# Patient Record
Sex: FEMALE | Race: WHITE | NOT HISPANIC OR LATINO
[De-identification: names, ages, dates, MRNs, and addresses within clinical notes are randomized per-mention and may not be internally consistent; named-entity substitution may affect disease eponyms.]

---

## 2017-01-13 ENCOUNTER — MEDICATION RENEWAL (OUTPATIENT)
Age: 67
End: 2017-01-13

## 2017-02-13 ENCOUNTER — MEDICATION RENEWAL (OUTPATIENT)
Age: 67
End: 2017-02-13

## 2017-04-27 ENCOUNTER — OTHER (OUTPATIENT)
Age: 67
End: 2017-04-27

## 2017-05-03 ENCOUNTER — APPOINTMENT (OUTPATIENT)
Dept: FAMILY MEDICINE | Facility: CLINIC | Age: 67
End: 2017-05-03

## 2017-05-03 VITALS
DIASTOLIC BLOOD PRESSURE: 60 MMHG | BODY MASS INDEX: 23.7 KG/M2 | HEIGHT: 67 IN | RESPIRATION RATE: 16 BRPM | OXYGEN SATURATION: 98 % | WEIGHT: 151 LBS | SYSTOLIC BLOOD PRESSURE: 130 MMHG | HEART RATE: 60 BPM

## 2017-05-03 DIAGNOSIS — G47.00 INSOMNIA, UNSPECIFIED: ICD-10-CM

## 2017-06-09 ENCOUNTER — MESSAGE (OUTPATIENT)
Age: 67
End: 2017-06-09

## 2017-06-14 ENCOUNTER — OTHER (OUTPATIENT)
Age: 67
End: 2017-06-14

## 2017-06-19 ENCOUNTER — MESSAGE (OUTPATIENT)
Age: 67
End: 2017-06-19

## 2017-06-23 ENCOUNTER — MESSAGE (OUTPATIENT)
Age: 67
End: 2017-06-23

## 2017-07-18 ENCOUNTER — MESSAGE (OUTPATIENT)
Age: 67
End: 2017-07-18

## 2017-07-25 ENCOUNTER — MESSAGE (OUTPATIENT)
Age: 67
End: 2017-07-25

## 2017-08-23 ENCOUNTER — MESSAGE (OUTPATIENT)
Age: 67
End: 2017-08-23

## 2017-09-22 ENCOUNTER — MEDICATION RENEWAL (OUTPATIENT)
Age: 67
End: 2017-09-22

## 2017-10-06 ENCOUNTER — MESSAGE (OUTPATIENT)
Age: 67
End: 2017-10-06

## 2017-10-19 ENCOUNTER — APPOINTMENT (OUTPATIENT)
Dept: FAMILY MEDICINE | Facility: CLINIC | Age: 67
End: 2017-10-19
Payer: MEDICARE

## 2017-10-19 VITALS
HEIGHT: 67 IN | TEMPERATURE: 97.9 F | HEART RATE: 55 BPM | BODY MASS INDEX: 26.06 KG/M2 | OXYGEN SATURATION: 96 % | DIASTOLIC BLOOD PRESSURE: 68 MMHG | WEIGHT: 166 LBS | SYSTOLIC BLOOD PRESSURE: 110 MMHG | RESPIRATION RATE: 16 BRPM

## 2017-10-19 PROCEDURE — 90686 IIV4 VACC NO PRSV 0.5 ML IM: CPT

## 2017-10-19 PROCEDURE — G0008: CPT

## 2017-10-19 PROCEDURE — 99214 OFFICE O/P EST MOD 30 MIN: CPT | Mod: 25

## 2017-10-23 ENCOUNTER — MEDICATION RENEWAL (OUTPATIENT)
Age: 67
End: 2017-10-23

## 2017-10-25 ENCOUNTER — MEDICATION RENEWAL (OUTPATIENT)
Age: 67
End: 2017-10-25

## 2017-10-30 ENCOUNTER — RX RENEWAL (OUTPATIENT)
Age: 67
End: 2017-10-30

## 2017-11-14 ENCOUNTER — MEDICATION RENEWAL (OUTPATIENT)
Age: 67
End: 2017-11-14

## 2017-11-15 ENCOUNTER — RX RENEWAL (OUTPATIENT)
Age: 67
End: 2017-11-15

## 2017-11-21 ENCOUNTER — MEDICATION RENEWAL (OUTPATIENT)
Age: 67
End: 2017-11-21

## 2017-12-19 ENCOUNTER — MEDICATION RENEWAL (OUTPATIENT)
Age: 67
End: 2017-12-19

## 2018-01-09 ENCOUNTER — MEDICATION RENEWAL (OUTPATIENT)
Age: 68
End: 2018-01-09

## 2018-01-22 ENCOUNTER — MEDICATION RENEWAL (OUTPATIENT)
Age: 68
End: 2018-01-22

## 2018-02-07 ENCOUNTER — MEDICATION RENEWAL (OUTPATIENT)
Age: 68
End: 2018-02-07

## 2018-02-08 ENCOUNTER — MEDICATION RENEWAL (OUTPATIENT)
Age: 68
End: 2018-02-08

## 2018-02-08 ENCOUNTER — LABORATORY RESULT (OUTPATIENT)
Age: 68
End: 2018-02-08

## 2018-02-20 ENCOUNTER — MEDICATION RENEWAL (OUTPATIENT)
Age: 68
End: 2018-02-20

## 2018-03-08 ENCOUNTER — LABORATORY RESULT (OUTPATIENT)
Age: 68
End: 2018-03-08

## 2018-03-21 ENCOUNTER — LABORATORY RESULT (OUTPATIENT)
Age: 68
End: 2018-03-21

## 2018-03-21 ENCOUNTER — APPOINTMENT (OUTPATIENT)
Dept: FAMILY MEDICINE | Facility: CLINIC | Age: 68
End: 2018-03-21
Payer: MEDICARE

## 2018-03-21 VITALS
WEIGHT: 166 LBS | OXYGEN SATURATION: 97 % | SYSTOLIC BLOOD PRESSURE: 100 MMHG | HEIGHT: 67 IN | BODY MASS INDEX: 26.06 KG/M2 | DIASTOLIC BLOOD PRESSURE: 70 MMHG | RESPIRATION RATE: 16 BRPM | HEART RATE: 125 BPM

## 2018-03-21 PROCEDURE — 99214 OFFICE O/P EST MOD 30 MIN: CPT | Mod: 25

## 2018-03-21 PROCEDURE — 36415 COLL VENOUS BLD VENIPUNCTURE: CPT

## 2018-03-21 RX ORDER — MULTIVIT-MIN/FOLIC/VIT K/LYCOP 400-300MCG
25 MCG TABLET ORAL
Refills: 0 | Status: ACTIVE | COMMUNITY

## 2018-03-21 RX ORDER — VITAMIN E ACETATE 670 MG
CAPSULE ORAL
Refills: 0 | Status: ACTIVE | COMMUNITY

## 2018-03-21 RX ORDER — BIOTIN 10 MG
10000 TABLET ORAL
Refills: 0 | Status: ACTIVE | COMMUNITY

## 2018-03-21 RX ORDER — CHLORHEXIDINE GLUCONATE 4 %
1000 LIQUID (ML) TOPICAL
Refills: 0 | Status: ACTIVE | COMMUNITY

## 2018-03-21 RX ORDER — MULTIVIT-MIN/FOLIC/VIT K/LYCOP 400-300MCG
1000 TABLET ORAL
Refills: 0 | Status: ACTIVE | COMMUNITY

## 2018-03-21 RX ORDER — PSYLLIUM HUSK 0.4 G
CAPSULE ORAL
Refills: 0 | Status: ACTIVE | COMMUNITY

## 2018-03-21 RX ORDER — CHOLECALCIFEROL (VITAMIN D3) 125 MCG
CAPSULE ORAL
Refills: 0 | Status: ACTIVE | COMMUNITY

## 2018-03-22 LAB
25(OH)D3 SERPL-MCNC: 29 NG/ML
ALBUMIN SERPL ELPH-MCNC: 4.3 G/DL
ALP BLD-CCNC: 57 U/L
ALT SERPL-CCNC: 25 U/L
ANION GAP SERPL CALC-SCNC: 18 MMOL/L
AST SERPL-CCNC: 24 U/L
B BURGDOR AB SER-IMP: NEGATIVE
B BURGDOR IGG+IGM SER QL IB: NORMAL
B BURGDOR IGG+IGM SER QL: 0.03 INDEX
BASOPHILS # BLD AUTO: 0.03 K/UL
BASOPHILS NFR BLD AUTO: 0.2 %
BILIRUB SERPL-MCNC: 0.2 MG/DL
BUN SERPL-MCNC: 29 MG/DL
CALCIUM SERPL-MCNC: 10.1 MG/DL
CHLORIDE SERPL-SCNC: 99 MMOL/L
CHOLEST SERPL-MCNC: 126 MG/DL
CHOLEST/HDLC SERPL: 4.7 RATIO
CO2 SERPL-SCNC: 22 MMOL/L
CREAT SERPL-MCNC: 0.96 MG/DL
EOSINOPHIL # BLD AUTO: 0.18 K/UL
EOSINOPHIL NFR BLD AUTO: 1.5 %
ERYTHROCYTE [SEDIMENTATION RATE] IN BLOOD BY WESTERGREN METHOD: 39 MM/HR
FERRITIN SERPL-MCNC: 59 NG/ML
FOLATE SERPL-MCNC: >20 NG/ML
GLUCOSE SERPL-MCNC: 104 MG/DL
HBA1C MFR BLD HPLC: 6.8 %
HCT VFR BLD CALC: 40.1 %
HDLC SERPL-MCNC: 27 MG/DL
HGB BLD-MCNC: 13 G/DL
IMM GRANULOCYTES NFR BLD AUTO: 0.2 %
LDLC SERPL CALC-MCNC: 33 MG/DL
LYMPHOCYTES # BLD AUTO: 2.1 K/UL
LYMPHOCYTES NFR BLD AUTO: 17.3 %
MAN DIFF?: NORMAL
MCHC RBC-ENTMCNC: 29.7 PG
MCHC RBC-ENTMCNC: 32.4 GM/DL
MCV RBC AUTO: 91.6 FL
MONOCYTES # BLD AUTO: 0.74 K/UL
MONOCYTES NFR BLD AUTO: 6.1 %
NEUTROPHILS # BLD AUTO: 9.06 K/UL
NEUTROPHILS NFR BLD AUTO: 74.7 %
PLATELET # BLD AUTO: 381 K/UL
POTASSIUM SERPL-SCNC: 4.7 MMOL/L
PROT SERPL-MCNC: 7.5 G/DL
RBC # BLD: 4.38 M/UL
RBC # FLD: 14.7 %
RHEUMATOID FACT SER QL: <7 IU/ML
SODIUM SERPL-SCNC: 139 MMOL/L
T3RU NFR SERPL: 1 INDEX
T4 FREE SERPL-MCNC: 1.2 NG/DL
TRIGL SERPL-MCNC: 332 MG/DL
TSH SERPL-ACNC: 1.59 UIU/ML
VIT B12 SERPL-MCNC: 1034 PG/ML
WBC # FLD AUTO: 12.14 K/UL

## 2018-03-28 LAB
ANA PAT FLD IF-IMP: ABNORMAL
ANA SER IF-ACNC: ABNORMAL
ANAPLASMA PHAGOCYTO IGM COMENT: NORMAL
ANAPLASMA PHAGOCYTO IGM COMMENT: NORMAL
ANAPLASMA PHAGOCYTOPHILIA IGG ANTIBODIES: NORMAL
ANAPLASMA PHAGOCYTOPHILIA IGM ANTIBODIES: NORMAL
B MICROTI AB SER QL: NORMAL
BABESIA ANTIBODIES, IGG: NORMAL
BABESIA ANTIBODIES, IGM: NORMAL
EHRLICIA CHAFFEENIS IGG ANTIBODIES: NORMAL
EHRLICIA CHAFFEENIS IGG COMMENT: NORMAL
EHRLICIA CHAFFEENIS IGG INTERP: NORMAL
EHRLICIA CHAFFEENIS IGM ANTIBODIES: NORMAL
R RICKETTSI IGG CSF-ACNC: NEGATIVE
R RICKETTSI IGM CSF-ACNC: 1.06 INDEX

## 2018-04-05 ENCOUNTER — LABORATORY RESULT (OUTPATIENT)
Age: 68
End: 2018-04-05

## 2018-04-06 ENCOUNTER — MEDICATION RENEWAL (OUTPATIENT)
Age: 68
End: 2018-04-06

## 2018-04-17 ENCOUNTER — LABORATORY RESULT (OUTPATIENT)
Age: 68
End: 2018-04-17

## 2018-04-23 ENCOUNTER — MEDICATION RENEWAL (OUTPATIENT)
Age: 68
End: 2018-04-23

## 2018-04-30 ENCOUNTER — LABORATORY RESULT (OUTPATIENT)
Age: 68
End: 2018-04-30

## 2018-05-01 ENCOUNTER — LABORATORY RESULT (OUTPATIENT)
Age: 68
End: 2018-05-01

## 2018-05-01 ENCOUNTER — APPOINTMENT (OUTPATIENT)
Dept: FAMILY MEDICINE | Facility: CLINIC | Age: 68
End: 2018-05-01
Payer: MEDICARE

## 2018-05-01 VITALS
TEMPERATURE: 98.4 F | RESPIRATION RATE: 15 BRPM | OXYGEN SATURATION: 97 % | SYSTOLIC BLOOD PRESSURE: 122 MMHG | DIASTOLIC BLOOD PRESSURE: 78 MMHG | HEART RATE: 59 BPM

## 2018-05-01 PROCEDURE — 36415 COLL VENOUS BLD VENIPUNCTURE: CPT

## 2018-05-01 PROCEDURE — 99214 OFFICE O/P EST MOD 30 MIN: CPT | Mod: 25

## 2018-05-04 ENCOUNTER — OTHER (OUTPATIENT)
Age: 68
End: 2018-05-04

## 2018-05-04 DIAGNOSIS — E87.5 HYPERKALEMIA: ICD-10-CM

## 2018-05-08 LAB
25(OH)D3 SERPL-MCNC: 31.8 NG/ML
ALBUMIN SERPL ELPH-MCNC: 4.4 G/DL
ALP BLD-CCNC: 60 U/L
ALT SERPL-CCNC: 24 U/L
ANA PAT FLD IF-IMP: ABNORMAL
ANA SER IF-ACNC: ABNORMAL
ANAPLASMA PHAGOCYTO IGM COMENT: NORMAL
ANAPLASMA PHAGOCYTO IGM COMMENT: NORMAL
ANAPLASMA PHAGOCYTOPHILIA IGG ANTIBODIES: NORMAL
ANAPLASMA PHAGOCYTOPHILIA IGM ANTIBODIES: NORMAL
ANION GAP SERPL CALC-SCNC: 13 MMOL/L
AST SERPL-CCNC: 20 U/L
B BURGDOR AB SER-IMP: NEGATIVE
B BURGDOR IGG+IGM SER QL IB: NORMAL
B BURGDOR IGG+IGM SER QL: 0.03 INDEX
B MICROTI AB SER QL: NORMAL
BABESIA ANTIBODIES, IGG: NORMAL
BABESIA ANTIBODIES, IGM: NORMAL
BASOPHILS # BLD AUTO: 0.02 K/UL
BASOPHILS NFR BLD AUTO: 0.2 %
BILIRUB SERPL-MCNC: 0.2 MG/DL
BUN SERPL-MCNC: 20 MG/DL
CALCIUM SERPL-MCNC: 10.5 MG/DL
CHLORIDE SERPL-SCNC: 99 MMOL/L
CHOLEST SERPL-MCNC: 125 MG/DL
CHOLEST/HDLC SERPL: 3.7 RATIO
CO2 SERPL-SCNC: 28 MMOL/L
CREAT SERPL-MCNC: 0.92 MG/DL
EHRLICIA CHAFFEENIS IGG ANTIBODIES: NORMAL
EHRLICIA CHAFFEENIS IGG COMMENT: NORMAL
EHRLICIA CHAFFEENIS IGG INTERP: NORMAL
EHRLICIA CHAFFEENIS IGM ANTIBODIES: NORMAL
EOSINOPHIL # BLD AUTO: 0.21 K/UL
EOSINOPHIL NFR BLD AUTO: 1.6 %
FERRITIN SERPL-MCNC: 47 NG/ML
FOLATE SERPL-MCNC: 13.2 NG/ML
GLUCOSE SERPL-MCNC: 106 MG/DL
HBA1C MFR BLD HPLC: 6.6 %
HCT VFR BLD CALC: 42.1 %
HDLC SERPL-MCNC: 34 MG/DL
HGB BLD-MCNC: 12.9 G/DL
IMM GRANULOCYTES NFR BLD AUTO: 0.2 %
LDLC SERPL CALC-MCNC: 35 MG/DL
LYMPHOCYTES # BLD AUTO: 2.19 K/UL
LYMPHOCYTES NFR BLD AUTO: 17.1 %
MAN DIFF?: NORMAL
MCHC RBC-ENTMCNC: 28.9 PG
MCHC RBC-ENTMCNC: 30.6 GM/DL
MCV RBC AUTO: 94.2 FL
MONOCYTES # BLD AUTO: 0.82 K/UL
MONOCYTES NFR BLD AUTO: 6.4 %
NEUTROPHILS # BLD AUTO: 9.52 K/UL
NEUTROPHILS NFR BLD AUTO: 74.5 %
PLATELET # BLD AUTO: 441 K/UL
POTASSIUM SERPL-SCNC: 5.7 MMOL/L
PROT SERPL-MCNC: 7.6 G/DL
R RICKETTSI IGG CSF-ACNC: NEGATIVE
R RICKETTSI IGM CSF-ACNC: 0.56 INDEX
RBC # BLD: 4.47 M/UL
RBC # FLD: 14.7 %
RHEUMATOID FACT SER QL: <7 IU/ML
SODIUM SERPL-SCNC: 140 MMOL/L
T3RU NFR SERPL: 1.11 INDEX
T4 FREE SERPL-MCNC: 1.3 NG/DL
TRIGL SERPL-MCNC: 278 MG/DL
TSH SERPL-ACNC: 1.87 UIU/ML
VIT B12 SERPL-MCNC: 1034 PG/ML
WBC # FLD AUTO: 12.79 K/UL

## 2018-05-11 LAB — POTASSIUM SERPL-SCNC: 4.8 MMOL/L

## 2018-05-15 ENCOUNTER — LABORATORY RESULT (OUTPATIENT)
Age: 68
End: 2018-05-15

## 2018-05-22 ENCOUNTER — MEDICATION RENEWAL (OUTPATIENT)
Age: 68
End: 2018-05-22

## 2018-06-13 ENCOUNTER — LABORATORY RESULT (OUTPATIENT)
Age: 68
End: 2018-06-13

## 2018-06-29 ENCOUNTER — APPOINTMENT (OUTPATIENT)
Dept: FAMILY MEDICINE | Facility: CLINIC | Age: 68
End: 2018-06-29
Payer: MEDICARE

## 2018-06-29 VITALS
DIASTOLIC BLOOD PRESSURE: 76 MMHG | RESPIRATION RATE: 22 BRPM | SYSTOLIC BLOOD PRESSURE: 124 MMHG | HEART RATE: 88 BPM | TEMPERATURE: 98.3 F | OXYGEN SATURATION: 98 %

## 2018-06-29 DIAGNOSIS — M54.2 CERVICALGIA: ICD-10-CM

## 2018-06-29 PROCEDURE — 99214 OFFICE O/P EST MOD 30 MIN: CPT

## 2018-07-05 VITALS — WEIGHT: 166 LBS | HEIGHT: 67 IN | BODY MASS INDEX: 26.06 KG/M2

## 2018-07-05 PROBLEM — M54.2 CERVICAL PAIN (NECK): Status: ACTIVE | Noted: 2018-06-29

## 2018-07-12 ENCOUNTER — LABORATORY RESULT (OUTPATIENT)
Age: 68
End: 2018-07-12

## 2018-07-20 ENCOUNTER — MEDICATION RENEWAL (OUTPATIENT)
Age: 68
End: 2018-07-20

## 2018-07-20 ENCOUNTER — APPOINTMENT (OUTPATIENT)
Dept: FAMILY MEDICINE | Facility: CLINIC | Age: 68
End: 2018-07-20
Payer: MEDICARE

## 2018-07-20 DIAGNOSIS — L03.311 CELLULITIS OF ABDOMINAL WALL: ICD-10-CM

## 2018-07-20 PROCEDURE — 99214 OFFICE O/P EST MOD 30 MIN: CPT

## 2018-07-23 VITALS
HEART RATE: 86 BPM | WEIGHT: 166 LBS | TEMPERATURE: 98.4 F | DIASTOLIC BLOOD PRESSURE: 78 MMHG | BODY MASS INDEX: 26.06 KG/M2 | RESPIRATION RATE: 18 BRPM | HEIGHT: 67 IN | SYSTOLIC BLOOD PRESSURE: 120 MMHG | OXYGEN SATURATION: 98 %

## 2018-07-23 PROBLEM — L03.311 CELLULITIS OF ABDOMINAL WALL: Status: ACTIVE | Noted: 2018-07-20

## 2018-07-31 ENCOUNTER — APPOINTMENT (OUTPATIENT)
Dept: FAMILY MEDICINE | Facility: CLINIC | Age: 68
End: 2018-07-31
Payer: MEDICARE

## 2018-07-31 VITALS
TEMPERATURE: 98.2 F | DIASTOLIC BLOOD PRESSURE: 78 MMHG | SYSTOLIC BLOOD PRESSURE: 120 MMHG | RESPIRATION RATE: 17 BRPM | OXYGEN SATURATION: 98 % | HEART RATE: 73 BPM

## 2018-07-31 DIAGNOSIS — E04.1 NONTOXIC SINGLE THYROID NODULE: ICD-10-CM

## 2018-07-31 DIAGNOSIS — S31.109S UNSPECIFIED OPEN WOUND OF ABDOMINAL WALL, UNSPECIFIED QUADRANT W/OUT PENETRATION INTO PERITONEAL CAVITY, SEQUELA: ICD-10-CM

## 2018-07-31 PROCEDURE — 99214 OFFICE O/P EST MOD 30 MIN: CPT

## 2018-08-08 ENCOUNTER — LABORATORY RESULT (OUTPATIENT)
Age: 68
End: 2018-08-08

## 2018-08-13 VITALS — WEIGHT: 166 LBS | HEIGHT: 67 IN | BODY MASS INDEX: 26.06 KG/M2

## 2018-08-13 PROBLEM — S31.109S: Status: ACTIVE | Noted: 2018-08-13

## 2018-08-20 ENCOUNTER — MESSAGE (OUTPATIENT)
Age: 68
End: 2018-08-20

## 2018-08-24 ENCOUNTER — APPOINTMENT (OUTPATIENT)
Dept: RHEUMATOLOGY | Facility: CLINIC | Age: 68
End: 2018-08-24

## 2018-09-06 ENCOUNTER — LABORATORY RESULT (OUTPATIENT)
Age: 68
End: 2018-09-06

## 2018-10-08 ENCOUNTER — RECORD ABSTRACTING (OUTPATIENT)
Age: 68
End: 2018-10-08

## 2018-10-08 ENCOUNTER — LABORATORY RESULT (OUTPATIENT)
Age: 68
End: 2018-10-08

## 2018-11-06 ENCOUNTER — LABORATORY RESULT (OUTPATIENT)
Age: 68
End: 2018-11-06

## 2018-11-15 ENCOUNTER — APPOINTMENT (OUTPATIENT)
Dept: FAMILY MEDICINE | Facility: CLINIC | Age: 68
End: 2018-11-15
Payer: MEDICARE

## 2018-11-15 DIAGNOSIS — Z23 ENCOUNTER FOR IMMUNIZATION: ICD-10-CM

## 2018-11-15 PROCEDURE — G0009: CPT

## 2018-11-15 PROCEDURE — 90732 PPSV23 VACC 2 YRS+ SUBQ/IM: CPT

## 2018-12-05 ENCOUNTER — LABORATORY RESULT (OUTPATIENT)
Age: 68
End: 2018-12-05

## 2018-12-13 ENCOUNTER — RX RENEWAL (OUTPATIENT)
Age: 68
End: 2018-12-13

## 2018-12-21 ENCOUNTER — RX RENEWAL (OUTPATIENT)
Age: 68
End: 2018-12-21

## 2019-01-07 ENCOUNTER — LABORATORY RESULT (OUTPATIENT)
Age: 69
End: 2019-01-07

## 2019-01-24 DIAGNOSIS — M79.651 PAIN IN RIGHT THIGH: ICD-10-CM

## 2019-01-28 PROBLEM — M79.651 PAIN OF RIGHT LATERAL UPPER THIGH: Status: ACTIVE | Noted: 2019-01-28

## 2019-02-07 ENCOUNTER — LABORATORY RESULT (OUTPATIENT)
Age: 69
End: 2019-02-07

## 2019-02-14 ENCOUNTER — LABORATORY RESULT (OUTPATIENT)
Age: 69
End: 2019-02-14

## 2019-02-28 ENCOUNTER — LABORATORY RESULT (OUTPATIENT)
Age: 69
End: 2019-02-28

## 2019-03-14 ENCOUNTER — LABORATORY RESULT (OUTPATIENT)
Age: 69
End: 2019-03-14

## 2019-03-18 ENCOUNTER — LABORATORY RESULT (OUTPATIENT)
Age: 69
End: 2019-03-18

## 2019-03-18 LAB
BASOPHILS # BLD AUTO: 0.06 K/UL
BASOPHILS NFR BLD AUTO: 0.4 %
EOSINOPHIL # BLD AUTO: 0.12 K/UL
EOSINOPHIL NFR BLD AUTO: 0.8 %
HCT VFR BLD CALC: 40.3 %
HGB BLD-MCNC: 11.2 G/DL
IMM GRANULOCYTES NFR BLD AUTO: 0.4 %
LYMPHOCYTES # BLD AUTO: 2.55 K/UL
LYMPHOCYTES NFR BLD AUTO: 18 %
MAN DIFF?: NORMAL
MCHC RBC-ENTMCNC: 23.9 PG
MCHC RBC-ENTMCNC: 27.8 GM/DL
MCV RBC AUTO: 86.1 FL
MONOCYTES # BLD AUTO: 0.91 K/UL
MONOCYTES NFR BLD AUTO: 6.4 %
NEUTROPHILS # BLD AUTO: 10.48 K/UL
NEUTROPHILS NFR BLD AUTO: 74 %
PLATELET # BLD AUTO: 558 K/UL
RBC # BLD: 4.68 M/UL
RBC # FLD: 15.9 %
WBC # FLD AUTO: 14.18 K/UL

## 2019-03-20 ENCOUNTER — APPOINTMENT (OUTPATIENT)
Dept: FAMILY MEDICINE | Facility: CLINIC | Age: 69
End: 2019-03-20
Payer: MEDICARE

## 2019-03-20 ENCOUNTER — NON-APPOINTMENT (OUTPATIENT)
Age: 69
End: 2019-03-20

## 2019-03-20 VITALS
HEIGHT: 67 IN | DIASTOLIC BLOOD PRESSURE: 75 MMHG | WEIGHT: 174 LBS | OXYGEN SATURATION: 98 % | HEART RATE: 93 BPM | SYSTOLIC BLOOD PRESSURE: 125 MMHG | BODY MASS INDEX: 27.31 KG/M2 | RESPIRATION RATE: 14 BRPM

## 2019-03-20 VITALS — TEMPERATURE: 98.6 F

## 2019-03-20 DIAGNOSIS — N39.0 URINARY TRACT INFECTION, SITE NOT SPECIFIED: ICD-10-CM

## 2019-03-20 DIAGNOSIS — H90.2 CONDUCTIVE HEARING LOSS, UNSPECIFIED: ICD-10-CM

## 2019-03-20 PROCEDURE — G0444 DEPRESSION SCREEN ANNUAL: CPT | Mod: 59

## 2019-03-20 PROCEDURE — 94010 BREATHING CAPACITY TEST: CPT

## 2019-03-20 PROCEDURE — 36415 COLL VENOUS BLD VENIPUNCTURE: CPT

## 2019-03-20 PROCEDURE — G0439: CPT

## 2019-03-20 PROCEDURE — 93000 ELECTROCARDIOGRAM COMPLETE: CPT

## 2019-03-20 RX ORDER — OMEPRAZOLE 40 MG/1
CAPSULE, DELAYED RELEASE ORAL
Refills: 0 | Status: DISCONTINUED | COMMUNITY
End: 2019-03-20

## 2019-03-20 RX ORDER — VALSARTAN AND HYDROCHLOROTHIAZIDE 160; 25 MG/1; MG/1
160-25 TABLET, FILM COATED ORAL
Qty: 90 | Refills: 0 | Status: DISCONTINUED | COMMUNITY
Start: 2017-09-26 | End: 2019-03-20

## 2019-03-20 RX ORDER — DILTIAZEM HYDROCHLORIDE 180 MG/1
180 CAPSULE, EXTENDED RELEASE ORAL
Qty: 90 | Refills: 0 | Status: DISCONTINUED | COMMUNITY
Start: 2017-10-02 | End: 2019-03-20

## 2019-03-20 RX ORDER — FOLIC ACID 1 MG/1
1 TABLET ORAL
Refills: 0 | Status: DISCONTINUED | COMMUNITY
End: 2019-03-20

## 2019-03-20 RX ORDER — DIPHENOXYLATE HYDROCHLORIDE AND ATROPINE SULFATE 2.5; .025 MG/1; MG/1
2.5-0.025 TABLET ORAL
Qty: 30 | Refills: 0 | Status: DISCONTINUED | COMMUNITY
Start: 2019-01-31 | End: 2019-03-20

## 2019-03-20 RX ORDER — CEPHALEXIN 500 MG/1
500 TABLET ORAL
Qty: 20 | Refills: 0 | Status: DISCONTINUED | COMMUNITY
Start: 2018-07-20 | End: 2019-03-20

## 2019-03-20 RX ORDER — ROSUVASTATIN CALCIUM 20 MG/1
20 TABLET, FILM COATED ORAL DAILY
Qty: 90 | Refills: 3 | Status: DISCONTINUED | COMMUNITY
Start: 2018-02-08 | End: 2019-03-20

## 2019-03-20 NOTE — PHYSICAL EXAM
[No Acute Distress] : no acute distress [Well Nourished] : well nourished [Well Developed] : well developed [Normal Sclera/Conjunctiva] : normal sclera/conjunctiva [PERRL] : pupils equal round and reactive to light [EOMI] : extraocular movements intact [Normal Outer Ear/Nose] : the outer ears and nose were normal in appearance [Normal Oropharynx] : the oropharynx was normal [No Respiratory Distress] : no respiratory distress  [Clear to Auscultation] : lungs were clear to auscultation bilaterally [No Accessory Muscle Use] : no accessory muscle use [Normal Rate] : normal rate  [No Murmur] : no murmur heard [No Edema] : there was no peripheral edema [Soft] : abdomen soft [Non Tender] : non-tender [Normal Bowel Sounds] : normal bowel sounds [Normal Posterior Cervical Nodes] : no posterior cervical lymphadenopathy [Normal Anterior Cervical Nodes] : no anterior cervical lymphadenopathy [Grossly Normal Strength/Tone] : grossly normal strength/tone [No Rash] : no rash [Normal Gait] : normal gait [Coordination Grossly Intact] : coordination grossly intact [No Focal Deficits] : no focal deficits [Normal Affect] : the affect was normal [Normal Insight/Judgement] : insight and judgment were intact [de-identified] : irregular controlled rate  [de-identified] : low back tenderness  [de-identified] : hand joint swollen bilaterally

## 2019-03-20 NOTE — PLAN
[FreeTextEntry1] : Will send urine for culture \par Will start antibiotics \par Pyridium as needed \par Increase fluid intake \par Clean perineum wiping front to back \par Empty bladder before and after intercourse \par Follow up if symptoms don’t  improve or worsening, fever, chills, abdominal pain, hematuria, nausea vomiting or back pain.\par \par Spirometry done, results reviewed and discussed with patient. \par EKG done, results reviewed and discussed with patient.\par Hearing test done, results reviewed and discussed with patient. refer to ENT \par We spent sufficient time to discuss aspects of care; questions were answered  to patient's satisfaction.The diagnosis and care plan were discussed with patient in detail.  Patient test results were  reviewed and explained in full. All questions were answered.\par

## 2019-03-20 NOTE — REVIEW OF SYSTEMS
[Fatigue] : fatigue [Joint Pain] : joint pain [Joint Stiffness] : joint stiffness [Muscle Weakness] : muscle weakness [Joint Swelling] : joint swelling [Muscle Pain] : muscle pain [Back Pain] : back pain [Negative] : Heme/Lymph

## 2019-03-20 NOTE — HISTORY OF PRESENT ILLNESS
[FreeTextEntry1] : DAMIEN PATEL is a 68 year old female who presents for annual physical \par  [de-identified] : DAMIEN PATEL is a 68 year old female who presents with stabbing pain on her back and her legs, having difficulty walking. she has an MRI and did not show anything. She feels severe body aches for 6 weeks. We discussed her blood results,in 2018 PADDY was high, she has not follow with a RA specialist due to her abdominal issues and multiple surgeries. We discussed pain management and I refereed her to a pian management doctor, She is agreeable, I will re new Percocet for 7 days . \par We reviewed her blood and urine results, as per the urinalysis she has a UTI, i will send her urine for culture and start treatment, I will also repeat her CBC, wbc was elevated. \par she continues to take coumadin,, she has not had a run of svt's

## 2019-03-20 NOTE — HEALTH RISK ASSESSMENT
[Fair] :  ~his/her~ mood as fair [No falls in past year] : Patient reported no falls in the past year [Patient reported mammogram was normal] : Patient reported mammogram was normal [Patient reported PAP Smear was normal] : Patient reported PAP Smear was normal [Patient reported bone density results were normal] : Patient reported bone density results were normal [Patient reported colonoscopy was normal] : Patient reported colonoscopy was normal [HIV test declined] : HIV test declined [Hepatitis C test declined] : Hepatitis C test declined [None] : None [Alone] : lives alone [# of Members in Household ___] :  household currently consist of [unfilled] member(s) [Retired] : retired [High School] : high school [Single] : single [Feels Safe at Home] : Feels safe at home [Fully functional (bathing, dressing, toileting, transferring, walking, feeding)] : Fully functional (bathing, dressing, toileting, transferring, walking, feeding) [Fully functional (using the telephone, shopping, preparing meals, housekeeping, doing laundry, using] : Fully functional and needs no help or supervision to perform IADLs (using the telephone, shopping, preparing meals, housekeeping, doing laundry, using transportation, managing medications and managing finances) [Reports changes in hearing] : Reports changes in hearing [Reports normal functional visual acuity (ie: able to read med bottle)] : Reports normal functional visual acuity [Smoke Detector] : smoke detector [Carbon Monoxide Detector] : carbon monoxide detector [Safety elements used in home] : safety elements used in home [Seat Belt] :  uses seat belt [Sunscreen] : uses sunscreen [Reviewed updated] : Reviewed updated [Designated Healthcare Proxy] : Designated healthcare proxy [Name: ___] : Health Care Proxy's Name: [unfilled]  [Relationship: ___] : Relationship: [unfilled] [Aggressive treatment] : aggressive treatment [FreeTextEntry1] : body aches  [] : No [de-identified] : none  [de-identified] : GI [de-identified] : poor  [de-identified] : none  [LowDoseCTScan] : 01/2018 [EyeExamDate] : 2018 [Change in mental status noted] : No change in mental status noted [Language] : denies difficulty with language [Behavior] : denies difficulty with behavior [Learning/Retaining New Information] : denies difficulty learning/retaining new information [Handling Complex Tasks] : denies difficulty handling complex tasks [Reasoning] : denies difficulty with reasoning [Spatial Ability and Orientation] : denies difficulty with spatial ability and orientation [Sexually Active] : not sexually active [Reports changes in vision] : Reports no changes in vision [Reports changes in dental health] : Reports no changes in dental health [Guns at Home] : no guns at home [Travel to Developing Areas] : does not  travel to developing areas [TB Exposure] : is not being exposed to tuberculosis [Caregiver Concerns] : does not have caregiver concerns [MammogramDate] : 2016 [MammogramComments] : has bertrand with GYN  [PapSmearDate] : 2016 [BoneDensityDate] : 2016 [ColonoscopyDate] : 2012 [AdvancecareDate] : 03/20/2019

## 2019-03-21 LAB
BASOPHILS # BLD AUTO: 0.06 K/UL
BASOPHILS NFR BLD AUTO: 0.4 %
EOSINOPHIL # BLD AUTO: 0.16 K/UL
EOSINOPHIL NFR BLD AUTO: 1.2 %
HCT VFR BLD CALC: 39.7 %
HGB BLD-MCNC: 11.1 G/DL
IMM GRANULOCYTES NFR BLD AUTO: 0.3 %
LYMPHOCYTES # BLD AUTO: 2 K/UL
LYMPHOCYTES NFR BLD AUTO: 15 %
MAN DIFF?: NORMAL
MCHC RBC-ENTMCNC: 24.7 PG
MCHC RBC-ENTMCNC: 28 GM/DL
MCV RBC AUTO: 88.2 FL
MONOCYTES # BLD AUTO: 0.7 K/UL
MONOCYTES NFR BLD AUTO: 5.2 %
NEUTROPHILS # BLD AUTO: 10.38 K/UL
NEUTROPHILS NFR BLD AUTO: 77.9 %
PLATELET # BLD AUTO: 562 K/UL
RBC # BLD: 4.5 M/UL
RBC # FLD: 16 %
WBC # FLD AUTO: 13.34 K/UL

## 2019-03-22 LAB — BACTERIA UR CULT: NORMAL

## 2019-03-26 LAB
25(OH)D3 SERPL-MCNC: 18.5 NG/ML
ALBUMIN SERPL ELPH-MCNC: 3.9 G/DL
ALP BLD-CCNC: 59 U/L
ALT SERPL-CCNC: 12 U/L
ANION GAP SERPL CALC-SCNC: 16 MMOL/L
APPEARANCE: ABNORMAL
AST SERPL-CCNC: 18 U/L
BILIRUB SERPL-MCNC: 0.3 MG/DL
BILIRUBIN URINE: NEGATIVE
BLOOD URINE: NEGATIVE
BUN SERPL-MCNC: 15 MG/DL
CALCIUM SERPL-MCNC: 9.5 MG/DL
CHLORIDE SERPL-SCNC: 101 MMOL/L
CHOLEST SERPL-MCNC: 111 MG/DL
CHOLEST/HDLC SERPL: 3.5 RATIO
CO2 SERPL-SCNC: 22 MMOL/L
COLOR: YELLOW
CREAT SERPL-MCNC: 0.67 MG/DL
CREAT SPEC-SCNC: 245 MG/DL
FERRITIN SERPL-MCNC: 23 NG/ML
FOLATE SERPL-MCNC: 13 NG/ML
GLUCOSE QUALITATIVE U: NEGATIVE
GLUCOSE SERPL-MCNC: 130 MG/DL
HBA1C MFR BLD HPLC: 6.4 %
HDLC SERPL-MCNC: 32 MG/DL
KETONES URINE: NORMAL
LDLC SERPL CALC-MCNC: 28 MG/DL
LEUKOCYTE ESTERASE URINE: ABNORMAL
MICROALBUMIN 24H UR DL<=1MG/L-MCNC: 13.1 MG/DL
MICROALBUMIN/CREAT 24H UR-RTO: 53 MG/G
NITRITE URINE: POSITIVE
PH URINE: 6
POTASSIUM SERPL-SCNC: 4.4 MMOL/L
PROT SERPL-MCNC: 7.3 G/DL
PROTEIN URINE: ABNORMAL
SODIUM SERPL-SCNC: 139 MMOL/L
SPECIFIC GRAVITY URINE: 1.02
T3RU NFR SERPL: 1 TBI
T4 FREE SERPL-MCNC: 1.3 NG/DL
TRIGL SERPL-MCNC: 257 MG/DL
TSH SERPL-ACNC: 1.07 UIU/ML
UROBILINOGEN URINE: NORMAL
VIT B12 SERPL-MCNC: 500 PG/ML

## 2019-04-09 ENCOUNTER — APPOINTMENT (OUTPATIENT)
Dept: FAMILY MEDICINE | Facility: CLINIC | Age: 69
End: 2019-04-09
Payer: MEDICARE

## 2019-04-09 VITALS
OXYGEN SATURATION: 95 % | HEART RATE: 86 BPM | SYSTOLIC BLOOD PRESSURE: 124 MMHG | DIASTOLIC BLOOD PRESSURE: 77 MMHG | RESPIRATION RATE: 14 BRPM | BODY MASS INDEX: 27.31 KG/M2 | HEIGHT: 67 IN | WEIGHT: 174 LBS

## 2019-04-09 PROCEDURE — 99215 OFFICE O/P EST HI 40 MIN: CPT

## 2019-04-09 RX ORDER — NITROFURANTOIN MACROCRYSTALS 100 MG/1
100 CAPSULE ORAL
Qty: 14 | Refills: 0 | Status: DISCONTINUED | COMMUNITY
Start: 2019-03-20 | End: 2019-04-09

## 2019-04-09 RX ORDER — NITROFURANTOIN (MONOHYDRATE/MACROCRYSTALS) 25; 75 MG/1; MG/1
100 CAPSULE ORAL
Qty: 14 | Refills: 0 | Status: DISCONTINUED | COMMUNITY
Start: 2019-03-26 | End: 2019-04-09

## 2019-04-10 LAB
ALBUMIN SERPL ELPH-MCNC: 4.5 G/DL
ALP BLD-CCNC: 60 U/L
ALT SERPL-CCNC: 20 U/L
ANION GAP SERPL CALC-SCNC: 14 MMOL/L
APPEARANCE: CLEAR
AST SERPL-CCNC: 15 U/L
BACTERIA: NEGATIVE
BASOPHILS # BLD AUTO: 0.04 K/UL
BASOPHILS NFR BLD AUTO: 0.3 %
BILIRUB SERPL-MCNC: <0.2 MG/DL
BILIRUBIN URINE: NEGATIVE
BLOOD URINE: NEGATIVE
BUN SERPL-MCNC: 17 MG/DL
CALCIUM SERPL-MCNC: 9.9 MG/DL
CHLORIDE SERPL-SCNC: 105 MMOL/L
CO2 SERPL-SCNC: 23 MMOL/L
COLOR: YELLOW
CREAT SERPL-MCNC: 0.78 MG/DL
EOSINOPHIL # BLD AUTO: 0.11 K/UL
EOSINOPHIL NFR BLD AUTO: 0.9 %
GLUCOSE QUALITATIVE U: NEGATIVE
GLUCOSE SERPL-MCNC: 84 MG/DL
HCT VFR BLD CALC: 39.8 %
HGB BLD-MCNC: 11.4 G/DL
HYALINE CASTS: 1 /LPF
IMM GRANULOCYTES NFR BLD AUTO: 0.4 %
INR PPP: 2.37 RATIO
KETONES URINE: NEGATIVE
LEUKOCYTE ESTERASE URINE: NEGATIVE
LYMPHOCYTES # BLD AUTO: 2.03 K/UL
LYMPHOCYTES NFR BLD AUTO: 16.2 %
MAN DIFF?: NORMAL
MCHC RBC-ENTMCNC: 24.9 PG
MCHC RBC-ENTMCNC: 28.6 GM/DL
MCV RBC AUTO: 86.9 FL
MICROSCOPIC-UA: NORMAL
MONOCYTES # BLD AUTO: 0.68 K/UL
MONOCYTES NFR BLD AUTO: 5.4 %
NEUTROPHILS # BLD AUTO: 9.63 K/UL
NEUTROPHILS NFR BLD AUTO: 76.8 %
NITRITE URINE: NEGATIVE
PH URINE: 5.5
PLATELET # BLD AUTO: 497 K/UL
POTASSIUM SERPL-SCNC: 4.9 MMOL/L
PROT SERPL-MCNC: 7.7 G/DL
PROTEIN URINE: NORMAL
PT BLD: 27.5 SEC
RBC # BLD: 4.58 M/UL
RBC # FLD: 16.4 %
RED BLOOD CELLS URINE: 1 /HPF
SODIUM SERPL-SCNC: 142 MMOL/L
SPECIFIC GRAVITY URINE: 1.02
SQUAMOUS EPITHELIAL CELLS: 3 /HPF
UROBILINOGEN URINE: NORMAL
WBC # FLD AUTO: 12.54 K/UL
WHITE BLOOD CELLS URINE: 2 /HPF

## 2019-04-12 LAB — BACTERIA UR CULT: NORMAL

## 2019-04-30 LAB
ALBUMIN SERPL ELPH-MCNC: 4.2 G/DL
ALP BLD-CCNC: 61 U/L
ALT SERPL-CCNC: 19 U/L
ANION GAP SERPL CALC-SCNC: 16 MMOL/L
AST SERPL-CCNC: 19 U/L
BASOPHILS # BLD AUTO: 0.04 K/UL
BASOPHILS NFR BLD AUTO: 0.3 %
BILIRUB SERPL-MCNC: 0.2 MG/DL
BUN SERPL-MCNC: 15 MG/DL
CALCIUM SERPL-MCNC: 9.6 MG/DL
CHLORIDE SERPL-SCNC: 103 MMOL/L
CO2 SERPL-SCNC: 21 MMOL/L
CREAT SERPL-MCNC: 0.7 MG/DL
EOSINOPHIL # BLD AUTO: 0.17 K/UL
EOSINOPHIL NFR BLD AUTO: 1.3 %
GLUCOSE SERPL-MCNC: 91 MG/DL
HCT VFR BLD CALC: 37.6 %
HGB BLD-MCNC: 10.7 G/DL
IMM GRANULOCYTES NFR BLD AUTO: 0.5 %
INR PPP: 2.66 RATIO
LYMPHOCYTES # BLD AUTO: 1.92 K/UL
LYMPHOCYTES NFR BLD AUTO: 15.2 %
MAN DIFF?: NORMAL
MCHC RBC-ENTMCNC: 24.5 PG
MCHC RBC-ENTMCNC: 28.5 GM/DL
MCV RBC AUTO: 86 FL
MONOCYTES # BLD AUTO: 0.86 K/UL
MONOCYTES NFR BLD AUTO: 6.8 %
NEUTROPHILS # BLD AUTO: 9.56 K/UL
NEUTROPHILS NFR BLD AUTO: 75.9 %
PLATELET # BLD AUTO: 513 K/UL
POTASSIUM SERPL-SCNC: 5.2 MMOL/L
PROT SERPL-MCNC: 7.3 G/DL
PT BLD: 31 SEC
RBC # BLD: 4.37 M/UL
RBC # FLD: 16.6 %
SODIUM SERPL-SCNC: 140 MMOL/L
WBC # FLD AUTO: 12.61 K/UL

## 2019-05-14 ENCOUNTER — RESULT CHARGE (OUTPATIENT)
Age: 69
End: 2019-05-14

## 2019-05-16 ENCOUNTER — LABORATORY RESULT (OUTPATIENT)
Age: 69
End: 2019-05-16

## 2019-05-23 ENCOUNTER — LABORATORY RESULT (OUTPATIENT)
Age: 69
End: 2019-05-23

## 2019-06-05 ENCOUNTER — LABORATORY RESULT (OUTPATIENT)
Age: 69
End: 2019-06-05

## 2019-06-11 ENCOUNTER — RX RENEWAL (OUTPATIENT)
Age: 69
End: 2019-06-11

## 2019-06-19 ENCOUNTER — LABORATORY RESULT (OUTPATIENT)
Age: 69
End: 2019-06-19

## 2019-07-15 LAB
ANAPLASMA PHAGOCYTO IGM COMENT: NORMAL
ANAPLASMA PHAGOCYTO IGM COMMENT: NORMAL
ANAPLASMA PHAGOCYTOPHILIA IGG ANTIBODIES: NORMAL
ANAPLASMA PHAGOCYTOPHILIA IGM ANTIBODIES: NORMAL
B BURGDOR AB SER-IMP: NEGATIVE
B BURGDOR IGG+IGM SER QL IB: NORMAL
B BURGDOR IGG+IGM SER QL: 0.03 INDEX
B MICROTI AB SER QL: NORMAL
BABESIA ANTIBODIES, IGG: NORMAL
BABESIA ANTIBODIES, IGM: NORMAL
EHRLICIA CHAFFEENIS IGG ANTIBODIES: NORMAL
EHRLICIA CHAFFEENIS IGG COMMENT: NORMAL
EHRLICIA CHAFFEENIS IGG INTERP: NORMAL
EHRLICIA CHAFFEENIS IGM ANTIBODIES: NORMAL
R RICKETTSI IGG CSF-ACNC: NEGATIVE
R RICKETTSI IGM CSF-ACNC: 1.69 INDEX

## 2019-07-18 ENCOUNTER — LABORATORY RESULT (OUTPATIENT)
Age: 69
End: 2019-07-18

## 2019-08-01 ENCOUNTER — RX RENEWAL (OUTPATIENT)
Age: 69
End: 2019-08-01

## 2019-08-15 ENCOUNTER — LABORATORY RESULT (OUTPATIENT)
Age: 69
End: 2019-08-15

## 2019-09-12 ENCOUNTER — LABORATORY RESULT (OUTPATIENT)
Age: 69
End: 2019-09-12

## 2019-09-26 ENCOUNTER — RESULT CHARGE (OUTPATIENT)
Age: 69
End: 2019-09-26

## 2019-09-27 ENCOUNTER — LABORATORY RESULT (OUTPATIENT)
Age: 69
End: 2019-09-27

## 2019-09-28 LAB
INR PPP: 0.97 RATIO
PT BLD: 11 SEC

## 2019-09-30 ENCOUNTER — APPOINTMENT (OUTPATIENT)
Dept: FAMILY MEDICINE | Facility: CLINIC | Age: 69
End: 2019-09-30
Payer: MEDICARE

## 2019-09-30 VITALS
HEIGHT: 67 IN | WEIGHT: 174 LBS | SYSTOLIC BLOOD PRESSURE: 131 MMHG | HEART RATE: 94 BPM | OXYGEN SATURATION: 99 % | TEMPERATURE: 98.5 F | BODY MASS INDEX: 27.31 KG/M2 | DIASTOLIC BLOOD PRESSURE: 75 MMHG

## 2019-09-30 DIAGNOSIS — E78.5 HYPERLIPIDEMIA, UNSPECIFIED: ICD-10-CM

## 2019-09-30 PROCEDURE — G0008: CPT

## 2019-09-30 PROCEDURE — 90686 IIV4 VACC NO PRSV 0.5 ML IM: CPT

## 2019-09-30 PROCEDURE — 99214 OFFICE O/P EST MOD 30 MIN: CPT | Mod: 25

## 2019-09-30 RX ORDER — DOXYCYCLINE HYCLATE 100 MG/1
100 TABLET ORAL
Qty: 28 | Refills: 0 | Status: DISCONTINUED | COMMUNITY
Start: 2018-04-06 | End: 2019-09-30

## 2019-10-01 ENCOUNTER — MED ADMIN CHARGE (OUTPATIENT)
Age: 69
End: 2019-10-01

## 2019-10-04 ENCOUNTER — LABORATORY RESULT (OUTPATIENT)
Age: 69
End: 2019-10-04

## 2019-10-06 LAB
ANAPLASMA PHAGOCYTO IGM COMENT: NORMAL
ANAPLASMA PHAGOCYTO IGM COMMENT: NORMAL
ANAPLASMA PHAGOCYTOPHILIA IGG ANTIBODIES: NORMAL
ANAPLASMA PHAGOCYTOPHILIA IGM ANTIBODIES: NORMAL
B BURGDOR AB SER-IMP: NEGATIVE
B BURGDOR IGG+IGM SER QL IB: NORMAL
B BURGDOR IGG+IGM SER QL: 0.02 INDEX
B MICROTI AB SER QL: NORMAL
BABESIA ANTIBODIES, IGG: NORMAL
BABESIA ANTIBODIES, IGM: NORMAL
EHRLICIA CHAFFEENIS IGG ANTIBODIES: NORMAL
EHRLICIA CHAFFEENIS IGG COMMENT: NORMAL
EHRLICIA CHAFFEENIS IGG INTERP: NORMAL
EHRLICIA CHAFFEENIS IGM ANTIBODIES: NORMAL
R RICKETTSI IGG CSF-ACNC: NEGATIVE
R RICKETTSI IGM CSF-ACNC: 1.04 INDEX

## 2019-10-14 ENCOUNTER — LABORATORY RESULT (OUTPATIENT)
Age: 69
End: 2019-10-14

## 2019-10-17 ENCOUNTER — APPOINTMENT (OUTPATIENT)
Dept: RHEUMATOLOGY | Facility: CLINIC | Age: 69
End: 2019-10-17
Payer: MEDICARE

## 2019-10-17 VITALS
HEART RATE: 84 BPM | DIASTOLIC BLOOD PRESSURE: 78 MMHG | SYSTOLIC BLOOD PRESSURE: 128 MMHG | OXYGEN SATURATION: 97 % | BODY MASS INDEX: 27.31 KG/M2 | WEIGHT: 174 LBS | HEIGHT: 67 IN | TEMPERATURE: 98.2 F

## 2019-10-17 DIAGNOSIS — Z86.19 PERSONAL HISTORY OF OTHER INFECTIOUS AND PARASITIC DISEASES: ICD-10-CM

## 2019-10-17 PROCEDURE — 99205 OFFICE O/P NEW HI 60 MIN: CPT

## 2019-10-17 RX ORDER — METHYLPREDNISOLONE 4 MG/1
4 TABLET ORAL
Qty: 21 | Refills: 0 | Status: COMPLETED | COMMUNITY
Start: 2019-07-24

## 2019-10-17 RX ORDER — COLCHICINE 0.6 MG/1
0.6 TABLET, FILM COATED ORAL
Qty: 15 | Refills: 0 | Status: COMPLETED | COMMUNITY
Start: 2019-04-30

## 2019-10-17 RX ORDER — METRONIDAZOLE 7.5 MG/G
0.75 CREAM TOPICAL
Qty: 45 | Refills: 0 | Status: COMPLETED | COMMUNITY
Start: 2019-06-13

## 2019-10-17 RX ORDER — OLMESARTAN MEDOXOMIL AND HYDROCHLOROTHIAZIDE 20; 12.5 MG/1; MG/1
20-12.5 TABLET ORAL
Qty: 90 | Refills: 0 | Status: COMPLETED | COMMUNITY
Start: 2019-05-03

## 2019-10-21 ENCOUNTER — APPOINTMENT (OUTPATIENT)
Dept: OPHTHALMOLOGY | Facility: CLINIC | Age: 69
End: 2019-10-21
Payer: MEDICARE

## 2019-10-21 ENCOUNTER — NON-APPOINTMENT (OUTPATIENT)
Age: 69
End: 2019-10-21

## 2019-10-21 PROCEDURE — 92004 COMPRE OPH EXAM NEW PT 1/>: CPT

## 2019-10-28 ENCOUNTER — LABORATORY RESULT (OUTPATIENT)
Age: 69
End: 2019-10-28

## 2019-10-30 ENCOUNTER — RX CHANGE (OUTPATIENT)
Age: 69
End: 2019-10-30

## 2019-11-01 PROBLEM — Z86.19 HISTORY OF ROCKY MOUNTAIN SPOTTED FEVER: Status: RESOLVED | Noted: 2018-04-06 | Resolved: 2019-11-01

## 2019-11-06 RX ORDER — PANTOPRAZOLE 40 MG/1
40 TABLET, DELAYED RELEASE ORAL DAILY
Qty: 90 | Refills: 3 | Status: DISCONTINUED | COMMUNITY
Start: 2019-05-03 | End: 2019-11-06

## 2019-11-25 ENCOUNTER — LABORATORY RESULT (OUTPATIENT)
Age: 69
End: 2019-11-25

## 2019-12-02 ENCOUNTER — RX RENEWAL (OUTPATIENT)
Age: 69
End: 2019-12-02

## 2019-12-24 ENCOUNTER — LABORATORY RESULT (OUTPATIENT)
Age: 69
End: 2019-12-24

## 2020-01-10 ENCOUNTER — APPOINTMENT (OUTPATIENT)
Dept: RHEUMATOLOGY | Facility: CLINIC | Age: 70
End: 2020-01-10
Payer: MEDICARE

## 2020-01-10 VITALS
HEIGHT: 67 IN | TEMPERATURE: 98.6 F | HEART RATE: 88 BPM | SYSTOLIC BLOOD PRESSURE: 177 MMHG | BODY MASS INDEX: 27.47 KG/M2 | OXYGEN SATURATION: 97 % | DIASTOLIC BLOOD PRESSURE: 91 MMHG | WEIGHT: 175 LBS

## 2020-01-10 DIAGNOSIS — R91.1 SOLITARY PULMONARY NODULE: ICD-10-CM

## 2020-01-10 PROCEDURE — 99215 OFFICE O/P EST HI 40 MIN: CPT

## 2020-01-22 ENCOUNTER — LABORATORY RESULT (OUTPATIENT)
Age: 70
End: 2020-01-22

## 2020-02-26 ENCOUNTER — LABORATORY RESULT (OUTPATIENT)
Age: 70
End: 2020-02-26

## 2020-03-12 ENCOUNTER — RX RENEWAL (OUTPATIENT)
Age: 70
End: 2020-03-12

## 2020-03-16 ENCOUNTER — RX RENEWAL (OUTPATIENT)
Age: 70
End: 2020-03-16

## 2020-03-19 ENCOUNTER — RX RENEWAL (OUTPATIENT)
Age: 70
End: 2020-03-19

## 2020-03-20 ENCOUNTER — RX CHANGE (OUTPATIENT)
Age: 70
End: 2020-03-20

## 2020-03-20 RX ORDER — DULOXETINE HYDROCHLORIDE 60 MG/1
60 CAPSULE, DELAYED RELEASE PELLETS ORAL
Qty: 30 | Refills: 2 | Status: DISCONTINUED | COMMUNITY
Start: 2019-10-17 | End: 2020-03-20

## 2020-03-30 RX ORDER — ESZOPICLONE 3 MG/1
3 TABLET, FILM COATED ORAL
Qty: 30 | Refills: 5 | Status: DISCONTINUED | COMMUNITY
Start: 2019-04-09 | End: 2020-03-30

## 2020-04-09 ENCOUNTER — APPOINTMENT (OUTPATIENT)
Dept: FAMILY MEDICINE | Facility: CLINIC | Age: 70
End: 2020-04-09
Payer: MEDICARE

## 2020-04-09 PROCEDURE — 99441: CPT

## 2020-04-10 ENCOUNTER — APPOINTMENT (OUTPATIENT)
Dept: RHEUMATOLOGY | Facility: CLINIC | Age: 70
End: 2020-04-10
Payer: MEDICARE

## 2020-04-10 PROCEDURE — 99441: CPT

## 2020-04-13 ENCOUNTER — APPOINTMENT (OUTPATIENT)
Dept: FAMILY MEDICINE | Facility: CLINIC | Age: 70
End: 2020-04-13
Payer: MEDICARE

## 2020-04-13 DIAGNOSIS — R04.0 EPISTAXIS: ICD-10-CM

## 2020-04-13 PROCEDURE — 99441: CPT

## 2020-04-23 ENCOUNTER — RX CHANGE (OUTPATIENT)
Age: 70
End: 2020-04-23

## 2020-04-23 RX ORDER — NORTRIPTYLINE HYDROCHLORIDE 10 MG/1
10 CAPSULE ORAL
Qty: 60 | Refills: 5 | Status: DISCONTINUED | COMMUNITY
Start: 2020-03-30 | End: 2020-04-23

## 2020-04-27 ENCOUNTER — RX RENEWAL (OUTPATIENT)
Age: 70
End: 2020-04-27

## 2020-06-05 ENCOUNTER — RX CHANGE (OUTPATIENT)
Age: 70
End: 2020-06-05

## 2020-06-05 RX ORDER — WARFARIN 3 MG/1
3 TABLET ORAL
Qty: 30 | Refills: 5 | Status: DISCONTINUED | COMMUNITY
Start: 2019-05-16 | End: 2020-06-05

## 2020-06-08 DIAGNOSIS — Z87.09 PERSONAL HISTORY OF OTHER DISEASES OF THE RESPIRATORY SYSTEM: ICD-10-CM

## 2020-06-11 ENCOUNTER — OUTPATIENT (OUTPATIENT)
Dept: OUTPATIENT SERVICES | Facility: HOSPITAL | Age: 70
LOS: 1 days | End: 2020-06-11

## 2020-06-19 ENCOUNTER — LABORATORY RESULT (OUTPATIENT)
Age: 70
End: 2020-06-19

## 2020-06-21 LAB
25(OH)D3 SERPL-MCNC: 14.9 NG/ML
ALBUMIN SERPL ELPH-MCNC: 4.1 G/DL
ALP BLD-CCNC: 61 U/L
ALT SERPL-CCNC: 12 U/L
ANA PAT FLD IF-IMP: ABNORMAL
ANA SER IF-ACNC: ABNORMAL
ANION GAP SERPL CALC-SCNC: 16 MMOL/L
APPEARANCE: CLEAR
AST SERPL-CCNC: 14 U/L
BASOPHILS # BLD AUTO: 0.05 K/UL
BASOPHILS NFR BLD AUTO: 0.4 %
BILIRUB SERPL-MCNC: 0.2 MG/DL
BILIRUBIN URINE: NEGATIVE
BLOOD URINE: NEGATIVE
BUN SERPL-MCNC: 15 MG/DL
CALCIUM SERPL-MCNC: 9.3 MG/DL
CHLORIDE SERPL-SCNC: 102 MMOL/L
CHOLEST SERPL-MCNC: 94 MG/DL
CHOLEST/HDLC SERPL: 3.7 RATIO
CO2 SERPL-SCNC: 21 MMOL/L
COLOR: YELLOW
CREAT SERPL-MCNC: 0.87 MG/DL
CREAT SPEC-SCNC: 105 MG/DL
EOSINOPHIL # BLD AUTO: 0.13 K/UL
EOSINOPHIL NFR BLD AUTO: 1 %
ERYTHROCYTE [SEDIMENTATION RATE] IN BLOOD BY WESTERGREN METHOD: 106 MM/HR
ESTIMATED AVERAGE GLUCOSE: 134 MG/DL
FERRITIN SERPL-MCNC: 27 NG/ML
FOLATE SERPL-MCNC: 5.2 NG/ML
GLUCOSE QUALITATIVE U: NEGATIVE
GLUCOSE SERPL-MCNC: 126 MG/DL
HBA1C MFR BLD HPLC: 6.3 %
HCT VFR BLD CALC: 34.8 %
HDLC SERPL-MCNC: 25 MG/DL
HGB BLD-MCNC: 10.5 G/DL
IMM GRANULOCYTES NFR BLD AUTO: 0.7 %
KETONES URINE: NEGATIVE
LDLC SERPL CALC-MCNC: 21 MG/DL
LEUKOCYTE ESTERASE URINE: NEGATIVE
LYMPHOCYTES # BLD AUTO: 3.12 K/UL
LYMPHOCYTES NFR BLD AUTO: 24.8 %
MAN DIFF?: NORMAL
MCHC RBC-ENTMCNC: 24.3 PG
MCHC RBC-ENTMCNC: 30.2 GM/DL
MCV RBC AUTO: 80.6 FL
MICROALBUMIN 24H UR DL<=1MG/L-MCNC: 3.2 MG/DL
MICROALBUMIN/CREAT 24H UR-RTO: 31 MG/G
MONOCYTES # BLD AUTO: 0.6 K/UL
MONOCYTES NFR BLD AUTO: 4.8 %
NEUTROPHILS # BLD AUTO: 8.61 K/UL
NEUTROPHILS NFR BLD AUTO: 68.3 %
NITRITE URINE: NEGATIVE
PH URINE: 6
PLATELET # BLD AUTO: 556 K/UL
POTASSIUM SERPL-SCNC: 3.5 MMOL/L
PROT SERPL-MCNC: 6.8 G/DL
PROTEIN URINE: ABNORMAL
RBC # BLD: 4.32 M/UL
RBC # FLD: 18.9 %
RHEUMATOID FACT SER QL: <10 IU/ML
SODIUM SERPL-SCNC: 139 MMOL/L
SPECIFIC GRAVITY URINE: 1.03
T3RU NFR SERPL: 1 TBI
T4 FREE SERPL-MCNC: 1.3 NG/DL
TRIGL SERPL-MCNC: 239 MG/DL
TSH SERPL-ACNC: 1.75 UIU/ML
UROBILINOGEN URINE: NORMAL
VIT B12 SERPL-MCNC: 543 PG/ML
WBC # FLD AUTO: 12.6 K/UL

## 2020-06-25 ENCOUNTER — APPOINTMENT (OUTPATIENT)
Dept: FAMILY MEDICINE | Facility: CLINIC | Age: 70
End: 2020-06-25
Payer: MEDICARE

## 2020-06-25 VITALS
HEART RATE: 75 BPM | OXYGEN SATURATION: 98 % | WEIGHT: 165 LBS | SYSTOLIC BLOOD PRESSURE: 94 MMHG | DIASTOLIC BLOOD PRESSURE: 60 MMHG | RESPIRATION RATE: 16 BRPM | BODY MASS INDEX: 25.9 KG/M2 | HEIGHT: 67 IN

## 2020-06-25 PROCEDURE — G0439: CPT

## 2020-06-25 PROCEDURE — 99173 VISUAL ACUITY SCREEN: CPT | Mod: GY

## 2020-06-25 PROCEDURE — G0444 DEPRESSION SCREEN ANNUAL: CPT | Mod: 59

## 2020-06-25 RX ORDER — AMOXICILLIN AND CLAVULANATE POTASSIUM 500; 125 MG/1; MG/1
500-125 TABLET, FILM COATED ORAL
Qty: 20 | Refills: 0 | Status: DISCONTINUED | COMMUNITY
Start: 2020-06-08 | End: 2020-06-25

## 2020-06-25 RX ORDER — AMIODARONE HYDROCHLORIDE 200 MG/1
200 TABLET ORAL DAILY
Qty: 90 | Refills: 3 | Status: DISCONTINUED | COMMUNITY
Start: 2019-04-09 | End: 2020-06-25

## 2020-06-26 ENCOUNTER — RX CHANGE (OUTPATIENT)
Age: 70
End: 2020-06-26

## 2020-07-01 ENCOUNTER — RX CHANGE (OUTPATIENT)
Age: 70
End: 2020-07-01

## 2020-07-07 RX ORDER — WARFARIN 4 MG/1
4 TABLET ORAL DAILY
Qty: 30 | Refills: 5 | Status: DISCONTINUED | COMMUNITY
Start: 2019-02-08 | End: 2020-07-07

## 2020-07-07 RX ORDER — DILTIAZEM HYDROCHLORIDE 180 MG/1
180 CAPSULE, EXTENDED RELEASE ORAL DAILY
Qty: 90 | Refills: 3 | Status: DISCONTINUED | COMMUNITY
End: 2020-07-07

## 2020-07-29 ENCOUNTER — INPATIENT (INPATIENT)
Facility: HOSPITAL | Age: 70
LOS: 6 days | Discharge: HOME | DRG: 315 | End: 2020-08-05
Attending: SURGERY | Admitting: SURGERY
Payer: MEDICARE

## 2020-07-29 VITALS
HEART RATE: 63 BPM | OXYGEN SATURATION: 98 % | HEIGHT: 67 IN | SYSTOLIC BLOOD PRESSURE: 87 MMHG | TEMPERATURE: 98 F | WEIGHT: 160.06 LBS | DIASTOLIC BLOOD PRESSURE: 40 MMHG | RESPIRATION RATE: 18 BRPM

## 2020-07-29 DIAGNOSIS — I95.9 HYPOTENSION, UNSPECIFIED: ICD-10-CM

## 2020-07-29 LAB
ABO RH CONFIRMATION: SIGNIFICANT CHANGE UP
ALBUMIN SERPL ELPH-MCNC: 2.9 G/DL — LOW (ref 3.3–5.2)
ALP SERPL-CCNC: 91 U/L — SIGNIFICANT CHANGE UP (ref 40–120)
ALT FLD-CCNC: 13 U/L — SIGNIFICANT CHANGE UP
ANION GAP SERPL CALC-SCNC: 14 MMOL/L — SIGNIFICANT CHANGE UP (ref 5–17)
ANION GAP SERPL CALC-SCNC: 17 MMOL/L — SIGNIFICANT CHANGE UP (ref 5–17)
APTT BLD: 29.7 SEC — SIGNIFICANT CHANGE UP (ref 27.5–35.5)
APTT BLD: 36.8 SEC — HIGH (ref 27.5–35.5)
AST SERPL-CCNC: 30 U/L — SIGNIFICANT CHANGE UP
BASOPHILS # BLD AUTO: 0 K/UL — SIGNIFICANT CHANGE UP (ref 0–0.2)
BASOPHILS # BLD AUTO: 0.01 K/UL — SIGNIFICANT CHANGE UP (ref 0–0.2)
BASOPHILS NFR BLD AUTO: 0 % — SIGNIFICANT CHANGE UP (ref 0–2)
BASOPHILS NFR BLD AUTO: 0.1 % — SIGNIFICANT CHANGE UP (ref 0–2)
BILIRUB SERPL-MCNC: 0.4 MG/DL — SIGNIFICANT CHANGE UP (ref 0.4–2)
BUN SERPL-MCNC: 59 MG/DL — HIGH (ref 8–20)
BUN SERPL-MCNC: 68 MG/DL — HIGH (ref 8–20)
CALCIUM SERPL-MCNC: 7.8 MG/DL — LOW (ref 8.6–10.2)
CALCIUM SERPL-MCNC: 9.1 MG/DL — SIGNIFICANT CHANGE UP (ref 8.6–10.2)
CHLORIDE SERPL-SCNC: 105 MMOL/L — SIGNIFICANT CHANGE UP (ref 98–107)
CHLORIDE SERPL-SCNC: 99 MMOL/L — SIGNIFICANT CHANGE UP (ref 98–107)
CO2 SERPL-SCNC: 14 MMOL/L — LOW (ref 22–29)
CO2 SERPL-SCNC: 16 MMOL/L — LOW (ref 22–29)
CREAT SERPL-MCNC: 2.04 MG/DL — HIGH (ref 0.5–1.3)
CREAT SERPL-MCNC: 2.47 MG/DL — HIGH (ref 0.5–1.3)
EOSINOPHIL # BLD AUTO: 0.11 K/UL — SIGNIFICANT CHANGE UP (ref 0–0.5)
EOSINOPHIL # BLD AUTO: 0.17 K/UL — SIGNIFICANT CHANGE UP (ref 0–0.5)
EOSINOPHIL NFR BLD AUTO: 1.5 % — SIGNIFICANT CHANGE UP (ref 0–6)
EOSINOPHIL NFR BLD AUTO: 1.8 % — SIGNIFICANT CHANGE UP (ref 0–6)
GLUCOSE SERPL-MCNC: 76 MG/DL — SIGNIFICANT CHANGE UP (ref 70–99)
GLUCOSE SERPL-MCNC: 83 MG/DL — SIGNIFICANT CHANGE UP (ref 70–99)
HCT VFR BLD CALC: 26.9 % — LOW (ref 34.5–45)
HCT VFR BLD CALC: 32.7 % — LOW (ref 34.5–45)
HCT VFR BLD CALC: 37.1 % — SIGNIFICANT CHANGE UP (ref 34.5–45)
HGB BLD-MCNC: 11.7 G/DL — SIGNIFICANT CHANGE UP (ref 11.5–15.5)
HGB BLD-MCNC: 8.3 G/DL — LOW (ref 11.5–15.5)
HGB BLD-MCNC: 9.8 G/DL — LOW (ref 11.5–15.5)
IMM GRANULOCYTES NFR BLD AUTO: 0.4 % — SIGNIFICANT CHANGE UP (ref 0–1.5)
INR BLD: 1.74 RATIO — HIGH (ref 0.88–1.16)
INR BLD: 2.39 RATIO — HIGH (ref 0.88–1.16)
LACTATE BLDV-MCNC: 0.7 MMOL/L — SIGNIFICANT CHANGE UP (ref 0.5–2)
LYMPHOCYTES # BLD AUTO: 1.23 K/UL — SIGNIFICANT CHANGE UP (ref 1–3.3)
LYMPHOCYTES # BLD AUTO: 1.34 K/UL — SIGNIFICANT CHANGE UP (ref 1–3.3)
LYMPHOCYTES # BLD AUTO: 14 % — SIGNIFICANT CHANGE UP (ref 13–44)
LYMPHOCYTES # BLD AUTO: 16.6 % — SIGNIFICANT CHANGE UP (ref 13–44)
MCHC RBC-ENTMCNC: 25.6 PG — LOW (ref 27–34)
MCHC RBC-ENTMCNC: 25.9 PG — LOW (ref 27–34)
MCHC RBC-ENTMCNC: 27 PG — SIGNIFICANT CHANGE UP (ref 27–34)
MCHC RBC-ENTMCNC: 30 GM/DL — LOW (ref 32–36)
MCHC RBC-ENTMCNC: 30.9 GM/DL — LOW (ref 32–36)
MCHC RBC-ENTMCNC: 31.5 GM/DL — LOW (ref 32–36)
MCV RBC AUTO: 84.1 FL — SIGNIFICANT CHANGE UP (ref 80–100)
MCV RBC AUTO: 85.4 FL — SIGNIFICANT CHANGE UP (ref 80–100)
MCV RBC AUTO: 85.5 FL — SIGNIFICANT CHANGE UP (ref 80–100)
MONOCYTES # BLD AUTO: 0.34 K/UL — SIGNIFICANT CHANGE UP (ref 0–0.9)
MONOCYTES # BLD AUTO: 0.42 K/UL — SIGNIFICANT CHANGE UP (ref 0–0.9)
MONOCYTES NFR BLD AUTO: 3.5 % — SIGNIFICANT CHANGE UP (ref 2–14)
MONOCYTES NFR BLD AUTO: 5.7 % — SIGNIFICANT CHANGE UP (ref 2–14)
NEUTROPHILS # BLD AUTO: 5.61 K/UL — SIGNIFICANT CHANGE UP (ref 1.8–7.4)
NEUTROPHILS # BLD AUTO: 7.73 K/UL — HIGH (ref 1.8–7.4)
NEUTROPHILS NFR BLD AUTO: 75.4 % — SIGNIFICANT CHANGE UP (ref 43–77)
NEUTROPHILS NFR BLD AUTO: 75.7 % — SIGNIFICANT CHANGE UP (ref 43–77)
PLATELET # BLD AUTO: 404 K/UL — HIGH (ref 150–400)
PLATELET # BLD AUTO: 405 K/UL — HIGH (ref 150–400)
PLATELET # BLD AUTO: 514 K/UL — HIGH (ref 150–400)
POTASSIUM SERPL-MCNC: 4.1 MMOL/L — SIGNIFICANT CHANGE UP (ref 3.5–5.3)
POTASSIUM SERPL-MCNC: 5.8 MMOL/L — HIGH (ref 3.5–5.3)
POTASSIUM SERPL-SCNC: 4.1 MMOL/L — SIGNIFICANT CHANGE UP (ref 3.5–5.3)
POTASSIUM SERPL-SCNC: 5.8 MMOL/L — HIGH (ref 3.5–5.3)
PROT SERPL-MCNC: 7 G/DL — SIGNIFICANT CHANGE UP (ref 6.6–8.7)
PROTHROM AB SERPL-ACNC: 19.7 SEC — HIGH (ref 10.6–13.6)
PROTHROM AB SERPL-ACNC: 26.6 SEC — HIGH (ref 10.6–13.6)
RBC # BLD: 3.2 M/UL — LOW (ref 3.8–5.2)
RBC # BLD: 3.83 M/UL — SIGNIFICANT CHANGE UP (ref 3.8–5.2)
RBC # BLD: 4.34 M/UL — SIGNIFICANT CHANGE UP (ref 3.8–5.2)
RBC # FLD: 20.2 % — HIGH (ref 10.3–14.5)
RBC # FLD: 23 % — HIGH (ref 10.3–14.5)
RBC # FLD: 23.4 % — HIGH (ref 10.3–14.5)
SARS-COV-2 RNA SPEC QL NAA+PROBE: SIGNIFICANT CHANGE UP
SODIUM SERPL-SCNC: 131 MMOL/L — LOW (ref 135–145)
SODIUM SERPL-SCNC: 133 MMOL/L — LOW (ref 135–145)
WBC # BLD: 7.41 K/UL — SIGNIFICANT CHANGE UP (ref 3.8–10.5)
WBC # BLD: 7.93 K/UL — SIGNIFICANT CHANGE UP (ref 3.8–10.5)
WBC # BLD: 9.58 K/UL — SIGNIFICANT CHANGE UP (ref 3.8–10.5)
WBC # FLD AUTO: 7.41 K/UL — SIGNIFICANT CHANGE UP (ref 3.8–10.5)
WBC # FLD AUTO: 7.93 K/UL — SIGNIFICANT CHANGE UP (ref 3.8–10.5)
WBC # FLD AUTO: 9.58 K/UL — SIGNIFICANT CHANGE UP (ref 3.8–10.5)

## 2020-07-29 PROCEDURE — 93010 ELECTROCARDIOGRAM REPORT: CPT

## 2020-07-29 PROCEDURE — 71045 X-RAY EXAM CHEST 1 VIEW: CPT | Mod: 26

## 2020-07-29 PROCEDURE — 74176 CT ABD & PELVIS W/O CONTRAST: CPT | Mod: 26

## 2020-07-29 PROCEDURE — 99291 CRITICAL CARE FIRST HOUR: CPT | Mod: CS

## 2020-07-29 RX ORDER — INSULIN LISPRO 100/ML
VIAL (ML) SUBCUTANEOUS
Refills: 0 | Status: DISCONTINUED | OUTPATIENT
Start: 2020-07-29 | End: 2020-08-05

## 2020-07-29 RX ORDER — SODIUM CHLORIDE 9 MG/ML
1000 INJECTION INTRAMUSCULAR; INTRAVENOUS; SUBCUTANEOUS ONCE
Refills: 0 | Status: COMPLETED | OUTPATIENT
Start: 2020-07-29 | End: 2020-07-29

## 2020-07-29 RX ORDER — DILTIAZEM HCL 120 MG
180 CAPSULE, EXT RELEASE 24 HR ORAL DAILY
Refills: 0 | Status: DISCONTINUED | OUTPATIENT
Start: 2020-07-29 | End: 2020-08-05

## 2020-07-29 RX ORDER — PROTHROMBIN COMPLEX CONCENTRATE (HUMAN) 25.5; 16.5; 24; 22; 22; 26 [IU]/ML; [IU]/ML; [IU]/ML; [IU]/ML; [IU]/ML; [IU]/ML
2000 POWDER, FOR SOLUTION INTRAVENOUS ONCE
Refills: 0 | Status: COMPLETED | OUTPATIENT
Start: 2020-07-29 | End: 2020-07-29

## 2020-07-29 RX ORDER — NORTRIPTYLINE HYDROCHLORIDE 10 MG/1
10 CAPSULE ORAL DAILY
Refills: 0 | Status: DISCONTINUED | OUTPATIENT
Start: 2020-07-29 | End: 2020-08-05

## 2020-07-29 RX ORDER — DEXTROSE 50 % IN WATER 50 %
25 SYRINGE (ML) INTRAVENOUS ONCE
Refills: 0 | Status: DISCONTINUED | OUTPATIENT
Start: 2020-07-29 | End: 2020-08-05

## 2020-07-29 RX ORDER — METOPROLOL TARTRATE 50 MG
1 TABLET ORAL
Qty: 0 | Refills: 0 | DISCHARGE

## 2020-07-29 RX ORDER — DILTIAZEM HCL 120 MG
1 CAPSULE, EXT RELEASE 24 HR ORAL
Qty: 0 | Refills: 0 | DISCHARGE

## 2020-07-29 RX ORDER — METOPROLOL TARTRATE 50 MG
50 TABLET ORAL DAILY
Refills: 0 | Status: DISCONTINUED | OUTPATIENT
Start: 2020-07-29 | End: 2020-08-05

## 2020-07-29 RX ORDER — OLMESARTAN MEDOXOMIL-HYDROCHLOROTHIAZIDE 25; 40 MG/1; MG/1
1 TABLET, FILM COATED ORAL
Qty: 0 | Refills: 0 | DISCHARGE

## 2020-07-29 RX ORDER — SODIUM BICARBONATE 1 MEQ/ML
50 SYRINGE (ML) INTRAVENOUS ONCE
Refills: 0 | Status: COMPLETED | OUTPATIENT
Start: 2020-07-29 | End: 2020-07-29

## 2020-07-29 RX ORDER — CALCIUM GLUCONATE 100 MG/ML
1 VIAL (ML) INTRAVENOUS ONCE
Refills: 0 | Status: DISCONTINUED | OUTPATIENT
Start: 2020-07-29 | End: 2020-07-29

## 2020-07-29 RX ORDER — ONDANSETRON 8 MG/1
4 TABLET, FILM COATED ORAL ONCE
Refills: 0 | Status: COMPLETED | OUTPATIENT
Start: 2020-07-29 | End: 2020-07-29

## 2020-07-29 RX ORDER — ALLOPURINOL 300 MG
100 TABLET ORAL ONCE
Refills: 0 | Status: COMPLETED | OUTPATIENT
Start: 2020-07-29 | End: 2020-07-29

## 2020-07-29 RX ORDER — ALLOPURINOL 300 MG
1 TABLET ORAL
Qty: 0 | Refills: 0 | DISCHARGE

## 2020-07-29 RX ORDER — DEXTROSE 50 % IN WATER 50 %
12.5 SYRINGE (ML) INTRAVENOUS ONCE
Refills: 0 | Status: DISCONTINUED | OUTPATIENT
Start: 2020-07-29 | End: 2020-08-05

## 2020-07-29 RX ORDER — ROSUVASTATIN CALCIUM 5 MG/1
1 TABLET ORAL
Qty: 0 | Refills: 0 | DISCHARGE

## 2020-07-29 RX ORDER — NORTRIPTYLINE HYDROCHLORIDE 10 MG/1
10 CAPSULE ORAL DAILY
Refills: 0 | Status: DISCONTINUED | OUTPATIENT
Start: 2020-07-29 | End: 2020-07-29

## 2020-07-29 RX ORDER — NORTRIPTYLINE HYDROCHLORIDE 10 MG/1
1 CAPSULE ORAL
Qty: 0 | Refills: 0 | DISCHARGE

## 2020-07-29 RX ORDER — ASPIRIN/CALCIUM CARB/MAGNESIUM 324 MG
1 TABLET ORAL
Qty: 0 | Refills: 0 | DISCHARGE

## 2020-07-29 RX ORDER — DEXTROSE 50 % IN WATER 50 %
15 SYRINGE (ML) INTRAVENOUS ONCE
Refills: 0 | Status: DISCONTINUED | OUTPATIENT
Start: 2020-07-29 | End: 2020-08-05

## 2020-07-29 RX ORDER — ACETAMINOPHEN 500 MG
650 TABLET ORAL EVERY 6 HOURS
Refills: 0 | Status: DISCONTINUED | OUTPATIENT
Start: 2020-07-29 | End: 2020-08-05

## 2020-07-29 RX ORDER — ATORVASTATIN CALCIUM 80 MG/1
80 TABLET, FILM COATED ORAL AT BEDTIME
Refills: 0 | Status: DISCONTINUED | OUTPATIENT
Start: 2020-07-29 | End: 2020-08-05

## 2020-07-29 RX ORDER — INSULIN HUMAN 100 [IU]/ML
6 INJECTION, SOLUTION SUBCUTANEOUS ONCE
Refills: 0 | Status: DISCONTINUED | OUTPATIENT
Start: 2020-07-29 | End: 2020-07-29

## 2020-07-29 RX ORDER — DILTIAZEM HCL 120 MG
180 CAPSULE, EXT RELEASE 24 HR ORAL DAILY
Refills: 0 | Status: DISCONTINUED | OUTPATIENT
Start: 2020-07-29 | End: 2020-07-29

## 2020-07-29 RX ORDER — METFORMIN HYDROCHLORIDE 850 MG/1
1 TABLET ORAL
Qty: 0 | Refills: 0 | DISCHARGE

## 2020-07-29 RX ORDER — METOPROLOL TARTRATE 50 MG
50 TABLET ORAL
Refills: 0 | Status: DISCONTINUED | OUTPATIENT
Start: 2020-07-29 | End: 2020-07-29

## 2020-07-29 RX ORDER — GLUCAGON INJECTION, SOLUTION 0.5 MG/.1ML
1 INJECTION, SOLUTION SUBCUTANEOUS ONCE
Refills: 0 | Status: DISCONTINUED | OUTPATIENT
Start: 2020-07-29 | End: 2020-08-05

## 2020-07-29 RX ORDER — DEXTROSE 50 % IN WATER 50 %
50 SYRINGE (ML) INTRAVENOUS ONCE
Refills: 0 | Status: DISCONTINUED | OUTPATIENT
Start: 2020-07-29 | End: 2020-07-29

## 2020-07-29 RX ORDER — APIXABAN 2.5 MG/1
1 TABLET, FILM COATED ORAL
Qty: 0 | Refills: 0 | DISCHARGE

## 2020-07-29 RX ORDER — SODIUM CHLORIDE 9 MG/ML
1000 INJECTION, SOLUTION INTRAVENOUS
Refills: 0 | Status: DISCONTINUED | OUTPATIENT
Start: 2020-07-29 | End: 2020-08-04

## 2020-07-29 RX ORDER — ACETAMINOPHEN 500 MG
1000 TABLET ORAL ONCE
Refills: 0 | Status: COMPLETED | OUTPATIENT
Start: 2020-07-29 | End: 2020-07-29

## 2020-07-29 RX ADMIN — SODIUM CHLORIDE 1000 MILLILITER(S): 9 INJECTION INTRAMUSCULAR; INTRAVENOUS; SUBCUTANEOUS at 16:55

## 2020-07-29 RX ADMIN — PROTHROMBIN COMPLEX CONCENTRATE (HUMAN) 400 INTERNATIONAL UNIT(S): 25.5; 16.5; 24; 22; 22; 26 POWDER, FOR SOLUTION INTRAVENOUS at 19:10

## 2020-07-29 RX ADMIN — ATORVASTATIN CALCIUM 80 MILLIGRAM(S): 80 TABLET, FILM COATED ORAL at 22:30

## 2020-07-29 RX ADMIN — PROTHROMBIN COMPLEX CONCENTRATE (HUMAN) 2000 INTERNATIONAL UNIT(S): 25.5; 16.5; 24; 22; 22; 26 POWDER, FOR SOLUTION INTRAVENOUS at 19:22

## 2020-07-29 RX ADMIN — SODIUM CHLORIDE 1000 MILLILITER(S): 9 INJECTION INTRAMUSCULAR; INTRAVENOUS; SUBCUTANEOUS at 18:00

## 2020-07-29 RX ADMIN — Medication 1000 MILLIGRAM(S): at 16:08

## 2020-07-29 RX ADMIN — Medication 400 MILLIGRAM(S): at 15:38

## 2020-07-29 RX ADMIN — ONDANSETRON 4 MILLIGRAM(S): 8 TABLET, FILM COATED ORAL at 15:40

## 2020-07-29 RX ADMIN — SODIUM CHLORIDE 1000 MILLILITER(S): 9 INJECTION INTRAMUSCULAR; INTRAVENOUS; SUBCUTANEOUS at 15:37

## 2020-07-29 RX ADMIN — Medication 1000 MILLIGRAM(S): at 16:00

## 2020-07-29 RX ADMIN — Medication 100 MILLIGRAM(S): at 22:25

## 2020-07-29 NOTE — ED PROCEDURE NOTE - GENERAL PROCEDURE DETAILS
Educational echocardiogram.  Grossly normal EF, no D sign, no pericardial effusion.  Confirmatory study to follow.

## 2020-07-29 NOTE — H&P ADULT - ASSESSMENT
ASSESSMENT: Patient is a 69y old female with lower back hematoma 2/2 to mechanical fall 5 days ago with decreasing H/H. Patient also found to be hypotensive in the ED, asymptomatic. Got 2 U PRBC, 2L bolus, and Kayexalate in the ER.     PLAN:    - Admit to Trauma under Dr. Judd  - Trend H/H, monitor after blood transfusion  - Evaluate back for expansion of hematoma  - IVF  - pain control  - Continue BP medications  - Hold Eliquis for now  - strict I/Os  - Patient seen/examined with attending Dr. Judd

## 2020-07-29 NOTE — ED PROVIDER NOTE - MUSCULOSKELETAL MINIMAL EXAM
large area of ecchymoses from mid lumbar down through buttocks;  ttp; no deformity; no lacerationno bleeding

## 2020-07-29 NOTE — ED ADULT TRIAGE NOTE - CHIEF COMPLAINT QUOTE
RLQ abdominal pain. Fall on elliquis with bruising to back 1 week ago, Stated she did not get medical treatment. Denies pain to back. Pt hypotensive in triage.

## 2020-07-29 NOTE — PHARMACOTHERAPY INTERVENTION NOTE - COMMENTS
Spoke to patient at bedside to obtain medication list. On Eliquis 5 mG BID for atrial fibrillation, last dose 8am as per patient. On several BP medications, as per pt has not taken any today.
Hypotension

## 2020-07-29 NOTE — ED ADULT NURSE NOTE - OBJECTIVE STATEMENT
pt A&Ox4, c/o LLQ abd pain and nausea that started about 4hrs ago, pt A&Ox4, c/o LLQ abd pain and nausea that started about 4hrs ago, pt states that she had was dizzy and fell about 5days ago and fell and back hit toilet, pt has ecchymosis and hematoma to lower bach and buttocks, pt on Eliquis  pt denies loc or head trauma or numbness/tingling, resp even and unlabored no distress noted, abd soft and tender to left lower quad

## 2020-07-29 NOTE — ED PROVIDER NOTE - PROGRESS NOTE DETAILS
patient persistently hypotensive; rectally afebrile; no signs of infection; concern for bleeding into hematoma  trauma consult called  transfuse, and [facility] trauma evaluating patient; transfusion in process; BP now up to 80 systolic  trauma to admit Blair: Maintaining SBP , intact mental status, clinically well perfused. Will be admitted to trauma service, 2u PRBC complete, pending repeat labs 2hr post transfusion

## 2020-07-29 NOTE — PATIENT PROFILE ADULT - PACKS PER DAY
Anxiety and chest pressure that started during her putting her dog to sleep. States that she has a history of panic attacks and is currently feeling that wasy. Pt is unable to sit still on the bed. Very restless and tearful   1

## 2020-07-29 NOTE — H&P ADULT - ATTENDING COMMENTS
The patient was seen and examined  Details per the resident's H&P  This is a 69-year old woman who presents to the ED because of dizziness and back pain  The patient had a ground level fall (slip and fall) 5 days prior to presentation  She happens to take Eliquis for atrial fibrillation  The patient was worked up by the ED and found to have a large hematoma on her back  She also had an episode of hypotension    At the time of my evaluation she had just been administered 4-factor PCC and was transfused 1 unit PRBC  Noted that the patient had a non-contrast CT    Exam:  Awake and alert.  GCS=15  Back:  Lower back with a 15 x 6 cm hematoma    Plan:  CXR now  Continue all home meds except NOAC  Follow Hgb and coagulation studies  DVT prophylaxis

## 2020-07-29 NOTE — ED PROVIDER NOTE - CLINICAL SUMMARY MEDICAL DECISION MAKING FREE TEXT BOX
fall x 5 days ago, on eliquis, with low back and abdominal pain  hypotensive, abdomen non tender  concern for retroperitoneal bleed  labs, ct

## 2020-07-29 NOTE — ED ADULT NURSE REASSESSMENT NOTE - NS ED NURSE REASSESS COMMENT FT1
pt A&Ox4,resp even and unlabored no distress noted, pt laying in stretcher and appears comfortable, gave report to 2gul RN and pt to be transported to floor on cardiac monitor with x2 RN's

## 2020-07-29 NOTE — H&P ADULT - HISTORY OF PRESENT ILLNESS
Trauma Admission    HPI: 69y Female on Eliquis for Afib who fell 5 days ago in her shower and landed on her back, had no LOC, got up by herself, and was feeling fine. Today she felt dizzy and her back pain was getting worse, reason for coming to the hospital. Patient states that she has had persistent back pain for the past 2-3 years but it got worse with her fall. No other complaints. Denies fever, chills, nausea, vomiting, chest pain, and sob.     ROS: 10-system review is otherwise negative except HPI above.      PAST MEDICAL & SURGICAL HISTORY:  Diabetes  Hypertension  Afib  Diverticulitis  Raynaud's  NIDDM  Soraya's procedure  Soraya's reversal  Parastomal hernia repair x5     FAMILY HISTORY:  Family history no pertinent as reviewed with the patient    SOCIAL HISTORY: No toxic habits. Smoked 1PPD for 40 years, quitted when 63 y/o.    ALLERGIES: NKA No Known Allergies      HOME MEDICATIONS:   allopurinol 100 mg oral tablet: 1 tab(s) orally once a day (29 Jul 2020 18:24)  aspirin 81 mg oral delayed release tablet: 1 tab(s) orally once a day (29 Jul 2020 18:24)  dilTIAZem 180 mg/24 hours oral capsule, extended release: 1 cap(s) orally once a day (29 Jul 2020 18:24)  Eliquis 5 mg oral tablet: 1 tab(s) orally 2 times a day (29 Jul 2020 18:24)  glipiZIDE 5 mg oral tablet: 1 tab(s) orally once a day (29 Jul 2020 18:24)  metFORMIN 500 mg oral tablet: 1 tab(s) orally 2 times a day (29 Jul 2020 18:24)  metoprolol tartrate 50 mg oral tablet: 1 tab(s) orally 2 times a day (29 Jul 2020 18:24)  nortriptyline 10 mg oral capsule: 1-2 cap(s) orally once a day at bedtime (29 Jul 2020 18:24)  olmesartan-hydrochlorothiazide 40 mg-25 mg oral tablet: 1 tab(s) orally once a day (29 Jul 2020 18:24)  rosuvastatin 20 mg oral tablet: 1 tab(s) orally once a day (29 Jul 2020 18:24)  --------------------------------------------------------------------------------------------    PHYSICAL EXAM:  General: NAD, Lying in bed comfortably  Neuro: A+Ox3  HEENT: EOMI, PERRLA, MMM. Pupils 2mm b/l. No blood on oral nor nasal cavity. Head is atraumatic.  Neck: Soft, supple  Cardio: RRR   Resp: Non labored breathing on RA  Thorax: No chest wall tenderness  Back: No tenderness on C/T/L spine. Lower midline area of fluctuance approximately 15x6cm that is non tender to palpation.   Breast: No lesions/masses, no drainage  GI/Abd: Soft, NT/ND, no rebound/guarding, no masses palpated. Midline scar c/d/i.   Vascular: All 4 extremities warm.  Skin: Intact, no breakdown  Lymphatic/Nodes: No palpable lymphadenopathy  Pelvis: stable  Musculoskeletal: All 4 extremities moving spontaneously.  --------------------------------------------------------------------------------------------    LABS                 8.3    7.93   )----------(  405       ( 29 Jul 2020 18:38 )               26.9      133    |  105    |  59.0   ----------------------------<  83         ( 29 Jul 2020 18:38 )  4.1     |  14.0   |  2.04     Ca    7.8        ( 29 Jul 2020 18:38 )    TPro  7.0    /  Alb  2.9    /  TBili  0.4    /  DBili  x      /  AST  30     /  ALT  13     /  AlkPhos  91     ( 29 Jul 2020 16:04 )    LIVER FUNCTIONS - ( 29 Jul 2020 16:04 )  Alb: 2.9 g/dL / Pro: 7.0 g/dL / ALK PHOS: 91 U/L / ALT: 13 U/L / AST: 30 U/L / GGT: x           PT/INR -  26.6 sec / 2.39 ratio   ( 29 Jul 2020 16:04 )       PTT -  29.7 sec   ( 29 Jul 2020 16:04 )  CAPILLARY BLOOD GLUCOSE    CARDIAC MARKERS ( 29 Jul 2020 18:38 )  x     / <0.01 ng/mL / x     / x     / x              18:32 - VBG - pH:       | pCO2:       | pO2:       | Lactate: 0.7      --------------------------------------------------------------------------------------------  IMAGING  CT abd/pel: non specific mesenteric inflammation. Distended GB. Possible hematoma in lower lumbar region.

## 2020-07-29 NOTE — ED PROVIDER NOTE - OBJECTIVE STATEMENT
68 yo female with hx htn, dm, paroxysmal a fib on eliquis , c/o low back pain and abdominal pain 68 yo female with hx htn, dm, paroxysmal a fib on eliquis , c/o low back pain and abdominal pain  Patient states she fell about 5 days ago, hitting back against toilet  denies any head injury or loc  c/o episodes of dizziness since then, worse today  today also with left inguinal pain  denies nausea or vomiting  denies feve ror chills  denies dysuria

## 2020-07-29 NOTE — ED ADULT NURSE NOTE - NSIMPLEMENTINTERV_GEN_ALL_ED
Implemented All Fall with Harm Risk Interventions:  [place] to call system. Call bell, personal items and telephone within reach. Instruct patient to call for assistance. Room bathroom lighting operational. Non-slip footwear when patient is off stretcher. Physically safe environment: no spills, clutter or unnecessary equipment. Stretcher in lowest position, wheels locked, appropriate side rails in place. Provide visual cue, wrist band, yellow gown, etc. Monitor gait and stability. Monitor for mental status changes and reorient to person, place, and time. Review medications for side effects contributing to fall risk. Reinforce activity limits and safety measures with patient and family. Provide visual clues: red socks.

## 2020-07-30 LAB
A1C WITH ESTIMATED AVERAGE GLUCOSE RESULT: 5.5 % — SIGNIFICANT CHANGE UP (ref 4–5.6)
ANION GAP SERPL CALC-SCNC: 14 MMOL/L — SIGNIFICANT CHANGE UP (ref 5–17)
ANION GAP SERPL CALC-SCNC: 16 MMOL/L — SIGNIFICANT CHANGE UP (ref 5–17)
ANION GAP SERPL CALC-SCNC: 18 MMOL/L — HIGH (ref 5–17)
APPEARANCE UR: ABNORMAL
APTT BLD: 35.9 SEC — HIGH (ref 27.5–35.5)
BACTERIA # UR AUTO: ABNORMAL
BASOPHILS # BLD AUTO: 0.02 K/UL — SIGNIFICANT CHANGE UP (ref 0–0.2)
BASOPHILS NFR BLD AUTO: 0.2 % — SIGNIFICANT CHANGE UP (ref 0–2)
BILIRUB UR-MCNC: ABNORMAL
BUN SERPL-MCNC: 37 MG/DL — HIGH (ref 8–20)
BUN SERPL-MCNC: 41 MG/DL — HIGH (ref 8–20)
BUN SERPL-MCNC: 47 MG/DL — HIGH (ref 8–20)
CALCIUM SERPL-MCNC: 8.7 MG/DL — SIGNIFICANT CHANGE UP (ref 8.6–10.2)
CALCIUM SERPL-MCNC: 9 MG/DL — SIGNIFICANT CHANGE UP (ref 8.6–10.2)
CALCIUM SERPL-MCNC: 9.2 MG/DL — SIGNIFICANT CHANGE UP (ref 8.6–10.2)
CHLORIDE SERPL-SCNC: 101 MMOL/L — SIGNIFICANT CHANGE UP (ref 98–107)
CHLORIDE SERPL-SCNC: 102 MMOL/L — SIGNIFICANT CHANGE UP (ref 98–107)
CHLORIDE SERPL-SCNC: 104 MMOL/L — SIGNIFICANT CHANGE UP (ref 98–107)
CO2 SERPL-SCNC: 16 MMOL/L — LOW (ref 22–29)
CO2 SERPL-SCNC: 18 MMOL/L — LOW (ref 22–29)
CO2 SERPL-SCNC: 19 MMOL/L — LOW (ref 22–29)
COLOR SPEC: ABNORMAL
CREAT SERPL-MCNC: 1.17 MG/DL — SIGNIFICANT CHANGE UP (ref 0.5–1.3)
CREAT SERPL-MCNC: 1.24 MG/DL — SIGNIFICANT CHANGE UP (ref 0.5–1.3)
CREAT SERPL-MCNC: 1.49 MG/DL — HIGH (ref 0.5–1.3)
DIFF PNL FLD: ABNORMAL
EOSINOPHIL # BLD AUTO: 0.09 K/UL — SIGNIFICANT CHANGE UP (ref 0–0.5)
EOSINOPHIL NFR BLD AUTO: 1 % — SIGNIFICANT CHANGE UP (ref 0–6)
EPI CELLS # UR: SIGNIFICANT CHANGE UP
ESTIMATED AVERAGE GLUCOSE: 111 MG/DL — SIGNIFICANT CHANGE UP (ref 68–114)
GLUCOSE BLDC GLUCOMTR-MCNC: 165 MG/DL — HIGH (ref 70–99)
GLUCOSE BLDC GLUCOMTR-MCNC: 36 MG/DL — CRITICAL LOW (ref 70–99)
GLUCOSE BLDC GLUCOMTR-MCNC: 37 MG/DL — CRITICAL LOW (ref 70–99)
GLUCOSE BLDC GLUCOMTR-MCNC: 51 MG/DL — LOW (ref 70–99)
GLUCOSE BLDC GLUCOMTR-MCNC: 61 MG/DL — LOW (ref 70–99)
GLUCOSE BLDC GLUCOMTR-MCNC: 63 MG/DL — LOW (ref 70–99)
GLUCOSE BLDC GLUCOMTR-MCNC: 66 MG/DL — LOW (ref 70–99)
GLUCOSE BLDC GLUCOMTR-MCNC: 70 MG/DL — SIGNIFICANT CHANGE UP (ref 70–99)
GLUCOSE BLDC GLUCOMTR-MCNC: 75 MG/DL — SIGNIFICANT CHANGE UP (ref 70–99)
GLUCOSE BLDC GLUCOMTR-MCNC: 83 MG/DL — SIGNIFICANT CHANGE UP (ref 70–99)
GLUCOSE BLDC GLUCOMTR-MCNC: 97 MG/DL — SIGNIFICANT CHANGE UP (ref 70–99)
GLUCOSE BLDC GLUCOMTR-MCNC: <30 MG/DL — CRITICAL LOW (ref 70–99)
GLUCOSE SERPL-MCNC: 100 MG/DL — HIGH (ref 70–99)
GLUCOSE SERPL-MCNC: 162 MG/DL — HIGH (ref 70–99)
GLUCOSE SERPL-MCNC: 94 MG/DL — SIGNIFICANT CHANGE UP (ref 70–99)
GLUCOSE UR QL: NEGATIVE — SIGNIFICANT CHANGE UP
HCT VFR BLD CALC: 39.6 % — SIGNIFICANT CHANGE UP (ref 34.5–45)
HCT VFR BLD CALC: 42.3 % — SIGNIFICANT CHANGE UP (ref 34.5–45)
HCT VFR BLD CALC: 43.3 % — SIGNIFICANT CHANGE UP (ref 34.5–45)
HCV AB S/CO SERPL IA: 0.1 S/CO — SIGNIFICANT CHANGE UP (ref 0–0.99)
HCV AB SERPL-IMP: SIGNIFICANT CHANGE UP
HGB BLD-MCNC: 12.6 G/DL — SIGNIFICANT CHANGE UP (ref 11.5–15.5)
HGB BLD-MCNC: 13.3 G/DL — SIGNIFICANT CHANGE UP (ref 11.5–15.5)
HGB BLD-MCNC: 13.8 G/DL — SIGNIFICANT CHANGE UP (ref 11.5–15.5)
IMM GRANULOCYTES NFR BLD AUTO: 0.4 % — SIGNIFICANT CHANGE UP (ref 0–1.5)
INR BLD: 1.48 RATIO — HIGH (ref 0.88–1.16)
KETONES UR-MCNC: ABNORMAL
LACTATE BLDV-MCNC: 1.6 MMOL/L — SIGNIFICANT CHANGE UP (ref 0.5–2)
LEUKOCYTE ESTERASE UR-ACNC: ABNORMAL
LYMPHOCYTES # BLD AUTO: 0.69 K/UL — LOW (ref 1–3.3)
LYMPHOCYTES # BLD AUTO: 7.6 % — LOW (ref 13–44)
MAGNESIUM SERPL-MCNC: 1.4 MG/DL — LOW (ref 1.6–2.6)
MAGNESIUM SERPL-MCNC: 1.6 MG/DL — SIGNIFICANT CHANGE UP (ref 1.6–2.6)
MCHC RBC-ENTMCNC: 26.7 PG — LOW (ref 27–34)
MCHC RBC-ENTMCNC: 27 PG — SIGNIFICANT CHANGE UP (ref 27–34)
MCHC RBC-ENTMCNC: 27.4 PG — SIGNIFICANT CHANGE UP (ref 27–34)
MCHC RBC-ENTMCNC: 31.4 GM/DL — LOW (ref 32–36)
MCHC RBC-ENTMCNC: 31.8 GM/DL — LOW (ref 32–36)
MCHC RBC-ENTMCNC: 31.9 GM/DL — LOW (ref 32–36)
MCV RBC AUTO: 84.9 FL — SIGNIFICANT CHANGE UP (ref 80–100)
MCV RBC AUTO: 85 FL — SIGNIFICANT CHANGE UP (ref 80–100)
MCV RBC AUTO: 86.1 FL — SIGNIFICANT CHANGE UP (ref 80–100)
MONOCYTES # BLD AUTO: 0.38 K/UL — SIGNIFICANT CHANGE UP (ref 0–0.9)
MONOCYTES NFR BLD AUTO: 4.2 % — SIGNIFICANT CHANGE UP (ref 2–14)
NEUTROPHILS # BLD AUTO: 7.91 K/UL — HIGH (ref 1.8–7.4)
NEUTROPHILS NFR BLD AUTO: 86.6 % — HIGH (ref 43–77)
NITRITE UR-MCNC: POSITIVE
PH UR: 5 — SIGNIFICANT CHANGE UP (ref 5–8)
PHOSPHATE SERPL-MCNC: 2.5 MG/DL — SIGNIFICANT CHANGE UP (ref 2.4–4.7)
PHOSPHATE SERPL-MCNC: 2.8 MG/DL — SIGNIFICANT CHANGE UP (ref 2.4–4.7)
PLATELET # BLD AUTO: 382 K/UL — SIGNIFICANT CHANGE UP (ref 150–400)
PLATELET # BLD AUTO: 387 K/UL — SIGNIFICANT CHANGE UP (ref 150–400)
PLATELET # BLD AUTO: 392 K/UL — SIGNIFICANT CHANGE UP (ref 150–400)
POTASSIUM SERPL-MCNC: 3.7 MMOL/L — SIGNIFICANT CHANGE UP (ref 3.5–5.3)
POTASSIUM SERPL-MCNC: 4.2 MMOL/L — SIGNIFICANT CHANGE UP (ref 3.5–5.3)
POTASSIUM SERPL-MCNC: 4.5 MMOL/L — SIGNIFICANT CHANGE UP (ref 3.5–5.3)
POTASSIUM SERPL-SCNC: 3.7 MMOL/L — SIGNIFICANT CHANGE UP (ref 3.5–5.3)
POTASSIUM SERPL-SCNC: 4.2 MMOL/L — SIGNIFICANT CHANGE UP (ref 3.5–5.3)
POTASSIUM SERPL-SCNC: 4.5 MMOL/L — SIGNIFICANT CHANGE UP (ref 3.5–5.3)
PROT UR-MCNC: 100
PROTHROM AB SERPL-ACNC: 16.8 SEC — HIGH (ref 10.6–13.6)
RBC # BLD: 4.66 M/UL — SIGNIFICANT CHANGE UP (ref 3.8–5.2)
RBC # BLD: 4.98 M/UL — SIGNIFICANT CHANGE UP (ref 3.8–5.2)
RBC # BLD: 5.03 M/UL — SIGNIFICANT CHANGE UP (ref 3.8–5.2)
RBC # FLD: 20.9 % — HIGH (ref 10.3–14.5)
RBC # FLD: 21 % — HIGH (ref 10.3–14.5)
RBC # FLD: 21.1 % — HIGH (ref 10.3–14.5)
RBC CASTS # UR COMP ASSIST: >50 /HPF (ref 0–4)
SODIUM SERPL-SCNC: 134 MMOL/L — LOW (ref 135–145)
SODIUM SERPL-SCNC: 135 MMOL/L — SIGNIFICANT CHANGE UP (ref 135–145)
SODIUM SERPL-SCNC: 138 MMOL/L — SIGNIFICANT CHANGE UP (ref 135–145)
SP GR SPEC: 1.01 — SIGNIFICANT CHANGE UP (ref 1.01–1.02)
UROBILINOGEN FLD QL: 1
WBC # BLD: 7.36 K/UL — SIGNIFICANT CHANGE UP (ref 3.8–10.5)
WBC # BLD: 9 K/UL — SIGNIFICANT CHANGE UP (ref 3.8–10.5)
WBC # BLD: 9.13 K/UL — SIGNIFICANT CHANGE UP (ref 3.8–10.5)
WBC # FLD AUTO: 7.36 K/UL — SIGNIFICANT CHANGE UP (ref 3.8–10.5)
WBC # FLD AUTO: 9 K/UL — SIGNIFICANT CHANGE UP (ref 3.8–10.5)
WBC # FLD AUTO: 9.13 K/UL — SIGNIFICANT CHANGE UP (ref 3.8–10.5)
WBC UR QL: ABNORMAL

## 2020-07-30 PROCEDURE — 71045 X-RAY EXAM CHEST 1 VIEW: CPT | Mod: 26

## 2020-07-30 PROCEDURE — 99231 SBSQ HOSP IP/OBS SF/LOW 25: CPT

## 2020-07-30 RX ORDER — TRAMADOL HYDROCHLORIDE 50 MG/1
25 TABLET ORAL EVERY 4 HOURS
Refills: 0 | Status: DISCONTINUED | OUTPATIENT
Start: 2020-07-30 | End: 2020-08-05

## 2020-07-30 RX ORDER — DEXTROSE 50 % IN WATER 50 %
15 SYRINGE (ML) INTRAVENOUS ONCE
Refills: 0 | Status: COMPLETED | OUTPATIENT
Start: 2020-07-30 | End: 2020-07-30

## 2020-07-30 RX ORDER — HYDROCHLOROTHIAZIDE 25 MG
25 TABLET ORAL DAILY
Refills: 0 | Status: DISCONTINUED | OUTPATIENT
Start: 2020-07-30 | End: 2020-08-05

## 2020-07-30 RX ORDER — CEFTRIAXONE 500 MG/1
INJECTION, POWDER, FOR SOLUTION INTRAMUSCULAR; INTRAVENOUS
Refills: 0 | Status: DISCONTINUED | OUTPATIENT
Start: 2020-07-30 | End: 2020-08-02

## 2020-07-30 RX ORDER — SODIUM CHLORIDE 9 MG/ML
1000 INJECTION, SOLUTION INTRAVENOUS
Refills: 0 | Status: DISCONTINUED | OUTPATIENT
Start: 2020-07-30 | End: 2020-07-30

## 2020-07-30 RX ORDER — ENOXAPARIN SODIUM 100 MG/ML
40 INJECTION SUBCUTANEOUS DAILY
Refills: 0 | Status: DISCONTINUED | OUTPATIENT
Start: 2020-07-30 | End: 2020-08-04

## 2020-07-30 RX ORDER — CEFTRIAXONE 500 MG/1
1000 INJECTION, POWDER, FOR SOLUTION INTRAMUSCULAR; INTRAVENOUS ONCE
Refills: 0 | Status: COMPLETED | OUTPATIENT
Start: 2020-07-30 | End: 2020-07-30

## 2020-07-30 RX ORDER — CEFTRIAXONE 500 MG/1
1000 INJECTION, POWDER, FOR SOLUTION INTRAMUSCULAR; INTRAVENOUS EVERY 24 HOURS
Refills: 0 | Status: DISCONTINUED | OUTPATIENT
Start: 2020-07-31 | End: 2020-08-02

## 2020-07-30 RX ORDER — TRAMADOL HYDROCHLORIDE 50 MG/1
50 TABLET ORAL EVERY 4 HOURS
Refills: 0 | Status: DISCONTINUED | OUTPATIENT
Start: 2020-07-30 | End: 2020-08-05

## 2020-07-30 RX ORDER — LOSARTAN POTASSIUM 100 MG/1
100 TABLET, FILM COATED ORAL DAILY
Refills: 0 | Status: DISCONTINUED | OUTPATIENT
Start: 2020-07-30 | End: 2020-08-05

## 2020-07-30 RX ORDER — SODIUM CHLORIDE 9 MG/ML
1000 INJECTION, SOLUTION INTRAVENOUS
Refills: 0 | Status: DISCONTINUED | OUTPATIENT
Start: 2020-07-30 | End: 2020-08-04

## 2020-07-30 RX ORDER — ACETAMINOPHEN 500 MG
1000 TABLET ORAL ONCE
Refills: 0 | Status: COMPLETED | OUTPATIENT
Start: 2020-07-30 | End: 2020-07-30

## 2020-07-30 RX ORDER — SODIUM CHLORIDE 9 MG/ML
500 INJECTION, SOLUTION INTRAVENOUS ONCE
Refills: 0 | Status: COMPLETED | OUTPATIENT
Start: 2020-07-30 | End: 2020-07-30

## 2020-07-30 RX ORDER — KETOROLAC TROMETHAMINE 30 MG/ML
30 SYRINGE (ML) INJECTION ONCE
Refills: 0 | Status: DISCONTINUED | OUTPATIENT
Start: 2020-07-30 | End: 2020-07-30

## 2020-07-30 RX ADMIN — Medication 1000 MILLIGRAM(S): at 23:15

## 2020-07-30 RX ADMIN — NORTRIPTYLINE HYDROCHLORIDE 10 MILLIGRAM(S): 10 CAPSULE ORAL at 12:09

## 2020-07-30 RX ADMIN — Medication 180 MILLIGRAM(S): at 06:00

## 2020-07-30 RX ADMIN — TRAMADOL HYDROCHLORIDE 50 MILLIGRAM(S): 50 TABLET ORAL at 02:48

## 2020-07-30 RX ADMIN — Medication 25 MILLIGRAM(S): at 06:00

## 2020-07-30 RX ADMIN — Medication 50 MILLIGRAM(S): at 06:00

## 2020-07-30 RX ADMIN — ATORVASTATIN CALCIUM 80 MILLIGRAM(S): 80 TABLET, FILM COATED ORAL at 23:13

## 2020-07-30 RX ADMIN — TRAMADOL HYDROCHLORIDE 50 MILLIGRAM(S): 50 TABLET ORAL at 09:20

## 2020-07-30 RX ADMIN — Medication 30 MILLIGRAM(S): at 12:09

## 2020-07-30 RX ADMIN — TRAMADOL HYDROCHLORIDE 50 MILLIGRAM(S): 50 TABLET ORAL at 08:20

## 2020-07-30 RX ADMIN — Medication 400 MILLIGRAM(S): at 22:30

## 2020-07-30 RX ADMIN — Medication 650 MILLIGRAM(S): at 00:56

## 2020-07-30 RX ADMIN — TRAMADOL HYDROCHLORIDE 50 MILLIGRAM(S): 50 TABLET ORAL at 17:52

## 2020-07-30 RX ADMIN — Medication 30 MILLIGRAM(S): at 13:39

## 2020-07-30 RX ADMIN — SODIUM CHLORIDE 50 MILLILITER(S): 9 INJECTION, SOLUTION INTRAVENOUS at 02:16

## 2020-07-30 RX ADMIN — ENOXAPARIN SODIUM 40 MILLIGRAM(S): 100 INJECTION SUBCUTANEOUS at 17:52

## 2020-07-30 RX ADMIN — Medication 15 GRAM(S): at 00:55

## 2020-07-30 RX ADMIN — TRAMADOL HYDROCHLORIDE 50 MILLIGRAM(S): 50 TABLET ORAL at 18:19

## 2020-07-30 RX ADMIN — LOSARTAN POTASSIUM 100 MILLIGRAM(S): 100 TABLET, FILM COATED ORAL at 06:00

## 2020-07-30 RX ADMIN — Medication 15 GRAM(S): at 00:14

## 2020-07-30 RX ADMIN — Medication 650 MILLIGRAM(S): at 01:55

## 2020-07-30 RX ADMIN — SODIUM CHLORIDE 1000 MILLILITER(S): 9 INJECTION, SOLUTION INTRAVENOUS at 22:00

## 2020-07-30 RX ADMIN — TRAMADOL HYDROCHLORIDE 50 MILLIGRAM(S): 50 TABLET ORAL at 03:45

## 2020-07-30 NOTE — PROGRESS NOTE ADULT - ASSESSMENT
Patient is a 69y old female with lower back hematoma 2/2 to mechanical fall 5 days ago with decreasing H/H. Patient also found to be hypotensive in the ED, asymptomatic. Hg 8.3 on admission got 2 U PRBC, responded to 11. 7, 2L bolus, and Kayexalate in the ER. Today, per nursing with F/S of 60's, no medication given. Hg today 13.8      - Trend H/H  - Monitor back hematoma for expansion  - regular, CarbCons diet with glucerna  - pain control  - Continue BP medications  - strict I/Os, with morales Patient is a 69y old female with lower back hematoma 2/2 to mechanical fall 5 days ago with decreasing H/H. Patient also found to be hypotensive in the ED, asymptomatic. Hg 8.3 on admission got 2 U PRBC, responded to 11. 7, 2L bolus, and Kayexalate in the ER. Today, per nursing with F/S of 60's, no medication given. Hg today 13.8      - Trend H/H  - Monitor back hematoma for expansion  - regular, CarbCons diet with glucerna  - pain control  - Continue BP medications  - strict I/Os, with nielsen  - PT/OT Patient is a 69y old female with lower back hematoma 2/2 to mechanical fall 5 days ago with decreasing H/H. Patient also found to be hypotensive in the ED, asymptomatic. Hg 8.3 on admission got 2 U PRBC, responded to 11. 7, 2L bolus, and Kayexalate in the ER. Today, per nursing with F/S of 60's, no medication given. Hg today 13.8    - AM Labs pending  - Trend H/H  - Monitor back hematoma for expansion  - regular, CarbCons diet with glucerna  - pain control  - Continue BP medications  - strict I/Os, with nielsen  - PT/OT

## 2020-07-30 NOTE — CHART NOTE - NSCHARTNOTEFT_GEN_A_CORE
CODE SEPSIS    Code sepsis peña dfor temp of 102.3 rectally, , /74, RR 18, O2 100%. Upon evaluation at the bedside, patient is found in no acute distress, upon connecting cardiac monitor, patient fluctuated between HR of mid 90's to low 100's. AAOx3, only complaint was of mild back pain that has been improving since admission.     Vital Signs Last 24 Hrs  T(C): 36.9 (2020 00:57), Max: 39.1 (2020 21:21)  T(F): 98.4 (2020 00:57), Max: 102.3 (2020 21:21)  HR: 89 (2020 00:57) (59 - 105)  BP: 108/68 (2020 00:57) (99/61 - 133/66)  BP(mean): --  RR: 18 (2020 00:57) (16 - 20)  SpO2: 98% (2020 00:57) (80% - 100%)    I&O's Detail    2020 07:01  -  2020 07:00  --------------------------------------------------------  IN:    dextrose 5%.: 350 mL    Oral Fluid: 120 mL  Total IN: 470 mL    OUT:    Indwelling Catheter - Urethral: 1500 mL  Total OUT: 1500 mL    Total NET: -1030 mL      2020 07:01  -  2020 01:40  --------------------------------------------------------  IN:  Total IN: 0 mL    OUT:    Indwelling Catheter - Urethral: 650 mL  Total OUT: 650 mL    Total NET: -650 mL              Constitutional: patient resting comfortably in bed, in no acute distress  Respiratory: respirations are unlabored, no conversational dyspnea  Cardiovascular: regular rate & rhythm   Gastrointestinal: Abdomen soft, non-tender, non-distended, no rebound tenderness / guarding  Back: over lumbar area b/l paraspinal ecchymosses, mildly tender      LABS:                        12.6   9.00  )-----------( 382      ( 2020 22:05 )             39.6     07-30    135  |  102  |  37.0<H>  ----------------------------<  94  4.2   |  16.0<L>  |  1.17    Ca    9.0      2020 22:05  Phos  2.5     07-30  Mg     1.4     0730    TPro  7.0  /  Alb  2.9<L>  /  TBili  0.4  /  DBili  x   /  AST  30  /  ALT  13  /  AlkPhos  91  07-29    PT/INR - ( 2020 09:48 )   PT: 16.8 sec;   INR: 1.48 ratio         PTT - ( 2020 09:48 )  PTT:35.9 sec  Urinalysis Basic - ( 2020 22:12 )    Color: Brown / Appearance: Cloudy / S.015 / pH: x  Gluc: x / Ketone: Trace  / Bili: Small / Urobili: 1   Blood: x / Protein: 100 / Nitrite: Positive   Leuk Esterase: Moderate / RBC: >50 /HPF / WBC 11-25   Sq Epi: x / Non Sq Epi: Occasional / Bacteria: Moderate        MEDICATIONS  (STANDING):  atorvastatin 80 milliGRAM(s) Oral at bedtime  cefTRIAXone   IVPB 1000 milliGRAM(s) IV Intermittent every 24 hours  cefTRIAXone   IVPB      dextrose 5% + lactated ringers. 1000 milliLiter(s) (84 mL/Hr) IV Continuous <Continuous>  dextrose 5%. 1000 milliLiter(s) (50 mL/Hr) IV Continuous <Continuous>  dextrose 50% Injectable 12.5 Gram(s) IV Push once  dextrose 50% Injectable 25 Gram(s) IV Push once  dextrose 50% Injectable 25 Gram(s) IV Push once  diltiazem    milliGRAM(s) Oral daily  enoxaparin Injectable 40 milliGRAM(s) SubCutaneous daily  hydrochlorothiazide 25 milliGRAM(s) Oral daily  insulin lispro (HumaLOG) corrective regimen sliding scale   SubCutaneous three times a day before meals  losartan 100 milliGRAM(s) Oral daily  metoprolol tartrate 50 milliGRAM(s) Oral daily  nortriptyline 10 milliGRAM(s) Oral daily    MEDICATIONS  (PRN):  acetaminophen   Tablet .. 650 milliGRAM(s) Oral every 6 hours PRN Mild Pain (1 - 3)  dextrose 40% Gel 15 Gram(s) Oral once PRN Blood Glucose LESS THAN 70 milliGRAM(s)/deciliter  glucagon  Injectable 1 milliGRAM(s) IntraMuscular once PRN Glucose LESS THAN 70 milligrams/deciliter  traMADol 25 milliGRAM(s) Oral every 4 hours PRN Moderate Pain (4 - 6)  traMADol 50 milliGRAM(s) Oral every 4 hours PRN Severe Pain (7 - 10)        Patient is a 69y old female with lower back hematoma 2/2 to mechanical fall 5 days ago with decreasing H/H. Patient also found to be hypotensive in the ED, asymptomatic. Hg 8.3 on admission got 2 U PRBC, responded to 11. 7, 2L bolus, and Kayexalate in the ER. Today, per nursing with F/S of 60's, no medication given. Hg today 13.8    - Ordered full set of labs including lactate  - CXR, EKG  - Given 500mL bolus   - Placed D5LR at 84mL due to pt being hypoglycemic on POC fingerstick to 64  - Blood cultures obtained

## 2020-07-31 LAB
ANION GAP SERPL CALC-SCNC: 17 MMOL/L — SIGNIFICANT CHANGE UP (ref 5–17)
BASOPHILS # BLD AUTO: 0.01 K/UL — SIGNIFICANT CHANGE UP (ref 0–0.2)
BASOPHILS NFR BLD AUTO: 0.1 % — SIGNIFICANT CHANGE UP (ref 0–2)
BUN SERPL-MCNC: 35 MG/DL — HIGH (ref 8–20)
CALCIUM SERPL-MCNC: 8.9 MG/DL — SIGNIFICANT CHANGE UP (ref 8.6–10.2)
CHLORIDE SERPL-SCNC: 103 MMOL/L — SIGNIFICANT CHANGE UP (ref 98–107)
CO2 SERPL-SCNC: 19 MMOL/L — LOW (ref 22–29)
CREAT SERPL-MCNC: 1.24 MG/DL — SIGNIFICANT CHANGE UP (ref 0.5–1.3)
EOSINOPHIL # BLD AUTO: 0.01 K/UL — SIGNIFICANT CHANGE UP (ref 0–0.5)
EOSINOPHIL NFR BLD AUTO: 0.1 % — SIGNIFICANT CHANGE UP (ref 0–6)
GLUCOSE BLDC GLUCOMTR-MCNC: 100 MG/DL — HIGH (ref 70–99)
GLUCOSE BLDC GLUCOMTR-MCNC: 113 MG/DL — HIGH (ref 70–99)
GLUCOSE BLDC GLUCOMTR-MCNC: 131 MG/DL — HIGH (ref 70–99)
GLUCOSE BLDC GLUCOMTR-MCNC: 83 MG/DL — SIGNIFICANT CHANGE UP (ref 70–99)
GLUCOSE SERPL-MCNC: 94 MG/DL — SIGNIFICANT CHANGE UP (ref 70–99)
HCT VFR BLD CALC: 41 % — SIGNIFICANT CHANGE UP (ref 34.5–45)
HGB BLD-MCNC: 13 G/DL — SIGNIFICANT CHANGE UP (ref 11.5–15.5)
IMM GRANULOCYTES NFR BLD AUTO: 0.6 % — SIGNIFICANT CHANGE UP (ref 0–1.5)
LYMPHOCYTES # BLD AUTO: 0.72 K/UL — LOW (ref 1–3.3)
LYMPHOCYTES # BLD AUTO: 10.6 % — LOW (ref 13–44)
MAGNESIUM SERPL-MCNC: 1.4 MG/DL — LOW (ref 1.6–2.6)
MCHC RBC-ENTMCNC: 26.9 PG — LOW (ref 27–34)
MCHC RBC-ENTMCNC: 31.7 GM/DL — LOW (ref 32–36)
MCV RBC AUTO: 84.7 FL — SIGNIFICANT CHANGE UP (ref 80–100)
MONOCYTES # BLD AUTO: 0.28 K/UL — SIGNIFICANT CHANGE UP (ref 0–0.9)
MONOCYTES NFR BLD AUTO: 4.1 % — SIGNIFICANT CHANGE UP (ref 2–14)
NEUTROPHILS # BLD AUTO: 5.76 K/UL — SIGNIFICANT CHANGE UP (ref 1.8–7.4)
NEUTROPHILS NFR BLD AUTO: 84.5 % — HIGH (ref 43–77)
PHOSPHATE SERPL-MCNC: 2.8 MG/DL — SIGNIFICANT CHANGE UP (ref 2.4–4.7)
PLATELET # BLD AUTO: 343 K/UL — SIGNIFICANT CHANGE UP (ref 150–400)
POTASSIUM SERPL-MCNC: 4 MMOL/L — SIGNIFICANT CHANGE UP (ref 3.5–5.3)
POTASSIUM SERPL-SCNC: 4 MMOL/L — SIGNIFICANT CHANGE UP (ref 3.5–5.3)
RBC # BLD: 4.84 M/UL — SIGNIFICANT CHANGE UP (ref 3.8–5.2)
RBC # FLD: 21.2 % — HIGH (ref 10.3–14.5)
SARS-COV-2 IGG SERPL QL IA: NEGATIVE — SIGNIFICANT CHANGE UP
SARS-COV-2 IGM SERPL IA-ACNC: <0.1 INDEX — SIGNIFICANT CHANGE UP
SODIUM SERPL-SCNC: 139 MMOL/L — SIGNIFICANT CHANGE UP (ref 135–145)
WBC # BLD: 6.82 K/UL — SIGNIFICANT CHANGE UP (ref 3.8–10.5)
WBC # FLD AUTO: 6.82 K/UL — SIGNIFICANT CHANGE UP (ref 3.8–10.5)

## 2020-07-31 PROCEDURE — 99232 SBSQ HOSP IP/OBS MODERATE 35: CPT

## 2020-07-31 RX ORDER — MAGNESIUM SULFATE 500 MG/ML
2 VIAL (ML) INJECTION
Refills: 0 | Status: COMPLETED | OUTPATIENT
Start: 2020-07-31 | End: 2020-07-31

## 2020-07-31 RX ADMIN — CEFTRIAXONE 100 MILLIGRAM(S): 500 INJECTION, POWDER, FOR SOLUTION INTRAMUSCULAR; INTRAVENOUS at 22:46

## 2020-07-31 RX ADMIN — TRAMADOL HYDROCHLORIDE 50 MILLIGRAM(S): 50 TABLET ORAL at 22:46

## 2020-07-31 RX ADMIN — CEFTRIAXONE 100 MILLIGRAM(S): 500 INJECTION, POWDER, FOR SOLUTION INTRAMUSCULAR; INTRAVENOUS at 00:21

## 2020-07-31 RX ADMIN — NORTRIPTYLINE HYDROCHLORIDE 10 MILLIGRAM(S): 10 CAPSULE ORAL at 12:42

## 2020-07-31 RX ADMIN — ATORVASTATIN CALCIUM 80 MILLIGRAM(S): 80 TABLET, FILM COATED ORAL at 22:46

## 2020-07-31 RX ADMIN — ENOXAPARIN SODIUM 40 MILLIGRAM(S): 100 INJECTION SUBCUTANEOUS at 12:42

## 2020-07-31 RX ADMIN — Medication 25 MILLIGRAM(S): at 06:24

## 2020-07-31 RX ADMIN — Medication 180 MILLIGRAM(S): at 06:15

## 2020-07-31 RX ADMIN — TRAMADOL HYDROCHLORIDE 50 MILLIGRAM(S): 50 TABLET ORAL at 16:39

## 2020-07-31 RX ADMIN — SODIUM CHLORIDE 84 MILLILITER(S): 9 INJECTION, SOLUTION INTRAVENOUS at 00:21

## 2020-07-31 RX ADMIN — LOSARTAN POTASSIUM 100 MILLIGRAM(S): 100 TABLET, FILM COATED ORAL at 06:15

## 2020-07-31 RX ADMIN — Medication 50 GRAM(S): at 10:49

## 2020-07-31 RX ADMIN — TRAMADOL HYDROCHLORIDE 50 MILLIGRAM(S): 50 TABLET ORAL at 15:44

## 2020-07-31 RX ADMIN — Medication 50 GRAM(S): at 08:35

## 2020-07-31 RX ADMIN — TRAMADOL HYDROCHLORIDE 50 MILLIGRAM(S): 50 TABLET ORAL at 23:30

## 2020-07-31 RX ADMIN — Medication 50 MILLIGRAM(S): at 06:15

## 2020-07-31 NOTE — PROGRESS NOTE ADULT - ASSESSMENT
Patient is a 69y old female with lower back hematoma 2/2 to mechanical fall 5 days ago with decreasing H/H. Patient also found to be hypotensive in the ED, asymptomatic. Hg 8.3 on admission got 2 U PRBC, responded to 11. 7, 2L bolus, and Kayexalate in the ER. Today, per nursing with F/S of 60's, no medication given.     - U/A positive for UTI, started 1g of Ceftriaxone  - CXR with no acute changes from previous imaging  - Lab workup unremarkable from previous  - Placed U/S guided PIV on LUE

## 2020-07-31 NOTE — OCCUPATIONAL THERAPY INITIAL EVALUATION ADULT - ASSISTIVE DEVICE FOR TOILET TRANSFER, REHAB EVAL
GEN: (-) fever, (-) chills, (-) malaise  HEENT: (-) vision changes, (-) HA  CV: (+) chest pain, (-) palpitations, (-) edema  PULM: (-) cough, (-) wheezing, (+) SOB  GI: (-) abdominal pain,(-) Nausea, (+) Vomiting, (-) Diarrhea, (-) Melena  NEURO: (-) weakness, (-) paresthesias, (-) syncope, (-) lightheadedness, (-) dizziness  : (-) dysuria, (-) frequency, (-) urgency  MS: (-) back pain,(-)myalgias  SKIN: (-) rashes, (-) new lesions  HEME: (-) bleeding, (-) ecchymosis commode

## 2020-08-01 LAB
-  COAGULASE NEGATIVE STAPHYLOCOCCUS: SIGNIFICANT CHANGE UP
ALBUMIN SERPL ELPH-MCNC: 2.6 G/DL — LOW (ref 3.3–5.2)
ALP SERPL-CCNC: 94 U/L — SIGNIFICANT CHANGE UP (ref 40–120)
ALT FLD-CCNC: 12 U/L — SIGNIFICANT CHANGE UP
ANION GAP SERPL CALC-SCNC: 13 MMOL/L — SIGNIFICANT CHANGE UP (ref 5–17)
AST SERPL-CCNC: 16 U/L — SIGNIFICANT CHANGE UP
BILIRUB SERPL-MCNC: 0.4 MG/DL — SIGNIFICANT CHANGE UP (ref 0.4–2)
BUN SERPL-MCNC: 16 MG/DL — SIGNIFICANT CHANGE UP (ref 8–20)
CALCIUM SERPL-MCNC: 8.4 MG/DL — LOW (ref 8.6–10.2)
CHLORIDE SERPL-SCNC: 100 MMOL/L — SIGNIFICANT CHANGE UP (ref 98–107)
CO2 SERPL-SCNC: 22 MMOL/L — SIGNIFICANT CHANGE UP (ref 22–29)
CREAT SERPL-MCNC: 0.67 MG/DL — SIGNIFICANT CHANGE UP (ref 0.5–1.3)
CULTURE RESULTS: SIGNIFICANT CHANGE UP
GLUCOSE BLDC GLUCOMTR-MCNC: 107 MG/DL — HIGH (ref 70–99)
GLUCOSE BLDC GLUCOMTR-MCNC: 110 MG/DL — HIGH (ref 70–99)
GLUCOSE BLDC GLUCOMTR-MCNC: 119 MG/DL — HIGH (ref 70–99)
GLUCOSE BLDC GLUCOMTR-MCNC: 86 MG/DL — SIGNIFICANT CHANGE UP (ref 70–99)
GLUCOSE SERPL-MCNC: 115 MG/DL — HIGH (ref 70–99)
HCT VFR BLD CALC: 38.5 % — SIGNIFICANT CHANGE UP (ref 34.5–45)
HGB BLD-MCNC: 12.3 G/DL — SIGNIFICANT CHANGE UP (ref 11.5–15.5)
MAGNESIUM SERPL-MCNC: 1.8 MG/DL — SIGNIFICANT CHANGE UP (ref 1.6–2.6)
MCHC RBC-ENTMCNC: 26.7 PG — LOW (ref 27–34)
MCHC RBC-ENTMCNC: 31.9 GM/DL — LOW (ref 32–36)
MCV RBC AUTO: 83.7 FL — SIGNIFICANT CHANGE UP (ref 80–100)
METHOD TYPE: SIGNIFICANT CHANGE UP
PHOSPHATE SERPL-MCNC: 2.7 MG/DL — SIGNIFICANT CHANGE UP (ref 2.4–4.7)
PLATELET # BLD AUTO: 337 K/UL — SIGNIFICANT CHANGE UP (ref 150–400)
POTASSIUM SERPL-MCNC: 2.9 MMOL/L — CRITICAL LOW (ref 3.5–5.3)
POTASSIUM SERPL-SCNC: 2.9 MMOL/L — CRITICAL LOW (ref 3.5–5.3)
PROT SERPL-MCNC: 5.9 G/DL — LOW (ref 6.6–8.7)
RBC # BLD: 4.6 M/UL — SIGNIFICANT CHANGE UP (ref 3.8–5.2)
RBC # FLD: 20.9 % — HIGH (ref 10.3–14.5)
SODIUM SERPL-SCNC: 135 MMOL/L — SIGNIFICANT CHANGE UP (ref 135–145)
SPECIMEN SOURCE: SIGNIFICANT CHANGE UP
WBC # BLD: 5.37 K/UL — SIGNIFICANT CHANGE UP (ref 3.8–10.5)
WBC # FLD AUTO: 5.37 K/UL — SIGNIFICANT CHANGE UP (ref 3.8–10.5)

## 2020-08-01 PROCEDURE — 99232 SBSQ HOSP IP/OBS MODERATE 35: CPT

## 2020-08-01 RX ORDER — MAGNESIUM SULFATE 500 MG/ML
2 VIAL (ML) INJECTION ONCE
Refills: 0 | Status: COMPLETED | OUTPATIENT
Start: 2020-08-01 | End: 2020-08-01

## 2020-08-01 RX ORDER — POTASSIUM CHLORIDE 20 MEQ
40 PACKET (EA) ORAL EVERY 4 HOURS
Refills: 0 | Status: COMPLETED | OUTPATIENT
Start: 2020-08-01 | End: 2020-08-01

## 2020-08-01 RX ADMIN — TRAMADOL HYDROCHLORIDE 50 MILLIGRAM(S): 50 TABLET ORAL at 18:46

## 2020-08-01 RX ADMIN — NORTRIPTYLINE HYDROCHLORIDE 10 MILLIGRAM(S): 10 CAPSULE ORAL at 12:58

## 2020-08-01 RX ADMIN — SODIUM CHLORIDE 84 MILLILITER(S): 9 INJECTION, SOLUTION INTRAVENOUS at 23:03

## 2020-08-01 RX ADMIN — Medication 40 MILLIEQUIVALENT(S): at 18:08

## 2020-08-01 RX ADMIN — ATORVASTATIN CALCIUM 80 MILLIGRAM(S): 80 TABLET, FILM COATED ORAL at 23:02

## 2020-08-01 RX ADMIN — ENOXAPARIN SODIUM 40 MILLIGRAM(S): 100 INJECTION SUBCUTANEOUS at 12:58

## 2020-08-01 RX ADMIN — Medication 50 GRAM(S): at 18:08

## 2020-08-01 RX ADMIN — Medication 40 MILLIEQUIVALENT(S): at 23:02

## 2020-08-01 RX ADMIN — CEFTRIAXONE 100 MILLIGRAM(S): 500 INJECTION, POWDER, FOR SOLUTION INTRAMUSCULAR; INTRAVENOUS at 23:02

## 2020-08-01 RX ADMIN — TRAMADOL HYDROCHLORIDE 50 MILLIGRAM(S): 50 TABLET ORAL at 18:08

## 2020-08-01 RX ADMIN — TRAMADOL HYDROCHLORIDE 50 MILLIGRAM(S): 50 TABLET ORAL at 09:07

## 2020-08-01 RX ADMIN — SODIUM CHLORIDE 84 MILLILITER(S): 9 INJECTION, SOLUTION INTRAVENOUS at 12:56

## 2020-08-01 RX ADMIN — TRAMADOL HYDROCHLORIDE 50 MILLIGRAM(S): 50 TABLET ORAL at 14:15

## 2020-08-01 RX ADMIN — TRAMADOL HYDROCHLORIDE 50 MILLIGRAM(S): 50 TABLET ORAL at 23:02

## 2020-08-01 RX ADMIN — TRAMADOL HYDROCHLORIDE 50 MILLIGRAM(S): 50 TABLET ORAL at 13:20

## 2020-08-01 RX ADMIN — TRAMADOL HYDROCHLORIDE 50 MILLIGRAM(S): 50 TABLET ORAL at 10:02

## 2020-08-01 NOTE — PROGRESS NOTE ADULT - ASSESSMENT
Patient is a 69y old female with lower back hematoma 2/2 to mechanical fall 5 days ago with decreasing H/H. Patient also found to be hypotensive in the ED, asymptomatic. Hg 8.3 on admission got 2 U PRBC, responded to 11. 7, 2L bolus, and Kayexalate in the ER. Hgb now remains stable and no physical exam findings of active bleeding    - U/A positive for UTI, started 1g of Ceftriaxone, follow up cultures  - continue to trend hgb  - PT: cleared for home when medically stable

## 2020-08-02 LAB
-  AMPICILLIN/SULBACTAM: SIGNIFICANT CHANGE UP
-  CEFAZOLIN: SIGNIFICANT CHANGE UP
-  CLINDAMYCIN: SIGNIFICANT CHANGE UP
-  ERYTHROMYCIN: SIGNIFICANT CHANGE UP
-  GENTAMICIN: SIGNIFICANT CHANGE UP
-  OXACILLIN: SIGNIFICANT CHANGE UP
-  PENICILLIN: SIGNIFICANT CHANGE UP
-  RIFAMPIN: SIGNIFICANT CHANGE UP
-  TETRACYCLINE: SIGNIFICANT CHANGE UP
-  TRIMETHOPRIM/SULFAMETHOXAZOLE: SIGNIFICANT CHANGE UP
-  VANCOMYCIN: SIGNIFICANT CHANGE UP
ANION GAP SERPL CALC-SCNC: 11 MMOL/L — SIGNIFICANT CHANGE UP (ref 5–17)
BASOPHILS # BLD AUTO: 0.02 K/UL — SIGNIFICANT CHANGE UP (ref 0–0.2)
BASOPHILS NFR BLD AUTO: 0.3 % — SIGNIFICANT CHANGE UP (ref 0–2)
BUN SERPL-MCNC: 12 MG/DL — SIGNIFICANT CHANGE UP (ref 8–20)
CALCIUM SERPL-MCNC: 8.4 MG/DL — LOW (ref 8.6–10.2)
CHLORIDE SERPL-SCNC: 102 MMOL/L — SIGNIFICANT CHANGE UP (ref 98–107)
CO2 SERPL-SCNC: 24 MMOL/L — SIGNIFICANT CHANGE UP (ref 22–29)
CREAT SERPL-MCNC: 0.61 MG/DL — SIGNIFICANT CHANGE UP (ref 0.5–1.3)
EOSINOPHIL # BLD AUTO: 0.07 K/UL — SIGNIFICANT CHANGE UP (ref 0–0.5)
EOSINOPHIL NFR BLD AUTO: 1 % — SIGNIFICANT CHANGE UP (ref 0–6)
GLUCOSE BLDC GLUCOMTR-MCNC: 101 MG/DL — HIGH (ref 70–99)
GLUCOSE BLDC GLUCOMTR-MCNC: 106 MG/DL — HIGH (ref 70–99)
GLUCOSE BLDC GLUCOMTR-MCNC: 125 MG/DL — HIGH (ref 70–99)
GLUCOSE BLDC GLUCOMTR-MCNC: 125 MG/DL — HIGH (ref 70–99)
GLUCOSE SERPL-MCNC: 93 MG/DL — SIGNIFICANT CHANGE UP (ref 70–99)
HCT VFR BLD CALC: 40 % — SIGNIFICANT CHANGE UP (ref 34.5–45)
HGB BLD-MCNC: 12.3 G/DL — SIGNIFICANT CHANGE UP (ref 11.5–15.5)
IMM GRANULOCYTES NFR BLD AUTO: 0.6 % — SIGNIFICANT CHANGE UP (ref 0–1.5)
LYMPHOCYTES # BLD AUTO: 1.08 K/UL — SIGNIFICANT CHANGE UP (ref 1–3.3)
LYMPHOCYTES # BLD AUTO: 15.4 % — SIGNIFICANT CHANGE UP (ref 13–44)
MAGNESIUM SERPL-MCNC: 1.7 MG/DL — SIGNIFICANT CHANGE UP (ref 1.6–2.6)
MCHC RBC-ENTMCNC: 26.2 PG — LOW (ref 27–34)
MCHC RBC-ENTMCNC: 30.8 GM/DL — LOW (ref 32–36)
MCV RBC AUTO: 85.3 FL — SIGNIFICANT CHANGE UP (ref 80–100)
METHOD TYPE: SIGNIFICANT CHANGE UP
MONOCYTES # BLD AUTO: 0.49 K/UL — SIGNIFICANT CHANGE UP (ref 0–0.9)
MONOCYTES NFR BLD AUTO: 7 % — SIGNIFICANT CHANGE UP (ref 2–14)
NEUTROPHILS # BLD AUTO: 5.31 K/UL — SIGNIFICANT CHANGE UP (ref 1.8–7.4)
NEUTROPHILS NFR BLD AUTO: 75.7 % — SIGNIFICANT CHANGE UP (ref 43–77)
PHOSPHATE SERPL-MCNC: 2.1 MG/DL — LOW (ref 2.4–4.7)
PLATELET # BLD AUTO: 369 K/UL — SIGNIFICANT CHANGE UP (ref 150–400)
POTASSIUM SERPL-MCNC: 4.4 MMOL/L — SIGNIFICANT CHANGE UP (ref 3.5–5.3)
POTASSIUM SERPL-SCNC: 4.4 MMOL/L — SIGNIFICANT CHANGE UP (ref 3.5–5.3)
RBC # BLD: 4.69 M/UL — SIGNIFICANT CHANGE UP (ref 3.8–5.2)
RBC # FLD: 21.1 % — HIGH (ref 10.3–14.5)
SODIUM SERPL-SCNC: 137 MMOL/L — SIGNIFICANT CHANGE UP (ref 135–145)
WBC # BLD: 7.01 K/UL — SIGNIFICANT CHANGE UP (ref 3.8–10.5)
WBC # FLD AUTO: 7.01 K/UL — SIGNIFICANT CHANGE UP (ref 3.8–10.5)

## 2020-08-02 PROCEDURE — 99231 SBSQ HOSP IP/OBS SF/LOW 25: CPT

## 2020-08-02 RX ORDER — CEFAZOLIN SODIUM 1 G
VIAL (EA) INJECTION
Refills: 0 | Status: DISCONTINUED | OUTPATIENT
Start: 2020-08-02 | End: 2020-08-04

## 2020-08-02 RX ORDER — CEFAZOLIN SODIUM 1 G
2000 VIAL (EA) INJECTION EVERY 8 HOURS
Refills: 0 | Status: DISCONTINUED | OUTPATIENT
Start: 2020-08-02 | End: 2020-08-04

## 2020-08-02 RX ORDER — LOPERAMIDE HCL 2 MG
2 TABLET ORAL ONCE
Refills: 0 | Status: COMPLETED | OUTPATIENT
Start: 2020-08-02 | End: 2020-08-02

## 2020-08-02 RX ORDER — SODIUM,POTASSIUM PHOSPHATES 278-250MG
1 POWDER IN PACKET (EA) ORAL
Refills: 0 | Status: COMPLETED | OUTPATIENT
Start: 2020-08-02 | End: 2020-08-03

## 2020-08-02 RX ORDER — CEFAZOLIN SODIUM 1 G
2000 VIAL (EA) INJECTION ONCE
Refills: 0 | Status: COMPLETED | OUTPATIENT
Start: 2020-08-02 | End: 2020-08-02

## 2020-08-02 RX ADMIN — TRAMADOL HYDROCHLORIDE 50 MILLIGRAM(S): 50 TABLET ORAL at 18:27

## 2020-08-02 RX ADMIN — TRAMADOL HYDROCHLORIDE 50 MILLIGRAM(S): 50 TABLET ORAL at 06:21

## 2020-08-02 RX ADMIN — Medication 50 MILLIGRAM(S): at 06:21

## 2020-08-02 RX ADMIN — SODIUM CHLORIDE 84 MILLILITER(S): 9 INJECTION, SOLUTION INTRAVENOUS at 21:40

## 2020-08-02 RX ADMIN — TRAMADOL HYDROCHLORIDE 50 MILLIGRAM(S): 50 TABLET ORAL at 17:22

## 2020-08-02 RX ADMIN — TRAMADOL HYDROCHLORIDE 50 MILLIGRAM(S): 50 TABLET ORAL at 11:59

## 2020-08-02 RX ADMIN — NORTRIPTYLINE HYDROCHLORIDE 10 MILLIGRAM(S): 10 CAPSULE ORAL at 11:59

## 2020-08-02 RX ADMIN — ENOXAPARIN SODIUM 40 MILLIGRAM(S): 100 INJECTION SUBCUTANEOUS at 11:59

## 2020-08-02 RX ADMIN — Medication 180 MILLIGRAM(S): at 06:20

## 2020-08-02 RX ADMIN — ATORVASTATIN CALCIUM 80 MILLIGRAM(S): 80 TABLET, FILM COATED ORAL at 21:39

## 2020-08-02 RX ADMIN — LOSARTAN POTASSIUM 100 MILLIGRAM(S): 100 TABLET, FILM COATED ORAL at 06:21

## 2020-08-02 RX ADMIN — Medication 25 MILLIGRAM(S): at 06:21

## 2020-08-02 RX ADMIN — Medication 100 MILLIGRAM(S): at 17:22

## 2020-08-02 RX ADMIN — TRAMADOL HYDROCHLORIDE 50 MILLIGRAM(S): 50 TABLET ORAL at 12:36

## 2020-08-02 RX ADMIN — Medication 1 PACKET(S): at 17:22

## 2020-08-02 RX ADMIN — TRAMADOL HYDROCHLORIDE 50 MILLIGRAM(S): 50 TABLET ORAL at 22:39

## 2020-08-02 RX ADMIN — Medication 100 MILLIGRAM(S): at 21:40

## 2020-08-02 RX ADMIN — TRAMADOL HYDROCHLORIDE 50 MILLIGRAM(S): 50 TABLET ORAL at 21:39

## 2020-08-02 RX ADMIN — Medication 2 MILLIGRAM(S): at 10:09

## 2020-08-02 RX ADMIN — TRAMADOL HYDROCHLORIDE 50 MILLIGRAM(S): 50 TABLET ORAL at 07:00

## 2020-08-02 RX ADMIN — TRAMADOL HYDROCHLORIDE 50 MILLIGRAM(S): 50 TABLET ORAL at 00:00

## 2020-08-02 NOTE — PROGRESS NOTE ADULT - ASSESSMENT
70 y/o F s/p GLF, lumbar hematoma.     -Continue IV abx   -Trend Hgb   - Monitor blood glucose   - F/U PT for home   - F/U blood Cx   - F/U Urine Cx   - Encourage out of bed as tolerated

## 2020-08-03 LAB
-  AMPICILLIN/SULBACTAM: SIGNIFICANT CHANGE UP
-  CEFAZOLIN: SIGNIFICANT CHANGE UP
-  CLINDAMYCIN: SIGNIFICANT CHANGE UP
-  ERYTHROMYCIN: SIGNIFICANT CHANGE UP
-  GENTAMICIN: SIGNIFICANT CHANGE UP
-  OXACILLIN: SIGNIFICANT CHANGE UP
-  PENICILLIN: SIGNIFICANT CHANGE UP
-  RIFAMPIN: SIGNIFICANT CHANGE UP
-  TETRACYCLINE: SIGNIFICANT CHANGE UP
-  TRIMETHOPRIM/SULFAMETHOXAZOLE: SIGNIFICANT CHANGE UP
-  VANCOMYCIN: SIGNIFICANT CHANGE UP
ANION GAP SERPL CALC-SCNC: 12 MMOL/L — SIGNIFICANT CHANGE UP (ref 5–17)
BASOPHILS # BLD AUTO: 0.03 K/UL — SIGNIFICANT CHANGE UP (ref 0–0.2)
BASOPHILS NFR BLD AUTO: 0.4 % — SIGNIFICANT CHANGE UP (ref 0–2)
BUN SERPL-MCNC: 12 MG/DL — SIGNIFICANT CHANGE UP (ref 8–20)
CALCIUM SERPL-MCNC: 8.5 MG/DL — LOW (ref 8.6–10.2)
CHLORIDE SERPL-SCNC: 97 MMOL/L — LOW (ref 98–107)
CO2 SERPL-SCNC: 26 MMOL/L — SIGNIFICANT CHANGE UP (ref 22–29)
CREAT SERPL-MCNC: 0.65 MG/DL — SIGNIFICANT CHANGE UP (ref 0.5–1.3)
EOSINOPHIL # BLD AUTO: 0.13 K/UL — SIGNIFICANT CHANGE UP (ref 0–0.5)
EOSINOPHIL NFR BLD AUTO: 1.6 % — SIGNIFICANT CHANGE UP (ref 0–6)
GLUCOSE BLDC GLUCOMTR-MCNC: 113 MG/DL — HIGH (ref 70–99)
GLUCOSE BLDC GLUCOMTR-MCNC: 113 MG/DL — HIGH (ref 70–99)
GLUCOSE BLDC GLUCOMTR-MCNC: 118 MG/DL — HIGH (ref 70–99)
GLUCOSE BLDC GLUCOMTR-MCNC: 120 MG/DL — HIGH (ref 70–99)
GLUCOSE SERPL-MCNC: 98 MG/DL — SIGNIFICANT CHANGE UP (ref 70–99)
HCT VFR BLD CALC: 40.7 % — SIGNIFICANT CHANGE UP (ref 34.5–45)
HGB BLD-MCNC: 12.9 G/DL — SIGNIFICANT CHANGE UP (ref 11.5–15.5)
IMM GRANULOCYTES NFR BLD AUTO: 0.8 % — SIGNIFICANT CHANGE UP (ref 0–1.5)
LYMPHOCYTES # BLD AUTO: 1.41 K/UL — SIGNIFICANT CHANGE UP (ref 1–3.3)
LYMPHOCYTES # BLD AUTO: 17 % — SIGNIFICANT CHANGE UP (ref 13–44)
MAGNESIUM SERPL-MCNC: 1.5 MG/DL — LOW (ref 1.6–2.6)
MCHC RBC-ENTMCNC: 26.8 PG — LOW (ref 27–34)
MCHC RBC-ENTMCNC: 31.7 GM/DL — LOW (ref 32–36)
MCV RBC AUTO: 84.4 FL — SIGNIFICANT CHANGE UP (ref 80–100)
METHOD TYPE: SIGNIFICANT CHANGE UP
MONOCYTES # BLD AUTO: 0.55 K/UL — SIGNIFICANT CHANGE UP (ref 0–0.9)
MONOCYTES NFR BLD AUTO: 6.6 % — SIGNIFICANT CHANGE UP (ref 2–14)
NEUTROPHILS # BLD AUTO: 6.11 K/UL — SIGNIFICANT CHANGE UP (ref 1.8–7.4)
NEUTROPHILS NFR BLD AUTO: 73.6 % — SIGNIFICANT CHANGE UP (ref 43–77)
PHOSPHATE SERPL-MCNC: 3.2 MG/DL — SIGNIFICANT CHANGE UP (ref 2.4–4.7)
PLATELET # BLD AUTO: 398 K/UL — SIGNIFICANT CHANGE UP (ref 150–400)
POTASSIUM SERPL-MCNC: 3.3 MMOL/L — LOW (ref 3.5–5.3)
POTASSIUM SERPL-SCNC: 3.3 MMOL/L — LOW (ref 3.5–5.3)
RBC # BLD: 4.82 M/UL — SIGNIFICANT CHANGE UP (ref 3.8–5.2)
RBC # FLD: 20.7 % — HIGH (ref 10.3–14.5)
SODIUM SERPL-SCNC: 135 MMOL/L — SIGNIFICANT CHANGE UP (ref 135–145)
WBC # BLD: 8.3 K/UL — SIGNIFICANT CHANGE UP (ref 3.8–10.5)
WBC # FLD AUTO: 8.3 K/UL — SIGNIFICANT CHANGE UP (ref 3.8–10.5)

## 2020-08-03 RX ORDER — MAGNESIUM SULFATE 500 MG/ML
2 VIAL (ML) INJECTION ONCE
Refills: 0 | Status: COMPLETED | OUTPATIENT
Start: 2020-08-03 | End: 2020-08-03

## 2020-08-03 RX ORDER — POTASSIUM CHLORIDE 20 MEQ
40 PACKET (EA) ORAL ONCE
Refills: 0 | Status: COMPLETED | OUTPATIENT
Start: 2020-08-03 | End: 2020-08-03

## 2020-08-03 RX ADMIN — ENOXAPARIN SODIUM 40 MILLIGRAM(S): 100 INJECTION SUBCUTANEOUS at 11:36

## 2020-08-03 RX ADMIN — Medication 180 MILLIGRAM(S): at 05:15

## 2020-08-03 RX ADMIN — Medication 100 MILLIGRAM(S): at 22:26

## 2020-08-03 RX ADMIN — Medication 50 MILLIGRAM(S): at 05:15

## 2020-08-03 RX ADMIN — TRAMADOL HYDROCHLORIDE 50 MILLIGRAM(S): 50 TABLET ORAL at 16:00

## 2020-08-03 RX ADMIN — ATORVASTATIN CALCIUM 80 MILLIGRAM(S): 80 TABLET, FILM COATED ORAL at 22:20

## 2020-08-03 RX ADMIN — Medication 40 MILLIEQUIVALENT(S): at 13:21

## 2020-08-03 RX ADMIN — Medication 25 MILLIGRAM(S): at 05:15

## 2020-08-03 RX ADMIN — Medication 50 GRAM(S): at 13:21

## 2020-08-03 RX ADMIN — Medication 100 MILLIGRAM(S): at 13:21

## 2020-08-03 RX ADMIN — TRAMADOL HYDROCHLORIDE 50 MILLIGRAM(S): 50 TABLET ORAL at 09:15

## 2020-08-03 RX ADMIN — LOSARTAN POTASSIUM 100 MILLIGRAM(S): 100 TABLET, FILM COATED ORAL at 05:15

## 2020-08-03 RX ADMIN — Medication 1 PACKET(S): at 05:15

## 2020-08-03 RX ADMIN — NORTRIPTYLINE HYDROCHLORIDE 10 MILLIGRAM(S): 10 CAPSULE ORAL at 11:37

## 2020-08-03 RX ADMIN — Medication 100 MILLIGRAM(S): at 05:15

## 2020-08-03 RX ADMIN — TRAMADOL HYDROCHLORIDE 50 MILLIGRAM(S): 50 TABLET ORAL at 08:47

## 2020-08-03 RX ADMIN — TRAMADOL HYDROCHLORIDE 50 MILLIGRAM(S): 50 TABLET ORAL at 15:35

## 2020-08-04 ENCOUNTER — TRANSCRIPTION ENCOUNTER (OUTPATIENT)
Age: 70
End: 2020-08-04

## 2020-08-04 LAB
ANION GAP SERPL CALC-SCNC: 14 MMOL/L — SIGNIFICANT CHANGE UP (ref 5–17)
BUN SERPL-MCNC: 10 MG/DL — SIGNIFICANT CHANGE UP (ref 8–20)
CALCIUM SERPL-MCNC: 8.4 MG/DL — LOW (ref 8.6–10.2)
CHLORIDE SERPL-SCNC: 96 MMOL/L — LOW (ref 98–107)
CO2 SERPL-SCNC: 23 MMOL/L — SIGNIFICANT CHANGE UP (ref 22–29)
CREAT SERPL-MCNC: 0.62 MG/DL — SIGNIFICANT CHANGE UP (ref 0.5–1.3)
GLUCOSE BLDC GLUCOMTR-MCNC: 107 MG/DL — HIGH (ref 70–99)
GLUCOSE BLDC GLUCOMTR-MCNC: 74 MG/DL — SIGNIFICANT CHANGE UP (ref 70–99)
GLUCOSE BLDC GLUCOMTR-MCNC: 81 MG/DL — SIGNIFICANT CHANGE UP (ref 70–99)
GLUCOSE SERPL-MCNC: 101 MG/DL — HIGH (ref 70–99)
MAGNESIUM SERPL-MCNC: 1.7 MG/DL — SIGNIFICANT CHANGE UP (ref 1.6–2.6)
POTASSIUM SERPL-MCNC: 3.6 MMOL/L — SIGNIFICANT CHANGE UP (ref 3.5–5.3)
POTASSIUM SERPL-SCNC: 3.6 MMOL/L — SIGNIFICANT CHANGE UP (ref 3.5–5.3)
SODIUM SERPL-SCNC: 133 MMOL/L — LOW (ref 135–145)

## 2020-08-04 PROCEDURE — 99232 SBSQ HOSP IP/OBS MODERATE 35: CPT | Mod: GC

## 2020-08-04 RX ORDER — APIXABAN 2.5 MG/1
5 TABLET, FILM COATED ORAL EVERY 12 HOURS
Refills: 0 | Status: DISCONTINUED | OUTPATIENT
Start: 2020-08-04 | End: 2020-08-05

## 2020-08-04 RX ADMIN — TRAMADOL HYDROCHLORIDE 50 MILLIGRAM(S): 50 TABLET ORAL at 17:29

## 2020-08-04 RX ADMIN — ATORVASTATIN CALCIUM 80 MILLIGRAM(S): 80 TABLET, FILM COATED ORAL at 21:56

## 2020-08-04 RX ADMIN — TRAMADOL HYDROCHLORIDE 50 MILLIGRAM(S): 50 TABLET ORAL at 05:56

## 2020-08-04 RX ADMIN — Medication 180 MILLIGRAM(S): at 12:58

## 2020-08-04 RX ADMIN — TRAMADOL HYDROCHLORIDE 50 MILLIGRAM(S): 50 TABLET ORAL at 19:37

## 2020-08-04 RX ADMIN — APIXABAN 5 MILLIGRAM(S): 2.5 TABLET, FILM COATED ORAL at 17:29

## 2020-08-04 RX ADMIN — SODIUM CHLORIDE 84 MILLILITER(S): 9 INJECTION, SOLUTION INTRAVENOUS at 05:45

## 2020-08-04 RX ADMIN — LOSARTAN POTASSIUM 100 MILLIGRAM(S): 100 TABLET, FILM COATED ORAL at 12:58

## 2020-08-04 RX ADMIN — Medication 100 MILLIGRAM(S): at 05:44

## 2020-08-04 RX ADMIN — ENOXAPARIN SODIUM 40 MILLIGRAM(S): 100 INJECTION SUBCUTANEOUS at 12:57

## 2020-08-04 RX ADMIN — TRAMADOL HYDROCHLORIDE 50 MILLIGRAM(S): 50 TABLET ORAL at 06:36

## 2020-08-04 RX ADMIN — Medication 50 MILLIGRAM(S): at 05:44

## 2020-08-04 RX ADMIN — Medication 25 MILLIGRAM(S): at 05:45

## 2020-08-04 RX ADMIN — NORTRIPTYLINE HYDROCHLORIDE 10 MILLIGRAM(S): 10 CAPSULE ORAL at 12:57

## 2020-08-04 NOTE — DISCHARGE NOTE NURSING/CASE MANAGEMENT/SOCIAL WORK - NSDCPEWEB_GEN_ALL_CORE
[facility] for Tobacco Control website --- [email or web address] website --- [email or web address]

## 2020-08-04 NOTE — DISCHARGE NOTE NURSING/CASE MANAGEMENT/SOCIAL WORK - PATIENT PORTAL LINK FT
You can access the FollowMyHealth Patient Portal offered by [facility] by registering at the following website: [email or web address]. By joining [facility]’s FollowMyHealth portal, you will also be able to view your health information using other applications (apps) compatible with our system.

## 2020-08-04 NOTE — DISCHARGE NOTE NURSING/CASE MANAGEMENT/SOCIAL WORK - NSDCPEEMAIL_GEN_ALL_CORE
[facility] for Tobacco Control email [email or web address]

## 2020-08-04 NOTE — PROGRESS NOTE ADULT - ATTENDING COMMENTS
69 yoF is s/p fall where she suffered lower back hematoma and mesenteric contusion.  AAOx3, NAD, Pain improved and H/H stable  Plan  1. UTI and started 1g of Ceftriaxone  2. Continue to trend hgb  3. Trial of void  4. Patient stable to d/c to home    code [id number]
I have seen and examined patient.  complaining back pain, hematoma soft, ecchymosis lumbar area, skin soft not under tension.  Cont multimodality analgesia  ok to start dvt chemoprophylaxes  PT  dispo planning
Patient seen and examined  agree with note and exam  patient with lumbar hematoma which has been stable  patient with ? bacteremia- likely contaminant  will d/c abx  patient cleared for discharge  patient maybe restarted on eliquis
I have seen and examined the patient  blood culture with coag neg staph: mot likely contaminant  cont ceftriaxone until finalization of cultures  Dsipo planning  PT

## 2020-08-04 NOTE — PROGRESS NOTE ADULT - ASSESSMENT
Patient is a 68 yo F who is s/p GLF resulting in a lumbar hematoma, mesenteric contusion whose hospital course has been complicated by UTI and bacteremia.    Plan:  - BlCLx from 8/1 NGTD  - daily BCx until negative  - monitor for hematoma expansion  - continue IV ABx  - f/u Hgb  - f/u PT

## 2020-08-05 ENCOUNTER — TRANSCRIPTION ENCOUNTER (OUTPATIENT)
Age: 70
End: 2020-08-05

## 2020-08-05 VITALS
DIASTOLIC BLOOD PRESSURE: 59 MMHG | RESPIRATION RATE: 18 BRPM | SYSTOLIC BLOOD PRESSURE: 98 MMHG | HEART RATE: 97 BPM | TEMPERATURE: 99 F | OXYGEN SATURATION: 93 %

## 2020-08-05 DIAGNOSIS — T14.8XXA OTHER INJURY OF UNSPECIFIED BODY REGION, INITIAL ENCOUNTER: ICD-10-CM

## 2020-08-05 DIAGNOSIS — I48.91 UNSPECIFIED ATRIAL FIBRILLATION: ICD-10-CM

## 2020-08-05 LAB — GLUCOSE BLDC GLUCOMTR-MCNC: 99 MG/DL — SIGNIFICANT CHANGE UP (ref 70–99)

## 2020-08-05 PROCEDURE — 36430 TRANSFUSION BLD/BLD COMPNT: CPT | Mod: XU

## 2020-08-05 PROCEDURE — 80053 COMPREHEN METABOLIC PANEL: CPT

## 2020-08-05 PROCEDURE — 86923 COMPATIBILITY TEST ELECTRIC: CPT

## 2020-08-05 PROCEDURE — 83036 HEMOGLOBIN GLYCOSYLATED A1C: CPT

## 2020-08-05 PROCEDURE — 96375 TX/PRO/DX INJ NEW DRUG ADDON: CPT | Mod: XU

## 2020-08-05 PROCEDURE — 99291 CRITICAL CARE FIRST HOUR: CPT | Mod: 25

## 2020-08-05 PROCEDURE — 84484 ASSAY OF TROPONIN QUANT: CPT

## 2020-08-05 PROCEDURE — 84100 ASSAY OF PHOSPHORUS: CPT

## 2020-08-05 PROCEDURE — 85027 COMPLETE CBC AUTOMATED: CPT

## 2020-08-05 PROCEDURE — 80048 BASIC METABOLIC PNL TOTAL CA: CPT

## 2020-08-05 PROCEDURE — 71045 X-RAY EXAM CHEST 1 VIEW: CPT

## 2020-08-05 PROCEDURE — 86769 SARS-COV-2 COVID-19 ANTIBODY: CPT

## 2020-08-05 PROCEDURE — 96361 HYDRATE IV INFUSION ADD-ON: CPT

## 2020-08-05 PROCEDURE — 83605 ASSAY OF LACTIC ACID: CPT

## 2020-08-05 PROCEDURE — P9016: CPT

## 2020-08-05 PROCEDURE — 87040 BLOOD CULTURE FOR BACTERIA: CPT

## 2020-08-05 PROCEDURE — 85730 THROMBOPLASTIN TIME PARTIAL: CPT

## 2020-08-05 PROCEDURE — 87150 DNA/RNA AMPLIFIED PROBE: CPT

## 2020-08-05 PROCEDURE — 87635 SARS-COV-2 COVID-19 AMP PRB: CPT

## 2020-08-05 PROCEDURE — 83735 ASSAY OF MAGNESIUM: CPT

## 2020-08-05 PROCEDURE — 82962 GLUCOSE BLOOD TEST: CPT

## 2020-08-05 PROCEDURE — 97163 PT EVAL HIGH COMPLEX 45 MIN: CPT

## 2020-08-05 PROCEDURE — 36415 COLL VENOUS BLD VENIPUNCTURE: CPT

## 2020-08-05 PROCEDURE — 96365 THER/PROPH/DIAG IV INF INIT: CPT | Mod: XU

## 2020-08-05 PROCEDURE — 97167 OT EVAL HIGH COMPLEX 60 MIN: CPT

## 2020-08-05 PROCEDURE — 86850 RBC ANTIBODY SCREEN: CPT

## 2020-08-05 PROCEDURE — 86803 HEPATITIS C AB TEST: CPT

## 2020-08-05 PROCEDURE — 81001 URINALYSIS AUTO W/SCOPE: CPT

## 2020-08-05 PROCEDURE — 93005 ELECTROCARDIOGRAM TRACING: CPT

## 2020-08-05 PROCEDURE — 86900 BLOOD TYPING SEROLOGIC ABO: CPT

## 2020-08-05 PROCEDURE — 74176 CT ABD & PELVIS W/O CONTRAST: CPT

## 2020-08-05 PROCEDURE — 86901 BLOOD TYPING SEROLOGIC RH(D): CPT

## 2020-08-05 PROCEDURE — 85610 PROTHROMBIN TIME: CPT

## 2020-08-05 PROCEDURE — 87086 URINE CULTURE/COLONY COUNT: CPT

## 2020-08-05 PROCEDURE — 87186 SC STD MICRODIL/AGAR DIL: CPT

## 2020-08-05 PROCEDURE — 51702 INSERT TEMP BLADDER CATH: CPT

## 2020-08-05 RX ORDER — ACETAMINOPHEN 500 MG
2 TABLET ORAL
Qty: 0 | Refills: 0 | DISCHARGE
Start: 2020-08-05

## 2020-08-05 RX ORDER — TRAMADOL HYDROCHLORIDE 50 MG/1
1 TABLET ORAL
Qty: 0 | Refills: 0 | DISCHARGE
Start: 2020-08-05

## 2020-08-05 RX ORDER — ATORVASTATIN CALCIUM 80 MG/1
1 TABLET, FILM COATED ORAL
Qty: 0 | Refills: 0 | DISCHARGE
Start: 2020-08-05

## 2020-08-05 RX ORDER — TRAMADOL HYDROCHLORIDE 50 MG/1
0.5 TABLET ORAL
Qty: 10 | Refills: 0
Start: 2020-08-05

## 2020-08-05 RX ORDER — TRAMADOL HYDROCHLORIDE 50 MG/1
0.5 TABLET ORAL
Qty: 0 | Refills: 0 | DISCHARGE
Start: 2020-08-05

## 2020-08-05 RX ADMIN — TRAMADOL HYDROCHLORIDE 50 MILLIGRAM(S): 50 TABLET ORAL at 10:38

## 2020-08-05 RX ADMIN — APIXABAN 5 MILLIGRAM(S): 2.5 TABLET, FILM COATED ORAL at 06:16

## 2020-08-05 RX ADMIN — TRAMADOL HYDROCHLORIDE 50 MILLIGRAM(S): 50 TABLET ORAL at 11:30

## 2020-08-05 NOTE — PROGRESS NOTE ADULT - REASON FOR ADMISSION
Lumbar region hematoma
Lumbar region hematoma
Lumbar Hematoma
Lumbar region hematoma

## 2020-08-05 NOTE — PHYSICAL THERAPY INITIAL EVALUATION ADULT - DISCHARGE DISPOSITION, PT EVAL
Patient with LE weakness, able to maintain independence with functional mobility. PT will sign off./home w/ home PT
home, use of her RW

## 2020-08-05 NOTE — PHYSICAL THERAPY INITIAL EVALUATION ADULT - PERTINENT HX OF CURRENT PROBLEM, REHAB EVAL
pt presents to [facility] due to s/p fall, hypotensive, abdominal and back pain
pt presents to [facility] due to s/p fall, hypotensive, abdominal and back pain

## 2020-08-05 NOTE — DISCHARGE NOTE PROVIDER - HOSPITAL COURSE
Ms. Pitt is a 69y Female on Eliquis for Afib who fell 5 days ago in her shower and landed on her back, had no LOC, got up by herself, and was feeling fine. Today she felt dizzy and her back pain was getting worse, reason for coming to the hospital. Patient states that she has had persistent back pain for the past 2-3 years but it got worse with her fall. No other complaints. Denies fever, chills, nausea, vomiting, chest pain, and sob.     Admitted to the trauma service and evaluated by orthopedic team Ms. Pitt is a 69y Female on Eliquis for Afib who fell 5 days ago in her shower and landed on her back, had no LOC, got up by herself, and was feeling fine. Today she felt dizzy and her back pain was getting worse, reason for coming to the hospital. Patient states that she has had persistent back pain for the past 2-3 years but it got worse with her fall. No other complaints. Denies fever, chills, nausea, vomiting, chest pain, and sob.     Admitted to the trauma service for strength and gait training. She was found to have positive blood cultures on this admission likely secondary to contaminant. She completed a course of antibiotics, blood cultures now negative. Patient afebrile and VS stable. She is ambulating with assistance. Clinically stable for discharge.

## 2020-08-05 NOTE — DISCHARGE NOTE PROVIDER - CARE PROVIDER_API CALL
Barrie Judd  Surgery  [address] [place] [id number]  Phone: [phone]  Fax: [phone]  Follow Up Time: 2 weeks

## 2020-08-05 NOTE — DISCHARGE NOTE PROVIDER - NSDCMRMEDTOKEN_GEN_ALL_CORE_FT
allopurinol 100 mg oral tablet: 1 tab(s) orally once a day  aspirin 81 mg oral delayed release tablet: 1 tab(s) orally once a day  dilTIAZem 180 mg/24 hours oral capsule, extended release: 1 cap(s) orally once a day  Eliquis 5 mg oral tablet: 1 tab(s) orally 2 times a day  glipiZIDE 5 mg oral tablet: 1 tab(s) orally once a day  metFORMIN 500 mg oral tablet: 1 tab(s) orally 2 times a day  metoprolol tartrate 50 mg oral tablet: 1 tab(s) orally 2 times a day  nortriptyline 10 mg oral capsule: 1-2 cap(s) orally once a day at bedtime  olmesartan-hydrochlorothiazide 40 mg-25 mg oral tablet: 1 tab(s) orally once a day  rosuvastatin 20 mg oral tablet: 1 tab(s) orally once a day acetaminophen 325 mg oral tablet: 2 tab(s) orally every 6 hours, As needed, Mild Pain (1 - 3)  allopurinol 100 mg oral tablet: 1 tab(s) orally once a day  aspirin 81 mg oral delayed release tablet: 1 tab(s) orally once a day  atorvastatin 80 mg oral tablet: 1 tab(s) orally once a day (at bedtime)  dilTIAZem 180 mg/24 hours oral capsule, extended release: 1 cap(s) orally once a day  Eliquis 5 mg oral tablet: 1 tab(s) orally 2 times a day  glipiZIDE 5 mg oral tablet: 1 tab(s) orally once a day  metFORMIN 500 mg oral tablet: 1 tab(s) orally 2 times a day  metoprolol tartrate 50 mg oral tablet: 1 tab(s) orally 2 times a day  nortriptyline 10 mg oral capsule: 1-2 cap(s) orally once a day at bedtime  olmesartan-hydrochlorothiazide 40 mg-25 mg oral tablet: 1 tab(s) orally once a day  rosuvastatin 20 mg oral tablet: 1 tab(s) orally once a day  traMADol 50 mg oral tablet: 0.5 tab(s) orally every 4 hours, As needed, Moderate Pain (4 - 6)  traMADol 50 mg oral tablet: 1 tab(s) orally every 4 hours, As needed, Severe Pain (7 - 10) acetaminophen 325 mg oral tablet: 2 tab(s) orally every 6 hours, As needed, Mild Pain (1 - 3)  allopurinol 100 mg oral tablet: 1 tab(s) orally once a day  aspirin 81 mg oral delayed release tablet: 1 tab(s) orally once a day  atorvastatin 80 mg oral tablet: 1 tab(s) orally once a day (at bedtime)  dilTIAZem 180 mg/24 hours oral capsule, extended release: 1 cap(s) orally once a day  Eliquis 5 mg oral tablet: 1 tab(s) orally 2 times a day  glipiZIDE 5 mg oral tablet: 1 tab(s) orally once a day  metFORMIN 500 mg oral tablet: 1 tab(s) orally 2 times a day  metoprolol tartrate 50 mg oral tablet: 1 tab(s) orally 2 times a day  nortriptyline 10 mg oral capsule: 1-2 cap(s) orally once a day at bedtime  olmesartan-hydrochlorothiazide 40 mg-25 mg oral tablet: 1 tab(s) orally once a day  rosuvastatin 20 mg oral tablet: 1 tab(s) orally once a day  traMADol 50 mg oral tablet: 1 tab(s) orally every 4 hours, As needed, Severe Pain (7 - 10)  traMADol 50 mg oral tablet: 0.5 tab(s) orally every 4 hours, As needed, Moderate Pain (4 - 6) MDD:4 TAB

## 2020-08-05 NOTE — PHYSICAL THERAPY INITIAL EVALUATION ADULT - GENERAL OBSERVATIONS, REHAB EVAL
pt received semi herbert position in bed, NAD, agreeable to PT, meaghan nielsen monitor
Patient received lying in bed, NAD, breathing RA. Pt agreeable to Physical Therapy evaluation.

## 2020-08-05 NOTE — DISCHARGE NOTE PROVIDER - NSDCCPCAREPLAN_GEN_ALL_CORE_FT
PRINCIPAL DISCHARGE DIAGNOSIS  Diagnosis: Hypotension  Assessment and Plan of Treatment:       SECONDARY DISCHARGE DIAGNOSES  Diagnosis: Hematoma  Assessment and Plan of Treatment: Follow up: Please call and make an appointment with the [facility] 10-14 days after discharge. Also, please call and make an appointment with your primary care physician as per your usual schedule.   Activity: May return to normal activities as tolerated, however refrain from heavy lifting > 10-15 lbs.  Diet: May continue regular diet.  Medications: Please take all medications listed on your discharge paperwork as prescribed. Pain medication has been prescribed for you. Please, take it as it has been prescribed, do not drive or operate heavy machinery while taking narcotics.  You are encouraged to take over-the-counter tylenol and/or ibuprofen for pain relief when you feel your pain no longer warrants the use of narcotic pain medications, however DO NOT TAKE percocet and tylenol at the same time as they contain the same active ingredient (acetaminophen). Take only percocet OR tylenol.  Patient is advised to RETURN TO THE EMERGENCY DEPARTMENT for any of the following - worsening pain, fever/chills, nausea/vomiting, altered mental status, chest pain, shortness of breath, or any other new / worsening symptom.

## 2020-08-05 NOTE — PROGRESS NOTE ADULT - SUBJECTIVE AND OBJECTIVE BOX
INTERVAL HPI/OVERNIGHT EVENTS:    SUBJECTIVE: Patient evaluated at bedside, found resting comfortably in bed, nad. No acute complaints or concerns. lumbar hematoma stable.       MEDICATIONS  (STANDING):  atorvastatin 80 milliGRAM(s) Oral at bedtime  cefTRIAXone   IVPB 1000 milliGRAM(s) IV Intermittent every 24 hours  cefTRIAXone   IVPB      dextrose 5% + lactated ringers. 1000 milliLiter(s) (84 mL/Hr) IV Continuous <Continuous>  dextrose 5%. 1000 milliLiter(s) (50 mL/Hr) IV Continuous <Continuous>  dextrose 50% Injectable 12.5 Gram(s) IV Push once  dextrose 50% Injectable 25 Gram(s) IV Push once  dextrose 50% Injectable 25 Gram(s) IV Push once  diltiazem    milliGRAM(s) Oral daily  enoxaparin Injectable 40 milliGRAM(s) SubCutaneous daily  hydrochlorothiazide 25 milliGRAM(s) Oral daily  insulin lispro (HumaLOG) corrective regimen sliding scale   SubCutaneous three times a day before meals  losartan 100 milliGRAM(s) Oral daily  metoprolol tartrate 50 milliGRAM(s) Oral daily  nortriptyline 10 milliGRAM(s) Oral daily    MEDICATIONS  (PRN):  acetaminophen   Tablet .. 650 milliGRAM(s) Oral every 6 hours PRN Mild Pain (1 - 3)  dextrose 40% Gel 15 Gram(s) Oral once PRN Blood Glucose LESS THAN 70 milliGRAM(s)/deciliter  glucagon  Injectable 1 milliGRAM(s) IntraMuscular once PRN Glucose LESS THAN 70 milligrams/deciliter  traMADol 25 milliGRAM(s) Oral every 4 hours PRN Moderate Pain (4 - 6)  traMADol 50 milliGRAM(s) Oral every 4 hours PRN Severe Pain (7 - 10)      Vital Signs Last 24 Hrs  T(C): 36.8 (01 Aug 2020 00:17), Max: 37.9 (2020 05:09)  T(F): 98.3 (01 Aug 2020 00:17), Max: 100.2 (2020 05:09)  HR: 76 (01 Aug 2020 00:17) (76 - 108)  BP: 135/70 (01 Aug 2020 00:17) (112/58 - 135/70)  BP(mean): --  RR: 18 (01 Aug 2020 00:17) (18 - 18)  SpO2: 96% (01 Aug 2020 00:17) (91% - 99%)    PE  Gen: resting comfortably in bed, nad  Pulm: no increased wob, no conversational dyspnea  Abd: non-distended, soft, non-tender  MSK: back with large hematoma at lumbar level covering roughly 1/3 to 1/2 of lower back, non-expanding  Ext: distal extremities warm and well-perfused, no peripheral edema        I&O's Detail    2020 07:01  -  2020 07:00  --------------------------------------------------------  IN:  Total IN: 0 mL    OUT:    Indwelling Catheter - Urethral: 1025 mL  Total OUT: 1025 mL    Total NET: -1025 mL          LABS:                        13.0   6.82  )-----------( 343      ( 2020 05:45 )             41.0     0731    139  |  103  |  35.0<H>  ----------------------------<  94  4.0   |  19.0<L>  |  1.24    Ca    8.9      2020 05:45  Phos  2.8     07-31  Mg     1.4     07-31      PT/INR - ( 2020 09:48 )   PT: 16.8 sec;   INR: 1.48 ratio         PTT - ( 2020 09:48 )  PTT:35.9 sec  Urinalysis Basic - ( 2020 22:12 )    Color: Brown / Appearance: Cloudy / S.015 / pH: x  Gluc: x / Ketone: Trace  / Bili: Small / Urobili: 1   Blood: x / Protein: 100 / Nitrite: Positive   Leuk Esterase: Moderate / RBC: >50 /HPF / WBC 11-25   Sq Epi: x / Non Sq Epi: Occasional / Bacteria: Moderate        RADIOLOGY & ADDITIONAL STUDIES:
6Hour Sepsis Reassessment    I have seen and examined the patient for a repeat volume status and tissue perfusion assessment     Vital Signs Last 24 Hrs  T(C): 36.9 (31 Jul 2020 00:57), Max: 39.1 (30 Jul 2020 21:21)  T(F): 98.4 (31 Jul 2020 00:57), Max: 102.3 (30 Jul 2020 21:21)  HR: 89 (31 Jul 2020 00:57) (59 - 105)  BP: 108/68 (31 Jul 2020 00:57) (99/61 - 133/66)  BP(mean): --  RR: 18 (31 Jul 2020 00:57) (16 - 20)  SpO2: 98% (31 Jul 2020 00:57) (80% - 100%)  		  Pulm:  [x ]Clear bilaterally [  ] Wheeze [  ] Rhonchi [  ] Rales [  ] Crackles; Other:  Card: [ x]RRR [  ] No murmur[  ]  Normal S1S2[ x ] Tachycardia;  Other:  CAPILLARY REFULL:  	Fingers: [x  ] less than 2 seconds [  ] more than 2 seconds                                           Toes: [  ]  less than 2 seconds [  ] more than 2 seconds   PERIPHERAL PULSES:  Radial: [x  ] Palpable  [  ]  non-palpable                                         Dorsalis Pedis: [  ] Palpable  [  ] non-palpable                                         Posterior Tibial: [  ] Palpable  [  ] non-palpable                                          Other:  SKIN:   [  ]  Diaphoretic  [  ]  mottling  [  ]  Cold extremities  [x  ]  Warm [  ]  Dry                      Other:    BEDSIDE ULTRASOUND FINDINGS (IF APPLICABLE):    Labs:  30 Jul 2020 22:05    135    |  102    |  37.0   ----------------------------<  94     4.2     |  16.0   |  1.17     Ca    9.0        30 Jul 2020 22:05  Phos  2.5       30 Jul 2020 22:05  Mg     1.4       30 Jul 2020 22:05                            12.6   9.00  )-----------( 382      ( 30 Jul 2020 22:05 )             39.6     PT/INR - ( 30 Jul 2020 09:48 )   PT: 16.8 sec;   INR: 1.48 ratio         PTT - ( 30 Jul 2020 09:48 )  PTT:35.9 sec  Lactate:
INTERVAL HPI/OVERNIGHT EVENTS:    No acute overnight events reported. Patient seen and evaluated at bedside with no major complaints. She states that her pain is well controlled with the current pain regimen. Tolerating her diet, denies any nausea nor emesis. AVSS      MEDICATIONS  (STANDING):  atorvastatin 80 milliGRAM(s) Oral at bedtime  ceFAZolin   IVPB      ceFAZolin   IVPB 2000 milliGRAM(s) IV Intermittent every 8 hours  dextrose 5% + lactated ringers. 1000 milliLiter(s) (84 mL/Hr) IV Continuous <Continuous>  dextrose 5%. 1000 milliLiter(s) (50 mL/Hr) IV Continuous <Continuous>  dextrose 50% Injectable 12.5 Gram(s) IV Push once  dextrose 50% Injectable 25 Gram(s) IV Push once  dextrose 50% Injectable 25 Gram(s) IV Push once  diltiazem    milliGRAM(s) Oral daily  enoxaparin Injectable 40 milliGRAM(s) SubCutaneous daily  hydrochlorothiazide 25 milliGRAM(s) Oral daily  insulin lispro (HumaLOG) corrective regimen sliding scale   SubCutaneous three times a day before meals  losartan 100 milliGRAM(s) Oral daily  metoprolol tartrate 50 milliGRAM(s) Oral daily  nortriptyline 10 milliGRAM(s) Oral daily    MEDICATIONS  (PRN):  acetaminophen   Tablet .. 650 milliGRAM(s) Oral every 6 hours PRN Mild Pain (1 - 3)  dextrose 40% Gel 15 Gram(s) Oral once PRN Blood Glucose LESS THAN 70 milliGRAM(s)/deciliter  glucagon  Injectable 1 milliGRAM(s) IntraMuscular once PRN Glucose LESS THAN 70 milligrams/deciliter  traMADol 25 milliGRAM(s) Oral every 4 hours PRN Moderate Pain (4 - 6)  traMADol 50 milliGRAM(s) Oral every 4 hours PRN Severe Pain (7 - 10)      Vital Signs Last 24 Hrs  T(C): 37.4 (03 Aug 2020 23:49), Max: 37.4 (03 Aug 2020 23:49)  T(F): 99.3 (03 Aug 2020 23:49), Max: 99.3 (03 Aug 2020 23:49)  HR: 88 (03 Aug 2020 23:49) (76 - 90)  BP: 117/75 (03 Aug 2020 23:49) (101/53 - 133/71)  BP(mean): --  RR: 18 (03 Aug 2020 23:49) (18 - 18)  SpO2: 97% (03 Aug 2020 23:49) (93% - 98%)    PE  Gen: NAD  Neurological:  No sensory/motor deficits  HEENT: PERRLA, EOMI  Respiratory: Breath Sounds equal & CTA bilaterally, no accessory muscle use  Cardiovascular: Regular rate & rhythm, normal S1, S2; no murmurs, gallops or rubs  Gastrointestinal: Soft, non-tender, nondistended  Vascular: Equal and normal pulses: 2+ peripheral pulses throughout  Musculoskeletal: No joint pain, swelling or deformity; no limitation of movement  Back: Lower lumbar region with bruising and mass with fluctuance 13x5cm that is non tender to palpation.   Skin: No rashes      I&O's Detail    02 Aug 2020 07:01  -  03 Aug 2020 07:00  --------------------------------------------------------  IN:  Total IN: 0 mL    OUT:    Indwelling Catheter - Urethral: 1700 mL  Total OUT: 1700 mL    Total NET: -1700 mL      03 Aug 2020 07:01  -  04 Aug 2020 01:57  --------------------------------------------------------  IN:    dextrose 5% + lactated ringers.: 504 mL  Total IN: 504 mL    OUT:    Voided: 450 mL  Total OUT: 450 mL    Total NET: 54 mL          LABS:                        12.9   8.30  )-----------( 398      ( 03 Aug 2020 06:07 )             40.7     08-03    135  |  97<L>  |  12.0  ----------------------------<  98  3.3<L>   |  26.0  |  0.65    Ca    8.5<L>      03 Aug 2020 06:07  Phos  3.2     08-03  Mg     1.5     08-03            RADIOLOGY & ADDITIONAL STUDIES:
INTERVAL HPI/OVERNIGHT EVENTS:    Patient seen and evaluated at bedside and found hemodynamically stable and in no acute distress. Per nursing patient with  f/s in 60's, prior BMP from 3AM with serum glucose of 162, patient has not had any DM medication given since admission, on glipizide at home. Pain is well controlled. Denies fevers, chills, chest pain, SOB, coughing, dizziness, n/v/d, or generalized malaise.     SUBJECTIVE:    MEDICATIONS  (STANDING):  atorvastatin 80 milliGRAM(s) Oral at bedtime  dextrose 5%. 1000 milliLiter(s) (50 mL/Hr) IV Continuous <Continuous>  dextrose 5%. 1000 milliLiter(s) (50 mL/Hr) IV Continuous <Continuous>  dextrose 50% Injectable 12.5 Gram(s) IV Push once  dextrose 50% Injectable 25 Gram(s) IV Push once  dextrose 50% Injectable 25 Gram(s) IV Push once  diltiazem    milliGRAM(s) Oral daily  hydrochlorothiazide 25 milliGRAM(s) Oral daily  insulin lispro (HumaLOG) corrective regimen sliding scale   SubCutaneous three times a day before meals  ketorolac   Injectable 30 milliGRAM(s) IV Push once  losartan 100 milliGRAM(s) Oral daily  metoprolol tartrate 50 milliGRAM(s) Oral daily  nortriptyline 10 milliGRAM(s) Oral daily    MEDICATIONS  (PRN):  acetaminophen   Tablet .. 650 milliGRAM(s) Oral every 6 hours PRN Mild Pain (1 - 3)  dextrose 40% Gel 15 Gram(s) Oral once PRN Blood Glucose LESS THAN 70 milliGRAM(s)/deciliter  glucagon  Injectable 1 milliGRAM(s) IntraMuscular once PRN Glucose LESS THAN 70 milligrams/deciliter  traMADol 25 milliGRAM(s) Oral every 4 hours PRN Moderate Pain (4 - 6)  traMADol 50 milliGRAM(s) Oral every 4 hours PRN Severe Pain (7 - 10)      Vital Signs Last 24 Hrs  T(C): 36.7 (30 Jul 2020 08:28), Max: 37.2 (29 Jul 2020 23:33)  T(F): 98 (30 Jul 2020 08:28), Max: 99 (29 Jul 2020 23:33)  HR: 59 (30 Jul 2020 07:04) (59 - 96)  BP: 113/70 (30 Jul 2020 07:04) (61/40 - 133/63)  BP(mean): 71 (29 Jul 2020 22:43) (46 - 71)  RR: 20 (30 Jul 2020 08:28) (16 - 22)  SpO2: 98% (30 Jul 2020 08:28) (80% - 100%)    PHYSICAL EXAM:    General: lying in bed in no acute distress  HEENT: Neck supple  Chest: non-labored breathing or conversational dyspnea   Abdomen: non-distended, soft and depressible, non-tender. No guarding or rebound, non-peritonitic  Back: Lower lumbar region with bruising and mass with fluctuance approximately 15x6cm that is non tender to palpation.   : nielsen in place with clear yellow urine  Ext: no edema or cyanosis      I&O's Detail    29 Jul 2020 07:01  -  30 Jul 2020 07:00  --------------------------------------------------------  IN:    dextrose 5%.: 350 mL    Oral Fluid: 120 mL  Total IN: 470 mL    OUT:    Indwelling Catheter - Urethral: 1500 mL  Total OUT: 1500 mL    Total NET: -1030 mL      30 Jul 2020 07:01  -  30 Jul 2020 09:50  --------------------------------------------------------  IN:  Total IN: 0 mL    OUT:    Indwelling Catheter - Urethral: 450 mL  Total OUT: 450 mL    Total NET: -450 mL          LABS:                        13.8   7.36  )-----------( 387      ( 30 Jul 2020 01:52 )             43.3     07-30    138  |  104  |  47.0<H>  ----------------------------<  162<H>  3.7   |  18.0<L>  |  1.49<H>    Ca    8.7      30 Jul 2020 03:21    TPro  7.0  /  Alb  2.9<L>  /  TBili  0.4  /  DBili  x   /  AST  30  /  ALT  13  /  AlkPhos  91  07-29    PT/INR - ( 29 Jul 2020 22:40 )   PT: 19.7 sec;   INR: 1.74 ratio         PTT - ( 29 Jul 2020 22:40 )  PTT:36.8 sec
INTERVAL HPI/OVERNIGHT EVENTS:  Patient was seen and examined at bedside and was found to be resting comfortably and in no acute distress. Patient's diarrhea has now resolved after the imodium was given earlier yesterday. Patient's hematoma hasn't expanded significantly and we will continue to monitor. Patient's pain is well controlled and she denies any n/v, cp/sob, fevers and chills. Patient has a nielsen and had adequate urine output.      MEDICATIONS  (STANDING):  atorvastatin 80 milliGRAM(s) Oral at bedtime  ceFAZolin   IVPB      ceFAZolin   IVPB 2000 milliGRAM(s) IV Intermittent every 8 hours  dextrose 5% + lactated ringers. 1000 milliLiter(s) (84 mL/Hr) IV Continuous <Continuous>  dextrose 5%. 1000 milliLiter(s) (50 mL/Hr) IV Continuous <Continuous>  dextrose 50% Injectable 12.5 Gram(s) IV Push once  dextrose 50% Injectable 25 Gram(s) IV Push once  dextrose 50% Injectable 25 Gram(s) IV Push once  diltiazem    milliGRAM(s) Oral daily  enoxaparin Injectable 40 milliGRAM(s) SubCutaneous daily  hydrochlorothiazide 25 milliGRAM(s) Oral daily  insulin lispro (HumaLOG) corrective regimen sliding scale   SubCutaneous three times a day before meals  losartan 100 milliGRAM(s) Oral daily  metoprolol tartrate 50 milliGRAM(s) Oral daily  nortriptyline 10 milliGRAM(s) Oral daily  potassium phosphate / sodium phosphate Powder (PHOS-NaK) 1 Packet(s) Oral two times a day    MEDICATIONS  (PRN):  acetaminophen   Tablet .. 650 milliGRAM(s) Oral every 6 hours PRN Mild Pain (1 - 3)  dextrose 40% Gel 15 Gram(s) Oral once PRN Blood Glucose LESS THAN 70 milliGRAM(s)/deciliter  glucagon  Injectable 1 milliGRAM(s) IntraMuscular once PRN Glucose LESS THAN 70 milligrams/deciliter  traMADol 25 milliGRAM(s) Oral every 4 hours PRN Moderate Pain (4 - 6)  traMADol 50 milliGRAM(s) Oral every 4 hours PRN Severe Pain (7 - 10)      Vital Signs Last 24 Hrs  T(C): 36.9 (02 Aug 2020 23:45), Max: 37.3 (02 Aug 2020 20:05)  T(F): 98.5 (02 Aug 2020 23:45), Max: 99.1 (02 Aug 2020 20:05)  HR: 80 (02 Aug 2020 23:45) (80 - 94)  BP: 104/63 (02 Aug 2020 23:45) (104/63 - 124/66)  BP(mean): --  RR: 18 (02 Aug 2020 23:45) (17 - 18)  SpO2: 90% (02 Aug 2020 23:45) (90% - 91%)    Physical Exam:  Gen: NAD  Neurological:  No sensory/motor deficits  HEENT: PERRLA, EOMI  Respiratory: Breath Sounds equal & CTA bilaterally, no accessory muscle use  Cardiovascular: Regular rate & rhythm, normal S1, S2; no murmurs, gallops or rubs  Gastrointestinal: Soft, non-tender, nondistended  Vascular: Equal and normal pulses: 2+ peripheral pulses throughout  Musculoskeletal: No joint pain, swelling or deformity; no limitation of movement  Back: Lower lumbar region with bruising and mass with fluctuance 13x5cm that is non tender to palpation.   Skin: No rashes      I&O's Detail    01 Aug 2020 07:01  -  02 Aug 2020 07:00  --------------------------------------------------------  IN:    dextrose 5% + lactated ringers.: 840 mL    Solution: 50 mL  Total IN: 890 mL    OUT:    Indwelling Catheter - Urethral: 150 mL    Voided: 900 mL  Total OUT: 1050 mL    Total NET: -160 mL      02 Aug 2020 07:01  -  03 Aug 2020 02:08  --------------------------------------------------------  IN:  Total IN: 0 mL    OUT:    Indwelling Catheter - Urethral: 1300 mL  Total OUT: 1300 mL    Total NET: -1300 mL          LABS:                        12.3   7.01  )-----------( 369      ( 02 Aug 2020 05:03 )             40.0     08-02    137  |  102  |  12.0  ----------------------------<  93  4.4   |  24.0  |  0.61    Ca    8.4<L>      02 Aug 2020 05:03  Phos  2.1     08-02  Mg     1.7     08-02    TPro  5.9<L>  /  Alb  2.6<L>  /  TBili  0.4  /  DBili  x   /  AST  16  /  ALT  12  /  AlkPhos  94  08-01      Assessment:  Patient is a 68 yo F who is s/p GLF resulting in a lumbar hematoma, mesenteric contusion whose hospital course has been complicated by UTI and bacteremia.    Plan:  - f/u BCx,  - daily BCx until negative  - monitor for hematoma expansion  - continue IV ABx  - f/u Hgb  - f/u PT
Progress Note     Subjective: Patient volume status was lagging. LR was given through Right Central line for optimization of fluid status. Will reevaluate V/S response for further management.         MEDICATIONS  (STANDING):  atorvastatin 80 milliGRAM(s) Oral at bedtime  cefTRIAXone   IVPB 1000 milliGRAM(s) IV Intermittent every 24 hours  cefTRIAXone   IVPB      dextrose 5% + lactated ringers. 1000 milliLiter(s) (84 mL/Hr) IV Continuous <Continuous>  dextrose 5%. 1000 milliLiter(s) (50 mL/Hr) IV Continuous <Continuous>  dextrose 50% Injectable 12.5 Gram(s) IV Push once  dextrose 50% Injectable 25 Gram(s) IV Push once  dextrose 50% Injectable 25 Gram(s) IV Push once  diltiazem    milliGRAM(s) Oral daily  enoxaparin Injectable 40 milliGRAM(s) SubCutaneous daily  hydrochlorothiazide 25 milliGRAM(s) Oral daily  insulin lispro (HumaLOG) corrective regimen sliding scale   SubCutaneous three times a day before meals  loperamide 2 milliGRAM(s) Oral once  losartan 100 milliGRAM(s) Oral daily  metoprolol tartrate 50 milliGRAM(s) Oral daily  nortriptyline 10 milliGRAM(s) Oral daily    MEDICATIONS  (PRN):  acetaminophen   Tablet .. 650 milliGRAM(s) Oral every 6 hours PRN Mild Pain (1 - 3)  dextrose 40% Gel 15 Gram(s) Oral once PRN Blood Glucose LESS THAN 70 milliGRAM(s)/deciliter  glucagon  Injectable 1 milliGRAM(s) IntraMuscular once PRN Glucose LESS THAN 70 milligrams/deciliter  traMADol 25 milliGRAM(s) Oral every 4 hours PRN Moderate Pain (4 - 6)  traMADol 50 milliGRAM(s) Oral every 4 hours PRN Severe Pain (7 - 10)      Vital Signs:  Vital Signs Last 24 Hrs  T(C): 36.6 (02 Aug 2020 08:13), Max: 36.9 (01 Aug 2020 17:08)  T(F): 97.8 (02 Aug 2020 08:13), Max: 98.5 (01 Aug 2020 17:08)  HR: 94 (02 Aug 2020 08:13) (88 - 97)  BP: 119/76 (02 Aug 2020 08:13) (119/76 - 133/75)  BP(mean): --  RR: 18 (02 Aug 2020 08:13) (17 - 18)  SpO2: 91% (02 Aug 2020 08:13) (91% - 93%)    CAPILLARY BLOOD GLUCOSE      POCT Blood Glucose.: 101 mg/dL (02 Aug 2020 08:23)  POCT Blood Glucose.: 110 mg/dL (01 Aug 2020 21:30)  POCT Blood Glucose.: 86 mg/dL (01 Aug 2020 18:18)  POCT Blood Glucose.: 119 mg/dL (01 Aug 2020 12:50)      I&O's Detail    01 Aug 2020 07:01  -  02 Aug 2020 07:00  --------------------------------------------------------  IN:    dextrose 5% + lactated ringers.: 840 mL    Solution: 50 mL  Total IN: 890 mL    OUT:    Indwelling Catheter - Urethral: 150 mL    Voided: 900 mL  Total OUT: 1050 mL    Total NET: -160 mL    Physical Exam:  General: alert, oriented x 3 in no acute distress   CV: normal S1/S2, RRR  Pulmonary: clear to auscultation, symmetric chest movement   Abdomen: soft depressible, non-tender, non-distended   EXT: Back: some tenderness on lumbar region at palpation     Labs:    08-02    137  |  102  |  12.0  ----------------------------<  93  4.4   |  24.0  |  0.61    Ca    8.4<L>      02 Aug 2020 05:03  Phos  2.1     08-02  Mg     1.7     08-02    TPro  5.9<L>  /  Alb  2.6<L>  /  TBili  0.4  /  DBili  x   /  AST  16  /  ALT  12  /  AlkPhos  94  08-01    LIVER FUNCTIONS - ( 01 Aug 2020 05:17 )  Alb: 2.6 g/dL / Pro: 5.9 g/dL / ALK PHOS: 94 U/L / ALT: 12 U/L / AST: 16 U/L / GGT: x                                 12.3   7.01  )-----------( 369      ( 02 Aug 2020 05:03 )             40.0
SUBJECTIVE/24 hour events:  Patient is a 69yFemale no acute events overnight. Blood culture negative on 8/1, off antibiotics, eliquis restarted. Awaiting PT re-evaluation for safety for discharge to home.       Vital Signs Last 24 Hrs  T(C): 37.1 (04 Aug 2020 21:55), Max: 37.2 (04 Aug 2020 04:59)  T(F): 98.7 (04 Aug 2020 21:55), Max: 98.9 (04 Aug 2020 04:59)  HR: 92 (04 Aug 2020 21:55) (72 - 98)  BP: 108/69 (04 Aug 2020 21:55) (108/69 - 124/66)  BP(mean): --  RR: 18 (04 Aug 2020 21:55) (18 - 18)  SpO2: 95% (04 Aug 2020 21:55) (90% - 98%)  Drug Dosing Weight  Height (cm): 170.18 (29 Jul 2020 14:59)  Weight (kg): 72.6 (29 Jul 2020 14:59)  BMI (kg/m2): 25.1 (29 Jul 2020 14:59)  BSA (m2): 1.84 (29 Jul 2020 14:59)  I&O's Detail    03 Aug 2020 07:01  -  04 Aug 2020 07:00  --------------------------------------------------------  IN:    dextrose 5% + lactated ringers.: 924 mL  Total IN: 924 mL    OUT:    Voided: 700 mL  Total OUT: 700 mL    Total NET: 224 mL      04 Aug 2020 07:01  -  05 Aug 2020 02:34  --------------------------------------------------------  IN:    Oral Fluid: 360 mL  Total IN: 360 mL    OUT:    Voided: 250 mL  Total OUT: 250 mL    Total NET: 110 mL        Allergies    No Known Allergies    Intolerances                              12.9   8.30  )-----------( 398      ( 03 Aug 2020 06:07 )             40.7   08-04    133<L>  |  96<L>  |  10.0  ----------------------------<  101<H>  3.6   |  23.0  |  0.62    Ca    8.4<L>      04 Aug 2020 08:52  Phos  3.2     08-03  Mg     1.7     08-04        ROS:    PHYSICAL EXAM:  Constitutional: in no distress    Respiratory: no respiratory distress, no conversational dyspnea , no supplemental o2     Cardiovascular: NSR on telemetry     Gastrointestinal: abdomen soft, non-tender, atraumatic     Genitourinary: voiding spontaneously     Extremities: atraumatic     Neurological: A&OX3    Skin: warm, dry and no rashes         MEDICATIONS  (STANDING):  apixaban 5 milliGRAM(s) Oral every 12 hours  atorvastatin 80 milliGRAM(s) Oral at bedtime  dextrose 50% Injectable 12.5 Gram(s) IV Push once  dextrose 50% Injectable 25 Gram(s) IV Push once  dextrose 50% Injectable 25 Gram(s) IV Push once  diltiazem    milliGRAM(s) Oral daily  hydrochlorothiazide 25 milliGRAM(s) Oral daily  insulin lispro (HumaLOG) corrective regimen sliding scale   SubCutaneous three times a day before meals  losartan 100 milliGRAM(s) Oral daily  metoprolol tartrate 50 milliGRAM(s) Oral daily  nortriptyline 10 milliGRAM(s) Oral daily    MEDICATIONS  (PRN):  acetaminophen   Tablet .. 650 milliGRAM(s) Oral every 6 hours PRN Mild Pain (1 - 3)  dextrose 40% Gel 15 Gram(s) Oral once PRN Blood Glucose LESS THAN 70 milliGRAM(s)/deciliter  glucagon  Injectable 1 milliGRAM(s) IntraMuscular once PRN Glucose LESS THAN 70 milligrams/deciliter  traMADol 25 milliGRAM(s) Oral every 4 hours PRN Moderate Pain (4 - 6)  traMADol 50 milliGRAM(s) Oral every 4 hours PRN Severe Pain (7 - 10)      RADIOLOGY STUDIES:    CULTURES:

## 2020-08-05 NOTE — PHYSICAL THERAPY INITIAL EVALUATION ADULT - MANUAL MUSCLE TESTING RESULTS, REHAB EVAL
except duy hips 4-/5/no strength deficits were identified
bilateral UE grossly 4/5; bilateral LE grossly 3+/5

## 2020-08-05 NOTE — PHYSICAL THERAPY INITIAL EVALUATION ADULT - ADDITIONAL COMMENTS
owns a RW but does not use it, 2 steps 2 rails to enter home, she does not need to negotiate stairs within home
Patient lives alone, was independent prior to admission, owns a RW but does not use it, 2 steps 2 rails to enter home, she does not need to negotiate stairs within home

## 2020-08-05 NOTE — PHYSICAL THERAPY INITIAL EVALUATION ADULT - DIAGNOSIS, PT EVAL
pt modified I c all functional mobility, will not follow
Patient with some LE weakness, but able to maintain functional independence.

## 2020-08-06 LAB
CULTURE RESULTS: SIGNIFICANT CHANGE UP
ORGANISM # SPEC MICROSCOPIC CNT: SIGNIFICANT CHANGE UP
SPECIMEN SOURCE: SIGNIFICANT CHANGE UP

## 2020-08-08 LAB
CULTURE RESULTS: SIGNIFICANT CHANGE UP
SPECIMEN SOURCE: SIGNIFICANT CHANGE UP

## 2020-08-10 PROBLEM — I10 ESSENTIAL (PRIMARY) HYPERTENSION: Chronic | Status: ACTIVE | Noted: 2020-07-29

## 2020-08-10 PROBLEM — E11.9 TYPE 2 DIABETES MELLITUS WITHOUT COMPLICATIONS: Chronic | Status: ACTIVE | Noted: 2020-07-29

## 2020-08-10 PROBLEM — I48.91 UNSPECIFIED ATRIAL FIBRILLATION: Chronic | Status: ACTIVE | Noted: 2020-07-29

## 2020-08-12 ENCOUNTER — APPOINTMENT (OUTPATIENT)
Dept: FAMILY MEDICINE | Facility: CLINIC | Age: 70
End: 2020-08-12

## 2020-09-17 ENCOUNTER — RX RENEWAL (OUTPATIENT)
Age: 70
End: 2020-09-17

## 2020-09-21 ENCOUNTER — RX RENEWAL (OUTPATIENT)
Age: 70
End: 2020-09-21

## 2020-09-30 LAB
C DIFF TOX B STL QL CT TISS CULT: ABNORMAL
Lab: ABNORMAL

## 2020-10-06 ENCOUNTER — APPOINTMENT (OUTPATIENT)
Dept: FAMILY MEDICINE | Facility: CLINIC | Age: 70
End: 2020-10-06
Payer: MEDICARE

## 2020-10-06 VITALS
HEIGHT: 67 IN | HEART RATE: 135 BPM | DIASTOLIC BLOOD PRESSURE: 79 MMHG | TEMPERATURE: 95.6 F | BODY MASS INDEX: 21.35 KG/M2 | WEIGHT: 136 LBS | RESPIRATION RATE: 16 BRPM | SYSTOLIC BLOOD PRESSURE: 118 MMHG

## 2020-10-06 DIAGNOSIS — Z71.89 OTHER SPECIFIED COUNSELING: ICD-10-CM

## 2020-10-06 PROCEDURE — 90686 IIV4 VACC NO PRSV 0.5 ML IM: CPT

## 2020-10-06 PROCEDURE — G0008: CPT

## 2020-10-06 PROCEDURE — 99215 OFFICE O/P EST HI 40 MIN: CPT | Mod: 25

## 2020-12-21 PROBLEM — N39.0 URINARY TRACT INFECTION WITHOUT HEMATURIA, SITE UNSPECIFIED: Status: RESOLVED | Noted: 2019-03-20 | Resolved: 2020-12-21

## 2020-12-23 ENCOUNTER — RX RENEWAL (OUTPATIENT)
Age: 70
End: 2020-12-23

## 2020-12-23 PROBLEM — Z87.09 HISTORY OF ACUTE PHARYNGITIS: Status: RESOLVED | Noted: 2020-06-08 | Resolved: 2020-12-23

## 2021-01-14 ENCOUNTER — APPOINTMENT (OUTPATIENT)
Dept: FAMILY MEDICINE | Facility: CLINIC | Age: 71
End: 2021-01-14
Payer: MEDICARE

## 2021-01-14 PROCEDURE — 99443: CPT | Mod: 95

## 2021-01-18 ENCOUNTER — LABORATORY RESULT (OUTPATIENT)
Age: 71
End: 2021-01-18

## 2021-01-27 ENCOUNTER — APPOINTMENT (OUTPATIENT)
Dept: FAMILY MEDICINE | Facility: CLINIC | Age: 71
End: 2021-01-27
Payer: MEDICARE

## 2021-01-27 VITALS
BODY MASS INDEX: 21.66 KG/M2 | DIASTOLIC BLOOD PRESSURE: 69 MMHG | HEART RATE: 78 BPM | TEMPERATURE: 97.5 F | RESPIRATION RATE: 16 BRPM | OXYGEN SATURATION: 98 % | HEIGHT: 67 IN | SYSTOLIC BLOOD PRESSURE: 118 MMHG | WEIGHT: 138 LBS

## 2021-01-27 VITALS — TEMPERATURE: 97.6 F

## 2021-01-27 DIAGNOSIS — E53.8 DEFICIENCY OF OTHER SPECIFIED B GROUP VITAMINS: ICD-10-CM

## 2021-01-27 LAB
25(OH)D3 SERPL-MCNC: 27.5 NG/ML
ALBUMIN SERPL ELPH-MCNC: 4.1 G/DL
ALP BLD-CCNC: 80 U/L
ALT SERPL-CCNC: 25 U/L
ANA PAT FLD IF-IMP: ABNORMAL
ANA SER IF-ACNC: ABNORMAL
ANION GAP SERPL CALC-SCNC: 15 MMOL/L
APPEARANCE: CLEAR
AST SERPL-CCNC: 25 U/L
BASOPHILS # BLD AUTO: 0.05 K/UL
BASOPHILS NFR BLD AUTO: 0.5 %
BILIRUB SERPL-MCNC: 0.2 MG/DL
BILIRUBIN URINE: NEGATIVE
BLOOD URINE: NEGATIVE
BUN SERPL-MCNC: 18 MG/DL
CALCIUM SERPL-MCNC: 10.1 MG/DL
CHLORIDE SERPL-SCNC: 104 MMOL/L
CHOLEST SERPL-MCNC: 109 MG/DL
CO2 SERPL-SCNC: 22 MMOL/L
COLOR: NORMAL
CREAT SERPL-MCNC: 0.84 MG/DL
CREAT SPEC-SCNC: 82 MG/DL
EOSINOPHIL # BLD AUTO: 0.16 K/UL
EOSINOPHIL NFR BLD AUTO: 1.7 %
ERYTHROCYTE [SEDIMENTATION RATE] IN BLOOD BY WESTERGREN METHOD: 40 MM/HR
ESTIMATED AVERAGE GLUCOSE: 114 MG/DL
FERRITIN SERPL-MCNC: 54 NG/ML
FOLATE SERPL-MCNC: >20 NG/ML
GLUCOSE QUALITATIVE U: NEGATIVE
GLUCOSE SERPL-MCNC: 107 MG/DL
HBA1C MFR BLD HPLC: 5.6 %
HCT VFR BLD CALC: 36.7 %
HDLC SERPL-MCNC: 35 MG/DL
HGB BLD-MCNC: 11.2 G/DL
IMM GRANULOCYTES NFR BLD AUTO: 0.4 %
KETONES URINE: NEGATIVE
LDLC SERPL CALC-MCNC: 27 MG/DL
LEUKOCYTE ESTERASE URINE: NEGATIVE
LYMPHOCYTES # BLD AUTO: 2.44 K/UL
LYMPHOCYTES NFR BLD AUTO: 25.4 %
MAN DIFF?: NORMAL
MCHC RBC-ENTMCNC: 29.1 PG
MCHC RBC-ENTMCNC: 30.5 GM/DL
MCV RBC AUTO: 95.3 FL
MICROALBUMIN 24H UR DL<=1MG/L-MCNC: 8.4 MG/DL
MICROALBUMIN/CREAT 24H UR-RTO: 102 MG/G
MONOCYTES # BLD AUTO: 0.61 K/UL
MONOCYTES NFR BLD AUTO: 6.4 %
NEUTROPHILS # BLD AUTO: 6.29 K/UL
NEUTROPHILS NFR BLD AUTO: 65.6 %
NITRITE URINE: NEGATIVE
NONHDLC SERPL-MCNC: 74 MG/DL
PH URINE: 6
PLATELET # BLD AUTO: 365 K/UL
POTASSIUM SERPL-SCNC: 3.7 MMOL/L
PROT SERPL-MCNC: 7 G/DL
PROTEIN URINE: ABNORMAL
RBC # BLD: 3.85 M/UL
RBC # FLD: 13.2 %
RHEUMATOID FACT SER QL: <10 IU/ML
SODIUM SERPL-SCNC: 141 MMOL/L
SPECIFIC GRAVITY URINE: 1.02
T3RU NFR SERPL: 1 TBI
T4 FREE SERPL-MCNC: 1.2 NG/DL
TRIGL SERPL-MCNC: 235 MG/DL
TSH SERPL-ACNC: 1.77 UIU/ML
UROBILINOGEN URINE: NORMAL
VIT B12 SERPL-MCNC: 198 PG/ML
WBC # FLD AUTO: 9.59 K/UL

## 2021-01-27 PROCEDURE — 99214 OFFICE O/P EST MOD 30 MIN: CPT | Mod: 25

## 2021-01-27 PROCEDURE — G0444 DEPRESSION SCREEN ANNUAL: CPT | Mod: 59

## 2021-01-27 PROCEDURE — 96372 THER/PROPH/DIAG INJ SC/IM: CPT

## 2021-01-27 RX ORDER — GLIPIZIDE 5 MG/1
5 TABLET, FILM COATED, EXTENDED RELEASE ORAL DAILY
Qty: 90 | Refills: 3 | Status: DISCONTINUED | COMMUNITY
Start: 2018-01-08 | End: 2021-01-27

## 2021-01-27 RX ORDER — NORTRIPTYLINE HYDROCHLORIDE 10 MG/1
10 CAPSULE ORAL
Qty: 180 | Refills: 3 | Status: DISCONTINUED | COMMUNITY
Start: 2020-04-23 | End: 2021-01-27

## 2021-01-27 RX ORDER — GLIPIZIDE 5 MG/1
5 TABLET ORAL
Refills: 0 | Status: DISCONTINUED | COMMUNITY
End: 2021-01-27

## 2021-01-27 RX ORDER — TRAMADOL HYDROCHLORIDE 50 MG/1
50 TABLET, COATED ORAL
Qty: 60 | Refills: 0 | Status: DISCONTINUED | COMMUNITY
Start: 2020-08-07 | End: 2021-01-27

## 2021-01-27 RX ORDER — COLCHICINE 0.6 MG/1
0.6 CAPSULE ORAL
Qty: 15 | Refills: 1 | Status: DISCONTINUED | COMMUNITY
Start: 2020-06-25 | End: 2021-01-27

## 2021-01-27 RX ORDER — DULOXETINE HYDROCHLORIDE 60 MG/1
60 CAPSULE, DELAYED RELEASE PELLETS ORAL
Qty: 90 | Refills: 2 | Status: DISCONTINUED | COMMUNITY
Start: 2020-03-20 | End: 2021-01-27

## 2021-01-27 RX ORDER — COLCHICINE 0.6 MG/1
0.6 CAPSULE ORAL
Qty: 0 | Refills: 0 | Status: DISCONTINUED | COMMUNITY
Start: 2020-06-26 | End: 2021-01-27

## 2021-01-27 RX ORDER — COLCHICINE 0.6 MG/1
0.6 TABLET, FILM COATED ORAL
Qty: 15 | Refills: 1 | Status: DISCONTINUED | COMMUNITY
Start: 2020-06-29 | End: 2021-01-27

## 2021-01-27 RX ORDER — WARFARIN 3 MG/1
3 TABLET ORAL
Qty: 90 | Refills: 3 | Status: DISCONTINUED | COMMUNITY
Start: 2020-06-05 | End: 2021-01-27

## 2021-01-30 RX ORDER — CYANOCOBALAMIN 1000 UG/ML
1000 INJECTION INTRAMUSCULAR; SUBCUTANEOUS
Qty: 0 | Refills: 0 | Status: COMPLETED | OUTPATIENT
Start: 2021-01-30

## 2021-01-30 RX ADMIN — CYANOCOBALAMIN 0 MCG/ML: 1000 INJECTION INTRAMUSCULAR; SUBCUTANEOUS at 00:00

## 2021-02-12 ENCOUNTER — APPOINTMENT (OUTPATIENT)
Dept: RHEUMATOLOGY | Facility: CLINIC | Age: 71
End: 2021-02-12
Payer: MEDICARE

## 2021-02-12 VITALS
BODY MASS INDEX: 22.18 KG/M2 | WEIGHT: 138 LBS | SYSTOLIC BLOOD PRESSURE: 146 MMHG | DIASTOLIC BLOOD PRESSURE: 75 MMHG | HEIGHT: 66 IN | OXYGEN SATURATION: 98 % | HEART RATE: 81 BPM

## 2021-02-12 DIAGNOSIS — R20.2 PARESTHESIA OF SKIN: ICD-10-CM

## 2021-02-12 DIAGNOSIS — L65.9 NONSCARRING HAIR LOSS, UNSPECIFIED: ICD-10-CM

## 2021-02-12 PROCEDURE — 99215 OFFICE O/P EST HI 40 MIN: CPT

## 2021-03-19 ENCOUNTER — RX RENEWAL (OUTPATIENT)
Age: 71
End: 2021-03-19

## 2021-04-12 ENCOUNTER — APPOINTMENT (OUTPATIENT)
Dept: RHEUMATOLOGY | Facility: CLINIC | Age: 71
End: 2021-04-12
Payer: MEDICARE

## 2021-04-12 VITALS
HEIGHT: 66 IN | HEART RATE: 80 BPM | DIASTOLIC BLOOD PRESSURE: 63 MMHG | TEMPERATURE: 97.8 F | BODY MASS INDEX: 23.3 KG/M2 | OXYGEN SATURATION: 98 % | SYSTOLIC BLOOD PRESSURE: 153 MMHG | WEIGHT: 145 LBS

## 2021-04-12 DIAGNOSIS — M79.642 PAIN IN LEFT HAND: ICD-10-CM

## 2021-04-12 DIAGNOSIS — Z87.39 PERSONAL HISTORY OF OTHER DISEASES OF THE MUSCULOSKELETAL SYSTEM AND CONNECTIVE TISSUE: ICD-10-CM

## 2021-04-12 DIAGNOSIS — E83.59 OTHER DISORDERS OF CALCIUM METABOLISM: ICD-10-CM

## 2021-04-12 DIAGNOSIS — M79.641 PAIN IN RIGHT HAND: ICD-10-CM

## 2021-04-12 PROCEDURE — 99215 OFFICE O/P EST HI 40 MIN: CPT

## 2021-04-12 RX ORDER — DILTIAZEM HYDROCHLORIDE 180 MG/1
180 CAPSULE, EXTENDED RELEASE ORAL
Qty: 90 | Refills: 3 | Status: ACTIVE | COMMUNITY
Start: 2019-03-05

## 2021-04-12 NOTE — CONSULT LETTER
[Dear  ___] : Dear  [unfilled], [Courtesy Letter:] : I had the pleasure of seeing your patient, [unfilled], in my office today. [Please see my note below.] : Please see my note below. [Consult Closing:] : Thank you very much for allowing me to participate in the care of this patient.  If you have any questions, please do not hesitate to contact me. [Sincerely,] : Sincerely, [FreeTextEntry3] : Reinier Ortiz II, MD\par \par Attending Rheumatologist\par Division of Rheumatology\par [facility]\par     [facility] &\par     Rheumatology at [place],\par     [facility]\par \par \par [facility]\par \par Contact:\par    [phone], fax [phone] ([place] office)\par    [phone], fax [phone] ([place] office)\par

## 2021-04-12 NOTE — CONSULT LETTER
[Dear  ___] : Dear  [unfilled], [Courtesy Letter:] : I had the pleasure of seeing your patient, [unfilled], in my office today. [Please see my note below.] : Please see my note below. [Consult Closing:] : Thank you very much for allowing me to participate in the care of this patient.  If you have any questions, please do not hesitate to contact me. [Sincerely,] : Sincerely, [FreeTextEntry3] : Reinier Ortiz II, MD\par \par Attending Rheumatologist\par Division of Rheumatology\par [facility] / [facility]\par     [facility] &\par     Rheumatology at [place],\par     [facility]\par \par \par [facility]\par \par Contact:\par    [phone], fax [phone] ([place] office)\par    [phone], fax [phone] ([place] office)\par

## 2021-04-12 NOTE — DATA REVIEWED
[FreeTextEntry1] : +Scl70\par \par CT Chest - increased size of nodule, previous negative PET CT. Planned repeat\par \par PFT: mild obstructive pattern with DLCO defect, no improvement w/ bronchodilator. Reviewed.\par

## 2021-04-12 NOTE — CONSULT LETTER
[Dear  ___] : Dear  [unfilled], [Courtesy Letter:] : I had the pleasure of seeing your patient, [unfilled], in my office today. [Please see my note below.] : Please see my note below. [Consult Closing:] : Thank you very much for allowing me to participate in the care of this patient.  If you have any questions, please do not hesitate to contact me. [Sincerely,] : Sincerely, [FreeTextEntry3] : Reinier Ortiz II, MD\par \par Attending Rheumatologist\par Division of Rheumatology\par [facility]\par     [facility] &\par     Rheumatology at [place],\par     [facility]\par \par \par [facility] at [facility]\par \par Contact:\par    [phone], fax [phone] ([place] office)\par    [phone], fax [phone] ([place] office)\par

## 2021-04-12 NOTE — HISTORY OF PRESENT ILLNESS
[___ Month(s) Ago] : [unfilled] month(s) ago [FreeTextEntry1] : APAP not effective, on duloxetine and APAP prn\par Tried meleluca and CBD oil w/o much benefits. Was taking formula w/o vit K ( joint formulation), however also expensive so won't continue\par Hand thickening persists [de-identified] : \par \par All other ROS negative except as mentioned in HPI.

## 2021-04-12 NOTE — ASSESSMENT
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  Previous PET CT negative w/u and needs repeat. Nodules may be 2/2 scleroderma pulmonary process vs malignancy - patient needs further repeat PET and continued screening. There are no active features of ILD on her imaging. She has a history of Afib s/p ablation remaining on anticoagulation, previous echocardiogram without significant PAH findings (20-25 mmHg). She has degenerative OA affecting her peripheral and axial joints. She has no evidence of inflammatory arthritis. Overall her scleroderma does not appear to have any significant cardiopulmonary involvement which would require immunosuppression. If any changes in PFTs or evidence of interstitial process, may recommend early use of Ofev prior to immunosuppression.\par S/P RMSF episode treatment w/ doxycycline, no active infection\par Stopped taking duloxetine. Remains on APAP prn and supplements\par BMD- used to be done by GYN. no longer follows. No DEXA in > 2 yearse\par \par - c/w supportive measures for OA treatment including APAP for mild-moderate pain and sparing use of NSAIDs for moderate-severe pain.\par - pulmonary f/u, PET CT\par - DEXA\par - c/w HCQ 200mg BID\par - topical/conservative measures for calcinosis, isolated mild calcified nodules SQ/dermal in forearms/hands- no medical treatment exists systemically for those (only severe calcinosis w/ skin ulceration, frequently seen in severe types of DM/PM).\par \par RTO 3-4 months

## 2021-04-12 NOTE — HISTORY OF PRESENT ILLNESS
[___ Month(s) Ago] : [unfilled] month(s) ago [FreeTextEntry1] : Tried meleluca and CBD oil w/o much benefits. Was taking formula w/o vit K ( joint formulation), however also expensive so won't continue\par on HCQ 200mg BID\par MIRYAM prn\par Hand thickening persists. Notes some SQ nodules tender around MCPs L hand and R forearm, calcinosis\par Pending PET/CT\par Last TTE 1/2021 w/ mild PAH [Anti-SCL70] : Anti-SCL70 [Raynaud's] : raynaud's [Telangiectasia] : no telangiectasia [Raynaud's:] : raynaud's [Digital Ulcers:] : no digital ulcers [Digital Pits] : no digital pits [Calcinosis] : calcinosis [Skin Tightening] : skin tightening [Diffuse] : diffuse [Inflammatory Arthritis] : inflammatory arthritis [Sclerodactyly] : sclerodactyly [Tendon friction rubs] : no tendon friction rubs [Myositis] : no myositis [Interstitial Lung Disease] : no interstitial lung disease [Pulmonary Hypertension] : pulmonary hypertension [Stable] : stable [GERD] : GERD [Dysmotility] : no dysmotility [Gastroparesis] : no gastroparesis [GAVE] : no gastric antral vascular ectasia [H/O Scleroderma Renal Crisis] : no history of scleroderma renal crisis [TextBox_2] : 2019 [TextBox_43] : 2018 [TextBox_30] : 1/2021 [TextBox_38] : HCQ [de-identified] : \par \par All other ROS negative except as mentioned in HPI.

## 2021-04-12 NOTE — HISTORY OF PRESENT ILLNESS
[___ Month(s) Ago] : [unfilled] month(s) ago [FreeTextEntry1] : Tried meleluca and CBD oil w/o much benefits. Was taking formula w/o vit K ( joint formulation), however also expensive so won't continue\par on HCQ 200mg BID\par MIRYAM prn\par Hand thickening persists. Notes some SQ nodules tender around MCPs L hand and R forearm, calcinosis\par Pending PET/CT\par Last TTE 1/2021 w/ mild PAH [Anti-SCL70] : Anti-SCL70 [Raynaud's] : raynaud's [Telangiectasia] : no telangiectasia [Raynaud's:] : raynaud's [Digital Ulcers:] : no digital ulcers [Digital Pits] : no digital pits [Calcinosis] : calcinosis [Skin Tightening] : skin tightening [Diffuse] : diffuse [Inflammatory Arthritis] : inflammatory arthritis [Sclerodactyly] : sclerodactyly [Tendon friction rubs] : no tendon friction rubs [Myositis] : no myositis [Interstitial Lung Disease] : no interstitial lung disease [Pulmonary Hypertension] : pulmonary hypertension [Stable] : stable [GERD] : GERD [Dysmotility] : no dysmotility [Gastroparesis] : no gastroparesis [GAVE] : no gastric antral vascular ectasia [H/O Scleroderma Renal Crisis] : no history of scleroderma renal crisis [TextBox_2] : 2019 [TextBox_43] : 2018 [TextBox_30] : 1/2021 [TextBox_38] : HCQ [de-identified] : \par \par All other ROS negative except as mentioned in HPI.

## 2021-04-12 NOTE — ASSESSMENT
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  Previous PET CT negative w/u and needs repeat. Nodules may be 2/2 scleroderma pulmonary process vs malignancy - patient needs further repeat PET and continued screening. There are no active features of ILD on her imaging. She has a history of Afib s/p ablation remaining on anticoagulation, previous echocardiogram without significant PAH findings (20-25 mmHg). She has degenerative OA affecting her peripheral and axial joints. She has no evidence of inflammatory arthritis. Overall her scleroderma does not appear to have any significant cardiopulmonary involvement which would require immunosuppression. If any changes in PFTs or evidence of interstitial process, may recommend early use of Ofev prior to immunosuppression.\par S/P RMSF episode treatment w/ doxycycline, no active infection\par \par - c/w supportive measures for OA treatment including APAP for mild-moderate pain and sparing use of NSAIDs for moderate-severe pain.\par - pulmonary f/u, PET CT\par \par RTO 3 months

## 2021-04-12 NOTE — PHYSICAL EXAM
[General Appearance - Alert] : alert [General Appearance - In No Acute Distress] : in no acute distress [General Appearance - Well Nourished] : well nourished [General Appearance - Well Developed] : well developed [General Appearance - Well-Appearing] : healthy appearing [Sclera] : the sclera and conjunctiva were normal [PERRL With Normal Accommodation] : pupils were equal in size, round, and reactive to light [Extraocular Movements] : extraocular movements were intact [Outer Ear] : the ears and nose were normal in appearance [Oropharynx] : the oropharynx was normal [Neck Appearance] : the appearance of the neck was normal [Neck Cervical Mass (___cm)] : no neck mass was observed [Jugular Venous Distention Increased] : there was no jugular-venous distention [Thyroid Diffuse Enlargement] : the thyroid was not enlarged [Thyroid Nodule] : there were no palpable thyroid nodules [Respiration, Rhythm And Depth] : normal respiratory rhythm and effort [Auscultation Breath Sounds / Voice Sounds] : lungs were clear to auscultation bilaterally [Heart Rate And Rhythm] : heart rate was normal and rhythm regular [Heart Sounds] : normal S1 and S2 [Heart Sounds Gallop] : no gallops [Murmurs] : no murmurs [Heart Sounds Pericardial Friction Rub] : no pericardial rub [Full Pulse] : the pedal pulses are present [Edema] : there was no peripheral edema [Bowel Sounds] : normal bowel sounds [Abdomen Soft] : soft [Abdomen Tenderness] : non-tender [Abdomen Mass (___ Cm)] : no abdominal mass palpated [Cervical Lymph Nodes Enlarged Posterior Bilaterally] : posterior cervical [Cervical Lymph Nodes Enlarged Anterior Bilaterally] : anterior cervical [Supraclavicular Lymph Nodes Enlarged Bilaterally] : supraclavicular [Axillary Lymph Nodes Enlarged Bilaterally] : axillary [No CVA Tenderness] : no ~M costovertebral angle tenderness [No Spinal Tenderness] : no spinal tenderness [Abnormal Walk] : normal gait [Nail Clubbing] : no clubbing  or cyanosis of the fingernails [Musculoskeletal - Swelling] : no joint swelling seen [Motor Tone] : muscle strength and tone were normal [Skin Turgor] : normal skin turgor [] : no rash [Deep Tendon Reflexes (DTR)] : deep tendon reflexes were 2+ and symmetric [Sensation] : the sensory exam was normal to light touch and pinprick [Motor Exam] : the motor exam was normal [No Focal Deficits] : no focal deficits [Affect] : the affect was normal [Mood] : the mood was normal [FreeTextEntry1] : Sclerodactyly, Raynaud's.  Digital swelling B/L, most signficantly on L #1

## 2021-05-07 ENCOUNTER — RX RENEWAL (OUTPATIENT)
Age: 71
End: 2021-05-07

## 2021-05-20 ENCOUNTER — APPOINTMENT (OUTPATIENT)
Dept: FAMILY MEDICINE | Facility: CLINIC | Age: 71
End: 2021-05-20
Payer: MEDICARE

## 2021-05-20 DIAGNOSIS — R19.7 DIARRHEA, UNSPECIFIED: ICD-10-CM

## 2021-05-20 PROCEDURE — 99442: CPT | Mod: 95

## 2021-05-20 RX ORDER — HYOSCYAMINE SULFATE 0.38 MG/1
0.38 TABLET, EXTENDED RELEASE ORAL
Qty: 14 | Refills: 0 | Status: DISCONTINUED | COMMUNITY
Start: 2021-05-19 | End: 2021-05-20

## 2021-05-22 PROBLEM — R19.7 DIARRHEA, UNSPECIFIED TYPE: Status: ACTIVE | Noted: 2021-05-19

## 2021-05-24 ENCOUNTER — NON-APPOINTMENT (OUTPATIENT)
Age: 71
End: 2021-05-24

## 2021-07-22 ENCOUNTER — APPOINTMENT (OUTPATIENT)
Dept: RHEUMATOLOGY | Facility: CLINIC | Age: 71
End: 2021-07-22
Payer: MEDICARE

## 2021-07-22 ENCOUNTER — LABORATORY RESULT (OUTPATIENT)
Age: 71
End: 2021-07-22

## 2021-07-22 VITALS
HEIGHT: 66 IN | TEMPERATURE: 97.1 F | HEART RATE: 76 BPM | OXYGEN SATURATION: 97 % | SYSTOLIC BLOOD PRESSURE: 128 MMHG | DIASTOLIC BLOOD PRESSURE: 70 MMHG | BODY MASS INDEX: 22.5 KG/M2 | WEIGHT: 140 LBS

## 2021-07-22 PROCEDURE — 99214 OFFICE O/P EST MOD 30 MIN: CPT | Mod: 25

## 2021-07-22 PROCEDURE — 36415 COLL VENOUS BLD VENIPUNCTURE: CPT

## 2021-08-21 ENCOUNTER — RX RENEWAL (OUTPATIENT)
Age: 71
End: 2021-08-21

## 2021-08-25 ENCOUNTER — APPOINTMENT (OUTPATIENT)
Dept: FAMILY MEDICINE | Facility: CLINIC | Age: 71
End: 2021-08-25

## 2021-09-04 ENCOUNTER — RX RENEWAL (OUTPATIENT)
Age: 71
End: 2021-09-04

## 2021-09-07 ENCOUNTER — APPOINTMENT (OUTPATIENT)
Dept: FAMILY MEDICINE | Facility: CLINIC | Age: 71
End: 2021-09-07

## 2021-09-24 ENCOUNTER — APPOINTMENT (OUTPATIENT)
Dept: FAMILY MEDICINE | Facility: CLINIC | Age: 71
End: 2021-09-24

## 2021-10-04 ENCOUNTER — APPOINTMENT (OUTPATIENT)
Dept: FAMILY MEDICINE | Facility: CLINIC | Age: 71
End: 2021-10-04
Payer: MEDICARE

## 2021-10-04 VITALS
HEIGHT: 66 IN | TEMPERATURE: 97 F | SYSTOLIC BLOOD PRESSURE: 136 MMHG | OXYGEN SATURATION: 99 % | RESPIRATION RATE: 14 BRPM | HEART RATE: 83 BPM | WEIGHT: 141 LBS | DIASTOLIC BLOOD PRESSURE: 78 MMHG | BODY MASS INDEX: 22.66 KG/M2

## 2021-10-04 PROCEDURE — G0008: CPT

## 2021-10-04 PROCEDURE — 90686 IIV4 VACC NO PRSV 0.5 ML IM: CPT

## 2021-10-04 PROCEDURE — 99215 OFFICE O/P EST HI 40 MIN: CPT | Mod: 25

## 2021-10-08 ENCOUNTER — APPOINTMENT (OUTPATIENT)
Dept: RHEUMATOLOGY | Facility: CLINIC | Age: 71
End: 2021-10-08
Payer: MEDICARE

## 2021-10-08 VITALS
HEIGHT: 66 IN | SYSTOLIC BLOOD PRESSURE: 123 MMHG | BODY MASS INDEX: 22.5 KG/M2 | OXYGEN SATURATION: 98 % | HEART RATE: 74 BPM | WEIGHT: 140 LBS | DIASTOLIC BLOOD PRESSURE: 74 MMHG

## 2021-10-08 DIAGNOSIS — R76.8 OTHER SPECIFIED ABNORMAL IMMUNOLOGICAL FINDINGS IN SERUM: ICD-10-CM

## 2021-10-08 DIAGNOSIS — K21.9 GASTRO-ESOPHAGEAL REFLUX DISEASE W/OUT ESOPHAGITIS: ICD-10-CM

## 2021-10-08 PROCEDURE — 99215 OFFICE O/P EST HI 40 MIN: CPT

## 2021-10-13 ENCOUNTER — MED ADMIN CHARGE (OUTPATIENT)
Age: 71
End: 2021-10-13

## 2021-10-29 PROBLEM — R76.8 POSITIVE ANA (ANTINUCLEAR ANTIBODY): Status: ACTIVE | Noted: 2019-11-01

## 2021-11-10 ENCOUNTER — NON-APPOINTMENT (OUTPATIENT)
Age: 71
End: 2021-11-10

## 2021-11-15 RX ORDER — TERIPARATIDE 250 UG/ML
620 INJECTION, SOLUTION SUBCUTANEOUS
Qty: 3 | Refills: 0 | Status: DISCONTINUED | COMMUNITY
Start: 2021-11-10 | End: 2021-11-15

## 2021-11-22 ENCOUNTER — APPOINTMENT (OUTPATIENT)
Dept: RHEUMATOLOGY | Facility: CLINIC | Age: 71
End: 2021-11-22
Payer: MEDICARE

## 2021-11-22 VITALS
HEART RATE: 84 BPM | BODY MASS INDEX: 22.82 KG/M2 | DIASTOLIC BLOOD PRESSURE: 72 MMHG | SYSTOLIC BLOOD PRESSURE: 152 MMHG | WEIGHT: 142 LBS | OXYGEN SATURATION: 97 % | HEIGHT: 66 IN

## 2021-11-22 DIAGNOSIS — I78.1 NEVUS, NON-NEOPLASTIC: ICD-10-CM

## 2021-11-22 DIAGNOSIS — L94.3 SCLERODACTYLY: ICD-10-CM

## 2021-11-22 PROCEDURE — 99213 OFFICE O/P EST LOW 20 MIN: CPT

## 2021-11-23 PROBLEM — L94.3 SCLERODACTYLY: Status: ACTIVE | Noted: 2019-11-01

## 2021-11-23 PROBLEM — I78.1 TELANGIECTASIAS: Status: ACTIVE | Noted: 2019-11-01

## 2021-11-24 ENCOUNTER — APPOINTMENT (OUTPATIENT)
Dept: RHEUMATOLOGY | Facility: CLINIC | Age: 71
End: 2021-11-24
Payer: MEDICARE

## 2021-11-24 VITALS
TEMPERATURE: 98.2 F | HEART RATE: 85 BPM | RESPIRATION RATE: 17 BRPM | DIASTOLIC BLOOD PRESSURE: 70 MMHG | OXYGEN SATURATION: 96 % | HEIGHT: 66 IN | SYSTOLIC BLOOD PRESSURE: 120 MMHG

## 2021-11-24 DIAGNOSIS — Z71.89 OTHER SPECIFIED COUNSELING: ICD-10-CM

## 2021-11-24 PROCEDURE — 99212 OFFICE O/P EST SF 10 MIN: CPT

## 2021-11-29 PROBLEM — Z71.89 ENCOUNTER FOR INJECTION EDUCATION: Status: ACTIVE | Noted: 2021-11-29

## 2021-11-29 NOTE — HISTORY OF PRESENT ILLNESS
[___ Week(s) Ago] : [unfilled] week(s) ago [Anti-SCL70] : Anti-SCL70 [Raynaud's] : raynaud's [Raynaud's:] : raynaud's [Calcinosis] : calcinosis [Skin Tightening] : skin tightening [Diffuse] : diffuse [Inflammatory Arthritis] : inflammatory arthritis [Sclerodactyly] : sclerodactyly [Pulmonary Hypertension] : pulmonary hypertension [Stable] : stable [GERD] : GERD [FreeTextEntry1] : Here for Tymlos injection teaching appt again \par Reviewed in detail  [Telangiectasia] : no telangiectasia [Digital Ulcers:] : no digital ulcers [Digital Pits] : no digital pits [Tendon friction rubs] : no tendon friction rubs [Myositis] : no myositis [Interstitial Lung Disease] : no interstitial lung disease [Dysmotility] : no dysmotility [Gastroparesis] : no gastroparesis [GAVE] : no gastric antral vascular ectasia [H/O Scleroderma Renal Crisis] : no history of scleroderma renal crisis [TextBox_2] : 2019 [TextBox_43] : 2018 [TextBox_30] : 1/2021 [TextBox_38] : HCQ

## 2021-11-29 NOTE — ADDENDUM
[FreeTextEntry1] : I, Dinorah Savage wrote this note acting as a scribe for Dr. Reinier Ortiz MD on Nov 24, 2021 .\par \par I, Dr. Reinier Ortiz MD, ordering physician, have read and attest that all the information, medical decision making and discharge instructions within are true and accurate on 11/24/2021.

## 2021-11-29 NOTE — ASSESSMENT
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  Previous PET CT negative w/u and needs repeat. Nodules may be 2/2 scleroderma pulmonary process vs malignancy - patient needs further repeat PET and continued screening. There are no active features of ILD on her imaging. She has a history of Afib s/p ablation remaining on anticoagulation, previous echocardiogram without significant PAH findings (20-25 mmHg). She has degenerative OA affecting her peripheral and axial joints. She has no evidence of inflammatory arthritis. Overall her scleroderma does not appear to have any significant cardiopulmonary involvement which would require immunosuppression. If any changes in PFTs or evidence of interstitial process, may recommend early use of Ofev prior to immunosuppression.\par S/P RMSF episode treatment w/ doxycycline, no active infection\par Stopped taking duloxetine. Remains on APAP prn and supplements\par BMD- used to be done by GYN. no longer follows. Now DEXA UTD and on Ca and D3\par \par - DEXA scan reviewed, overall t-score -3.0\par - Start Calcitonin nasal spray, alternating nostrils daily. - renewed for new spray\par - Start Tymlos 3120mcg SQ injections daily - injection teaching reviewed in detail with patient. R/B/A reviewed. Pt tolerated ability to inject drug herself w/ feedback provided. - pending assistance program \par - c/w Calcium 600mg BID. c/w Vitamin D 1000 UT daily\par - c/w HCQ 200mg BID\par - c/w supportive measures for OA treatment including APAP for mild-moderate pain and sparing use of NSAIDs for moderate-severe pain.\par - Topical/conservative measures for calcinosis, isolated mild calcified nodules SQ/dermal in forearms/hands- no medical treatment exists systemically for those (only severe calcinosis w/ skin ulceration, frequently seen in severe types of DM/PM).\par HCM: Pt received both doses of COVID-19 vaccine and booster dose. \par \par RTO 3 months

## 2021-12-14 NOTE — HISTORY OF PRESENT ILLNESS
[___ Month(s) Ago] : [unfilled] month(s) ago [Anti-SCL70] : Anti-SCL70 [Raynaud's] : raynaud's [Raynaud's:] : raynaud's [Calcinosis] : calcinosis [Skin Tightening] : skin tightening [Diffuse] : diffuse [Inflammatory Arthritis] : inflammatory arthritis [Sclerodactyly] : sclerodactyly [Pulmonary Hypertension] : pulmonary hypertension [Stable] : stable [GERD] : GERD [FreeTextEntry1] : Tried meleluca and CBD oil w/o much benefits. Was taking formula w/o vit K ( joint formulation), however also expensive so won't continue\par on HCQ 200mg BID\par MIRYAM prn\par Hand thickening persists. Notes some SQ nodules tender around MCPs L hand and R forearm, calcinosis\par Pending PET/CT\par Last TTE 1/2021 w/ mild PAH [Telangiectasia] : no telangiectasia [Digital Ulcers:] : no digital ulcers [Digital Pits] : no digital pits [Tendon friction rubs] : no tendon friction rubs [Myositis] : no myositis [Interstitial Lung Disease] : no interstitial lung disease [Dysmotility] : no dysmotility [Gastroparesis] : no gastroparesis [GAVE] : no gastric antral vascular ectasia [H/O Scleroderma Renal Crisis] : no history of scleroderma renal crisis [TextBox_2] : 2019 [TextBox_43] : 2018 [TextBox_30] : 1/2021 [TextBox_38] : HCQ [de-identified] : \par \par All other ROS negative except as mentioned in HPI.

## 2021-12-14 NOTE — PHYSICAL EXAM
[General Appearance - Alert] : alert [General Appearance - Well Nourished] : well nourished [General Appearance - In No Acute Distress] : in no acute distress [General Appearance - Well Developed] : well developed [General Appearance - Well-Appearing] : healthy appearing [Sclera] : the sclera and conjunctiva were normal [PERRL With Normal Accommodation] : pupils were equal in size, round, and reactive to light [Extraocular Movements] : extraocular movements were intact [Oropharynx] : the oropharynx was normal [Outer Ear] : the ears and nose were normal in appearance [Neck Appearance] : the appearance of the neck was normal [Neck Cervical Mass (___cm)] : no neck mass was observed [Jugular Venous Distention Increased] : there was no jugular-venous distention [Thyroid Diffuse Enlargement] : the thyroid was not enlarged [Thyroid Nodule] : there were no palpable thyroid nodules [Respiration, Rhythm And Depth] : normal respiratory rhythm and effort [Auscultation Breath Sounds / Voice Sounds] : lungs were clear to auscultation bilaterally [Heart Rate And Rhythm] : heart rate was normal and rhythm regular [Heart Sounds] : normal S1 and S2 [Heart Sounds Gallop] : no gallops [Murmurs] : no murmurs [Heart Sounds Pericardial Friction Rub] : no pericardial rub [Full Pulse] : the pedal pulses are present [Edema] : there was no peripheral edema [Bowel Sounds] : normal bowel sounds [Abdomen Soft] : soft [Abdomen Tenderness] : non-tender [Abdomen Mass (___ Cm)] : no abdominal mass palpated [Cervical Lymph Nodes Enlarged Posterior Bilaterally] : posterior cervical [Cervical Lymph Nodes Enlarged Anterior Bilaterally] : anterior cervical [Supraclavicular Lymph Nodes Enlarged Bilaterally] : supraclavicular [Axillary Lymph Nodes Enlarged Bilaterally] : axillary [No CVA Tenderness] : no ~M costovertebral angle tenderness [No Spinal Tenderness] : no spinal tenderness [Abnormal Walk] : normal gait [Nail Clubbing] : no clubbing  or cyanosis of the fingernails [Musculoskeletal - Swelling] : no joint swelling seen [Motor Tone] : muscle strength and tone were normal [Skin Turgor] : normal skin turgor [] : no rash [Deep Tendon Reflexes (DTR)] : deep tendon reflexes were 2+ and symmetric [Sensation] : the sensory exam was normal to light touch and pinprick [Motor Exam] : the motor exam was normal [No Focal Deficits] : no focal deficits [Affect] : the affect was normal [Mood] : the mood was normal [FreeTextEntry1] : Sclerodactyly, Raynaud's.  Digital swelling B/L, most signficantly on L #1

## 2021-12-14 NOTE — ADDENDUM
[FreeTextEntry1] : I, Rina Flores wrote this note acting as a scribe for Dr. Reinier Ortiz MD on Jul 22, 2021. \par \par \par I, Dr. Reinier Ortiz MD, ordering physician, have read and attest that all the information, medical decision making and discharge instructions within are true and accurate on 07/22/2021\par

## 2022-01-07 ENCOUNTER — APPOINTMENT (OUTPATIENT)
Dept: RHEUMATOLOGY | Facility: CLINIC | Age: 72
End: 2022-01-07
Payer: MEDICARE

## 2022-01-07 PROCEDURE — 99442: CPT | Mod: 95

## 2022-01-18 ENCOUNTER — APPOINTMENT (OUTPATIENT)
Dept: FAMILY MEDICINE | Facility: CLINIC | Age: 72
End: 2022-01-18
Payer: MEDICARE

## 2022-01-18 ENCOUNTER — NON-APPOINTMENT (OUTPATIENT)
Age: 72
End: 2022-01-18

## 2022-01-18 DIAGNOSIS — M54.9 DORSALGIA, UNSPECIFIED: ICD-10-CM

## 2022-01-18 DIAGNOSIS — I10 ESSENTIAL (PRIMARY) HYPERTENSION: ICD-10-CM

## 2022-01-18 PROCEDURE — 99443: CPT | Mod: 95

## 2022-01-20 PROBLEM — M54.9 BACK PAIN: Status: ACTIVE | Noted: 2019-03-20

## 2022-01-25 RX ORDER — PEN NEEDLE, DIABETIC 29 G X1/2"
31G X 5 MM NEEDLE, DISPOSABLE MISCELLANEOUS
Qty: 1 | Refills: 3 | Status: ACTIVE | COMMUNITY
Start: 2021-11-15 | End: 1900-01-01

## 2022-02-18 ENCOUNTER — RX RENEWAL (OUTPATIENT)
Age: 72
End: 2022-02-18

## 2022-03-15 NOTE — OCCUPATIONAL THERAPY INITIAL EVALUATION ADULT - DIAGNOSIS, OT EVAL
[facility]  [facility]  Phone: [phone]        Pt Name: Dallas Austin  MRN: [id number]  [place] 1962  Date of evaluation: 3/14/22    CHIEFCOMPLAINT       Chief Complaint   Patient presents with    Vaginal Pain     possible vaginal laceration pain started one week ago. HISTORY OF PRESENT ILLNESS (Location/Symptom, Timing/Onset, Context/Setting, Quality, Duration, Modifying Factors, Severity)      Dallas Austin is a 60 y.o. female with no pertinent PMH who presents to the ED via private auto with vaginal irritation and pain that been ongoing for over a week. Patient states she believes she has lichen sclerosus as she had it years ago after giving birth to her daughter and had similar presentation. She denies any abdominal pain, fevers or chills, chest pain, shortness of breath, nausea vomiting or diarrhea but does report some dysuria but denies any hematuria. States that she is not sexually active in multiple years and has very little concern for any STIs. She denies any vaginal bleeding, vaginal discharge, or known vaginal injury. She does not take any medication prior to arrival for symptoms. She states that not much makes her symptoms better and that urinating in touch and sore area does make her symptoms worse. PAST MEDICAL / SURGICAL / SOCIAL / FAMILY HISTORY     PMH:  has a past medical history of Chronic back pain and Hypothyroidism. Surgical History:  has a past surgical history that includes Cholecystectomy; Tubal ligation; and other surgical history. Social History:  reports that she quit smoking about 27 years ago. Her smoking use included cigarettes. She has never used smokeless tobacco. She reports that she does not drink alcohol and does not use drugs. Family History: She indicated that her mother is alive. She indicated that her father is .    family history includes Arthritis in her mother; Heart Disease in her father. Psychiatric History: None    Allergies: Patient has no known allergies. Home Medications:   Prior to Admission medications    Medication Sig Start Date End Date Taking? Authorizing Provider   metroNIDAZOLE (FLAGYL) 500 MG tablet Take 1 tablet by mouth 2 times daily for 7 days 3/14/22 3/21/22 Yes Amy Woodard, APRN - CNP   Omega-3 Fatty Acids (OMEGA 3 PO) Take by mouth    Historical Provider, MD   naproxen (NAPROSYN) 500 MG tablet Take 1 tablet by mouth 2 times daily (with meals) 5/4/16   Zuleyma Granda PA-C   cyclobenzaprine (FLEXERIL) 10 MG tablet Take 1 tablet by mouth every evening 5/4/16   Zuleyma Granda PA-C   traMADol (ULTRAM) 50 MG tablet 1-2 po q 6 hours as needed 5/4/16   Zuleyma Granda PA-C   predniSONE (DELTASONE) 10 MG tablet 6 po qd for 1 day,then 5 po qd for 1 day,4 po qd for 1 day, 3 po qd for 1 day,2 po qd for 1 day,1 po qd for 1 day 5/4/16   Anabella Granda PA-C   levothyroxine (SYNTHROID) 50 MCG tablet Take 1 tablet by mouth Daily 2/4/16 5/4/16  Anabella Granda PA-C   Biotin 5000 MCG CAPS Take by mouth Daily    Historical Provider, MD   Cholecalciferol (VITAMIN D) 2000 UNITS CAPS capsule Take by mouth Daily    Historical Provider, MD   Loratadine (CLARITIN) 10 MG CAPS Take by mouth Daily    Historical Provider, MD   Docusate Calcium (STOOL SOFTENER PO) Take by mouth    Historical Provider, MD   Nutritional Supplements (ESTROVEN PO) Take by mouth 2 times daily    Historical Provider, MD   TURMERIC CURCUMIN PO   Take by mouth Daily     Historical Provider, MD   Multiple Vitamin (MULTI-VITAMIN DAILY PO) Take by mouth Daily    Historical Provider, MD   MAGNESIUM GLYCINATE PLUS PO Take by mouth 2 times daily    Historical Provider, MD       REVIEW OF SYSTEMS  (2-9 systems for level 4, 10 ormore for level 5)      Review of Systems   Constitutional: Negative. HENT: Negative. Eyes: Negative. Respiratory: Negative. Cardiovascular: Negative.     Gastrointestinal: Negative. Genitourinary: Positive for dysuria and vaginal pain. Negative for hematuria, vaginal bleeding and vaginal discharge. Musculoskeletal: Negative. Skin: Negative. Neurological: Negative. All other systems negative except as marked. PHYSICAL EXAM  (up to 7 for level 4, 8 or more for level 5)      INITIAL VITALS:  height is 5' 7\" (1.702 m) and weight is 90.7 kg (200 lb). Her temperature is 97.2 °F (36.2 °C). Her blood pressure is 128/76 and her pulse is 77. Her respiration is 16 and oxygen saturation is 98%. Vital signs reviewed. Physical Exam  Exam conducted with a chaperone present. Constitutional:       General: She is not in acute distress. Appearance: She is not toxic-appearing. HENT:      Head: Normocephalic and atraumatic. Abdominal:      General: Abdomen is flat. There is no distension. Palpations: Abdomen is soft. There is no mass. Tenderness: There is no abdominal tenderness. There is no guarding. Hernia: No hernia is present. There is no hernia in the left inguinal area or right inguinal area. Genitourinary:     Exam position: Lithotomy position. Pubic Area: No rash or pubic lice. Labia:         Right: No rash, tenderness, lesion or injury. Left: Tenderness and injury present. Urethra: No prolapse or urethral swelling. Vagina: Vaginal discharge present. No erythema, bleeding or lesions. Cervix: Normal.      Rectum: External hemorrhoid present. Musculoskeletal:      Cervical back: Neck supple. No rigidity. Right lower leg: No edema. Left lower leg: No edema. Lymphadenopathy:      Lower Body: No right inguinal adenopathy. No left inguinal adenopathy. Skin:     General: Skin is warm and dry. Capillary Refill: Capillary refill takes less than 2 seconds. Neurological:      General: No focal deficit present. Mental Status: She is alert. Mental status is at baseline.    Psychiatric: Lumbar region hematoma Mood and Affect: Mood normal.         Behavior: Behavior normal.           DIFFERENTIAL DIAGNOSIS / MDM     After my physical exam, patient does have an area of excoriation to her vaginal opening with no active bleeding or discharge noted. During pelvic exam, there is noted thin white discharge, specimens were sent to lab for analysis. There is no evidence of vaginal bleeding, vaginal mass, or any foreign bodies noted. Patient does have an external hemorrhoid that was noted during the exam.  I will provide the patient with topical lidocaine to help with pain and discomfort at this time. UA is unremarkable for any infection. Vaginitis probe was positive for bacterial vaginosis. Plan to treat patient with Flagyl and discharge her to follow-up with her PCP within 1 day. Instructed patient take medication as prescribed. Return to return to ER with any worsening symptoms, fevers or chills, hematuria, vaginal bleeding, or worsening pain. Patient is agreement to this plan at this time. All question concerns were answered at this time. The patient presents with a Urinary Tract Infection. Evaluation of the abdomen and back is benign. No guarding, peritoneal signs, sepsis, toxicity, significant tenderness, life threatening or serious etiology was noted. The patient is tolerating PO intake. The patient appears stable for discharge and has been instructed to return immediately if the symptoms worsen in any way, or in 1-2 days if not improved for re-evaluation. We also discussed returning to the Emergency Department immediately if new or worsening symptoms occur. We have discussed the symptoms which are most concerning (e.g., abdominal or back pain, fever, a feeling of passing out, light headed, dizziness, chest pain, shortness of breath, persistent nausea and/or vomiting, numbness or weakness to the arms or legs, coolness or color change of the arms or legs) that necessitate immediate return.      The patient understands that at this time there is no evidence for a more malignant underlying process, but the patient also understands that early in the process of an illness or injury, an emergency department workup can be falsely reassuring. Routine discharge counseling was given, and the patient understands that worsening, changing or persistent symptoms should prompt an immediate call or follow up with their primary physician or return to the emergency department. The importance of appropriate follow up was also discussed. I have reviewed the disposition diagnosis with the patient and or their family/guardian. I have answered their questions and given discharge instructions. They voiced understanding of these instructions and did not have any further questions or complaints. PLAN (LABS / IMAGING / EKG):  Orders Placed This Encounter   Procedures    Vaginitis DNA Probe    Urinalysis with Reflex to Culture       MEDICATIONS ORDERED:  Orders Placed This Encounter   Medications    lidocaine (XYLOCAINE) 2 % uro-jet    metroNIDAZOLE (FLAGYL) tablet 500 mg     Order Specific Question:   Antimicrobial Indications     Answer:   Urinary Tract Infection    metroNIDAZOLE (FLAGYL) 500 MG tablet     Sig: Take 1 tablet by mouth 2 times daily for 7 days     Dispense:  14 tablet     Refill:  0       Controlled Substances Monitoring:     DIAGNOSTIC RESULTS     EKG: All EKG's are interpreted by the Emergency Department Physician who either signs or Co-signs this chart in the absenceof a cardiologist.        RADIOLOGY: All images are read by the radiologist and their interpretations are reviewed. No orders to display       No results found. LABS:  Results for orders placed or performed during the hospital encounter of 03/14/22   Vaginitis DNA Probe    Specimen: Vaginal   Result Value Ref Range    Source . VAGINAL SWAB     Trichomonas Vaginalis DNA NEGATIVE NEGATIVE    GARDNERELLA VAGINALIS, DNA PROBE POSITIVE (A) NEGATIVE    CANDIDA SPECIES, DNA PROBE NEGATIVE NEGATIVE   Urinalysis with Reflex to Culture    Specimen: Urine   Result Value Ref Range    Color, UA Yellow Yellow    Turbidity UA Clear Clear    Glucose, Ur NEGATIVE NEGATIVE    Bilirubin Urine NEGATIVE NEGATIVE    Ketones, Urine NEGATIVE NEGATIVE    Specific Gravity, UA 1.022 1.005 - 1.030    Urine Hgb NEGATIVE NEGATIVE    pH, UA 5.5 5.0 - 8.0    Protein, UA NEGATIVE NEGATIVE    Urobilinogen, Urine Normal Normal    Nitrite, Urine NEGATIVE NEGATIVE    Leukocyte Esterase, Urine NEGATIVE NEGATIVE    Urinalysis Comments       Microscopic exam not performed based on chemical results unless requested in original order. Urinalysis Comments          Urinalysis Comments       Utilizing a urinalysis as the only screening method to exclude a potential uropathogen can be unreliable in many patient populations. Rapid screening tests are less sensitive than culture and if UTI is a clinical possibility, culture should be considered despite a negative urinalysis. EMERGENCY DEPARTMENT COURSE           Vitals:    Vitals:    03/14/22 2009   BP: 128/76   Pulse: 77   Resp: 16   Temp: 97.2 °F (36.2 °C)   SpO2: 98%   Weight: 90.7 kg (200 lb)   Height: 5' 7\" (1.702 m)     -------------------------  BP: 128/76, Temp: 97.2 °F (36.2 °C), Pulse: 77, Resp: 16      RE-EVALUATION:  See ED Course notes above. CONSULTS:  None    PROCEDURES:  None    FINAL IMPRESSION      1. Bacterial vaginosis          DISPOSITION / PLAN     CONDITION ON DISPOSITION:   Good for discharge. PATIENT REFERRED TO:  [facility] ED  [address] [place] [address],[facility] [id number]  [phone]    If symptoms worsen      Please call your PCP in one day for follow up. If you do not have a PCP you can Call [phone] ([phone]) to be established with a PCP.           DISCHARGE MEDICATIONS:  New Prescriptions    METRONIDAZOLE (FLAGYL) 500 MG TABLET    Take 1 tablet by mouth 2 times daily for 7 days VENKATA Gao - CNP   Emergency Medicine Nurse Practitioner    (Please note that portions of this note were completed with a voice recognition program.  Efforts were made to edit the dictations but occasionally words aremis-transcribed.)       VENKATA Gao CNP  03/14/22 [id number]

## 2022-03-25 LAB
25(OH)D3 SERPL-MCNC: 27 NG/ML
25(OH)D3 SERPL-MCNC: 27 NG/ML
ALBUMIN SERPL ELPH-MCNC: 3.9 G/DL
ALBUMIN SERPL ELPH-MCNC: 4.3 G/DL
ALP BLD-CCNC: 68 U/L
ALP BLD-CCNC: 87 U/L
ALP BONE SERPL-MCNC: 18.4 UG/L
ALT SERPL-CCNC: 11 U/L
ALT SERPL-CCNC: 12 U/L
ANION GAP SERPL CALC-SCNC: 15 MMOL/L
ANION GAP SERPL CALC-SCNC: 17 MMOL/L
AST SERPL-CCNC: 12 U/L
AST SERPL-CCNC: 15 U/L
BASOPHILS # BLD AUTO: 0.04 K/UL
BASOPHILS NFR BLD AUTO: 0.4 %
BILIRUB SERPL-MCNC: 0.2 MG/DL
BILIRUB SERPL-MCNC: 0.3 MG/DL
BUN SERPL-MCNC: 13 MG/DL
BUN SERPL-MCNC: 21 MG/DL
CALCIUM SERPL-MCNC: 9.3 MG/DL
CALCIUM SERPL-MCNC: 9.3 MG/DL
CALCIUM SERPL-MCNC: 9.7 MG/DL
CHLORIDE SERPL-SCNC: 104 MMOL/L
CHLORIDE SERPL-SCNC: 104 MMOL/L
CO2 SERPL-SCNC: 22 MMOL/L
CO2 SERPL-SCNC: 24 MMOL/L
CREAT SERPL-MCNC: 0.82 MG/DL
CREAT SERPL-MCNC: 0.83 MG/DL
CRP SERPL-MCNC: 5 MG/L
EOSINOPHIL # BLD AUTO: 0.09 K/UL
EOSINOPHIL NFR BLD AUTO: 0.9 %
ERYTHROCYTE [SEDIMENTATION RATE] IN BLOOD BY WESTERGREN METHOD: 47 MM/HR
GLUCOSE SERPL-MCNC: 111 MG/DL
GLUCOSE SERPL-MCNC: 86 MG/DL
HCT VFR BLD CALC: 35.7 %
HGB BLD-MCNC: 9.9 G/DL
IMM GRANULOCYTES NFR BLD AUTO: 0.4 %
LYMPHOCYTES # BLD AUTO: 1.48 K/UL
LYMPHOCYTES NFR BLD AUTO: 15.3 %
MAGNESIUM SERPL-MCNC: 1.4 MG/DL
MAN DIFF?: NORMAL
MCHC RBC-ENTMCNC: 25.4 PG
MCHC RBC-ENTMCNC: 27.7 GM/DL
MCV RBC AUTO: 91.5 FL
MONOCYTES # BLD AUTO: 0.54 K/UL
MONOCYTES NFR BLD AUTO: 5.6 %
NEUTROPHILS # BLD AUTO: 7.47 K/UL
NEUTROPHILS NFR BLD AUTO: 77.4 %
PARATHYROID HORMONE INTACT: 62 PG/ML
PHOSPHATE SERPL-MCNC: 3.8 MG/DL
PLATELET # BLD AUTO: 408 K/UL
POTASSIUM SERPL-SCNC: 4.4 MMOL/L
POTASSIUM SERPL-SCNC: 4.4 MMOL/L
PROT SERPL-MCNC: 6.6 G/DL
PROT SERPL-MCNC: 7.2 G/DL
RBC # BLD: 3.9 M/UL
RBC # FLD: 15.7 %
SODIUM SERPL-SCNC: 142 MMOL/L
SODIUM SERPL-SCNC: 143 MMOL/L
WBC # FLD AUTO: 9.66 K/UL

## 2022-04-11 RX ORDER — TERIPARATIDE 250 UG/ML
620 INJECTION, SOLUTION SUBCUTANEOUS
Qty: 3 | Refills: 0 | Status: DISCONTINUED | COMMUNITY
Start: 2021-12-29 | End: 2022-04-11

## 2022-04-25 ENCOUNTER — NON-APPOINTMENT (OUTPATIENT)
Age: 72
End: 2022-04-25

## 2022-05-10 ENCOUNTER — APPOINTMENT (OUTPATIENT)
Dept: RHEUMATOLOGY | Facility: CLINIC | Age: 72
End: 2022-05-10
Payer: MEDICARE

## 2022-05-10 VITALS
BODY MASS INDEX: 22.82 KG/M2 | DIASTOLIC BLOOD PRESSURE: 75 MMHG | HEIGHT: 66 IN | HEART RATE: 88 BPM | RESPIRATION RATE: 18 BRPM | SYSTOLIC BLOOD PRESSURE: 175 MMHG | WEIGHT: 142 LBS

## 2022-05-10 DIAGNOSIS — I27.20 PULMONARY HYPERTENSION, UNSPECIFIED: ICD-10-CM

## 2022-05-10 DIAGNOSIS — T78.40XA ALLERGY, UNSPECIFIED, INITIAL ENCOUNTER: ICD-10-CM

## 2022-05-10 PROCEDURE — 36415 COLL VENOUS BLD VENIPUNCTURE: CPT

## 2022-05-10 PROCEDURE — 99214 OFFICE O/P EST MOD 30 MIN: CPT | Mod: 25

## 2022-05-16 ENCOUNTER — LABORATORY RESULT (OUTPATIENT)
Age: 72
End: 2022-05-16

## 2022-05-17 LAB
BASOPHILS # BLD AUTO: 0 K/UL
BASOPHILS NFR BLD AUTO: 0 %
EOSINOPHIL # BLD AUTO: 0.53 K/UL
EOSINOPHIL NFR BLD AUTO: 3.5 %
HCT VFR BLD CALC: 32.7 %
HGB BLD-MCNC: 8.2 G/DL
LYMPHOCYTES # BLD AUTO: 4.07 K/UL
LYMPHOCYTES NFR BLD AUTO: 26.9 %
MAN DIFF?: NORMAL
MCHC RBC-ENTMCNC: 17.5 PG
MCHC RBC-ENTMCNC: 25.1 GM/DL
MCV RBC AUTO: 69.7 FL
MONOCYTES # BLD AUTO: 0.79 K/UL
MONOCYTES NFR BLD AUTO: 5.2 %
NEUTROPHILS # BLD AUTO: 9.61 K/UL
NEUTROPHILS NFR BLD AUTO: 63.5 %
PLATELET # BLD AUTO: 638 K/UL
RBC # BLD: 4.69 M/UL
RBC # FLD: 19.9 %
WBC # FLD AUTO: 15.13 K/UL

## 2022-05-18 LAB
A ALTERNATA IGE QN: <0.1 KUA/L
A FUMIGATUS IGE QN: <0.1 KUA/L
C ALBICANS IGE QN: 0.29 KUA/L
C HERBARUM IGE QN: <0.1 KUA/L
CAT DANDER IGE QN: <0.1 KUA/L
COMMON RAGWEED IGE QN: 0.37 KUA/L
D FARINAE IGE QN: <0.1 KUA/L
D FARINAE IGE QN: <0.1 KUA/L
D PTERONYSS IGE QN: <0.1 KUA/L
D PTERONYSS IGE QN: <0.1 KUA/L
DEPRECATED A ALTERNATA IGE RAST QL: 0
DEPRECATED A FUMIGATUS IGE RAST QL: 0
DEPRECATED C ALBICANS IGE RAST QL: NORMAL
DEPRECATED C HERBARUM IGE RAST QL: 0
DEPRECATED CAT DANDER IGE RAST QL: 0
DEPRECATED COMMON RAGWEED IGE RAST QL: 1
DEPRECATED D FARINAE IGE RAST QL: 0
DEPRECATED D FARINAE IGE RAST QL: 0
DEPRECATED D PTERONYSS IGE RAST QL: 0
DEPRECATED D PTERONYSS IGE RAST QL: 0
DEPRECATED DOG DANDER IGE RAST QL: 0
DEPRECATED HOUSE DUST IGE RAST QL: <0.1 KUA/L
DEPRECATED M RACEMOSUS IGE RAST QL: 0
DEPRECATED ROACH IGE RAST QL: 0
DEPRECATED ROACH IGE RAST QL: 0
DEPRECATED TIMOTHY IGE RAST QL: 0
DEPRECATED WHITE OAK IGE RAST QL: 0
DOG DANDER IGE QN: <0.1 KUA/L
HOUSE DUST AP IGE QN: 0
IGE SER-MCNC: 82 KU/L
M RACEMOSUS IGE QN: <0.1 KUA/L
ROACH IGE QN: <0.1 KUA/L
ROACH IGE QN: <0.1 KUA/L
TIMOTHY IGE QN: <0.1 KUA/L
WHITE OAK IGE QN: <0.1 KUA/L

## 2022-05-19 LAB
BARLEY IGE QN: <0.1 KUA/L
CHERRY IGE QN: <0.1 KUA/L
COW MILK IGE QN: <0.1 KUA/L
CRAB IGE QN: <0.1 KUA/L
DEPRECATED BARLEY IGE RAST QL: 0
DEPRECATED CHERRY IGE RAST QL: 0
DEPRECATED COW MILK IGE RAST QL: 0
DEPRECATED CRAB IGE RAST QL: 0
DEPRECATED EGG WHITE IGE RAST QL: 0
DEPRECATED OAT IGE RAST QL: 0
DEPRECATED PEANUT IGE RAST QL: 0
DEPRECATED RYE IGE RAST QL: 0
DEPRECATED SOYBEAN IGE RAST QL: 0
DEPRECATED WHEAT IGE RAST QL: 0
EGG WHITE IGE QN: <0.1 KUA/L
HISTAMINE BLD-MCNC: 0.59 NG/ML
HISTONE AB SER QL: 1.7 UNITS
OAT IGE QN: <0.1 KUA/L
PEANUT IGE QN: <0.1 KUA/L
RYE IGE QN: <0.1 KUA/L
SOYBEAN IGE QN: <0.1 KUA/L
TOTAL IGE SMQN RAST: 82 KU/L
WHEAT IGE QN: <0.1 KUA/L

## 2022-05-23 PROBLEM — I27.20 MILD PULMONARY HYPERTENSION: Status: ACTIVE | Noted: 2021-04-12

## 2022-05-25 ENCOUNTER — RX RENEWAL (OUTPATIENT)
Age: 72
End: 2022-05-25

## 2022-05-30 ENCOUNTER — RX RENEWAL (OUTPATIENT)
Age: 72
End: 2022-05-30

## 2022-10-10 ENCOUNTER — APPOINTMENT (OUTPATIENT)
Dept: RHEUMATOLOGY | Facility: CLINIC | Age: 72
End: 2022-10-10

## 2022-10-10 VITALS
TEMPERATURE: 97 F | SYSTOLIC BLOOD PRESSURE: 138 MMHG | HEART RATE: 91 BPM | BODY MASS INDEX: 27.32 KG/M2 | DIASTOLIC BLOOD PRESSURE: 72 MMHG | HEIGHT: 66 IN | WEIGHT: 170 LBS | RESPIRATION RATE: 18 BRPM | OXYGEN SATURATION: 98 %

## 2022-10-10 DIAGNOSIS — L29.9 PRURITUS, UNSPECIFIED: ICD-10-CM

## 2022-10-10 DIAGNOSIS — Z98.890 OTHER SPECIFIED POSTPROCEDURAL STATES: ICD-10-CM

## 2022-10-10 DIAGNOSIS — E55.9 VITAMIN D DEFICIENCY, UNSPECIFIED: ICD-10-CM

## 2022-10-10 PROCEDURE — 99215 OFFICE O/P EST HI 40 MIN: CPT

## 2022-10-10 RX ORDER — ABALOPARATIDE 2000 UG/ML
3120 INJECTION, SOLUTION SUBCUTANEOUS
Qty: 3 | Refills: 2 | Status: DISCONTINUED | COMMUNITY
Start: 2021-11-15 | End: 2022-10-10

## 2022-10-10 RX ORDER — CALCITONIN SALMON 200 [IU]/1
200 SPRAY, METERED NASAL DAILY
Qty: 3 | Refills: 3 | Status: DISCONTINUED | COMMUNITY
Start: 2021-10-08 | End: 2022-10-10

## 2022-10-10 RX ORDER — ABALOPARATIDE 2000 UG/ML
3120 INJECTION, SOLUTION SUBCUTANEOUS
Qty: 1 | Refills: 0 | Status: DISCONTINUED | OUTPATIENT
Start: 2021-12-20 | End: 2022-10-10

## 2022-10-10 RX ORDER — DENOSUMAB 60 MG/ML
60 INJECTION SUBCUTANEOUS
Qty: 1 | Refills: 1 | Status: DISCONTINUED | COMMUNITY
Start: 2022-10-10 | End: 2022-10-10

## 2022-10-26 ENCOUNTER — APPOINTMENT (OUTPATIENT)
Dept: FAMILY MEDICINE | Facility: CLINIC | Age: 72
End: 2022-10-26

## 2022-10-26 VITALS
TEMPERATURE: 98 F | HEIGHT: 66 IN | HEART RATE: 100 BPM | DIASTOLIC BLOOD PRESSURE: 71 MMHG | RESPIRATION RATE: 16 BRPM | WEIGHT: 170 LBS | SYSTOLIC BLOOD PRESSURE: 108 MMHG | BODY MASS INDEX: 27.32 KG/M2 | OXYGEN SATURATION: 99 %

## 2022-10-26 DIAGNOSIS — Z23 ENCOUNTER FOR IMMUNIZATION: ICD-10-CM

## 2022-10-26 PROCEDURE — G0008: CPT

## 2022-10-26 PROCEDURE — 99214 OFFICE O/P EST MOD 30 MIN: CPT | Mod: 25

## 2022-10-26 PROCEDURE — 90662 IIV NO PRSV INCREASED AG IM: CPT

## 2022-10-27 PROBLEM — Z98.890 STATUS POST KYPHOPLASTY: Status: ACTIVE | Noted: 2021-10-08

## 2022-11-18 ENCOUNTER — APPOINTMENT (OUTPATIENT)
Dept: RHEUMATOLOGY | Facility: CLINIC | Age: 72
End: 2022-11-18

## 2022-11-18 PROCEDURE — 96372 THER/PROPH/DIAG INJ SC/IM: CPT

## 2022-11-18 RX ORDER — DENOSUMAB 60 MG/ML
60 INJECTION SUBCUTANEOUS
Qty: 1 | Refills: 0 | Status: COMPLETED | OUTPATIENT
Start: 2022-11-18

## 2022-11-18 RX ADMIN — DENOSUMAB 0 MG/ML: 60 INJECTION SUBCUTANEOUS at 00:00

## 2022-11-22 ENCOUNTER — RX RENEWAL (OUTPATIENT)
Age: 72
End: 2022-11-22

## 2023-01-01 ENCOUNTER — RX RENEWAL (OUTPATIENT)
Age: 73
End: 2023-01-01

## 2023-01-13 ENCOUNTER — APPOINTMENT (OUTPATIENT)
Dept: RHEUMATOLOGY | Facility: CLINIC | Age: 73
End: 2023-01-13

## 2023-01-27 ENCOUNTER — APPOINTMENT (OUTPATIENT)
Dept: RHEUMATOLOGY | Facility: CLINIC | Age: 73
End: 2023-01-27

## 2023-04-28 RX ORDER — OXYCODONE AND ACETAMINOPHEN 7.5; 325 MG/1; MG/1
7.5-325 TABLET ORAL
Qty: 120 | Refills: 0 | Status: ACTIVE | COMMUNITY
Start: 2020-06-25 | End: 1900-01-01

## 2023-05-18 ENCOUNTER — RX RENEWAL (OUTPATIENT)
Age: 73
End: 2023-05-18

## 2023-05-30 ENCOUNTER — APPOINTMENT (OUTPATIENT)
Dept: FAMILY MEDICINE | Facility: CLINIC | Age: 73
End: 2023-05-30
Payer: MEDICARE

## 2023-05-30 VITALS
OXYGEN SATURATION: 98 % | WEIGHT: 160 LBS | HEART RATE: 80 BPM | SYSTOLIC BLOOD PRESSURE: 135 MMHG | HEIGHT: 66 IN | BODY MASS INDEX: 25.71 KG/M2 | DIASTOLIC BLOOD PRESSURE: 63 MMHG | RESPIRATION RATE: 16 BRPM | TEMPERATURE: 97.3 F

## 2023-05-30 DIAGNOSIS — M54.50 LOW BACK PAIN, UNSPECIFIED: ICD-10-CM

## 2023-05-30 DIAGNOSIS — R10.9 UNSPECIFIED ABDOMINAL PAIN: ICD-10-CM

## 2023-05-30 PROCEDURE — 99214 OFFICE O/P EST MOD 30 MIN: CPT

## 2023-05-30 RX ORDER — PREDNISONE 20 MG/1
20 TABLET ORAL
Qty: 5 | Refills: 0 | Status: DISCONTINUED | COMMUNITY
Start: 2022-05-10 | End: 2023-05-30

## 2023-05-30 RX ORDER — NYSTATIN 100000 [USP'U]/G
100000 CREAM TOPICAL TWICE DAILY
Qty: 1 | Refills: 2 | Status: ACTIVE | COMMUNITY
Start: 2023-05-30 | End: 1900-01-01

## 2023-05-30 RX ORDER — DIPHENOXYLATE HYDROCHLORIDE AND ATROPINE SULFATE 2.5; .025 MG/1; MG/1
2.5-0.025 TABLET ORAL
Qty: 30 | Refills: 0 | Status: DISCONTINUED | COMMUNITY
Start: 2021-05-20 | End: 2023-05-30

## 2023-05-30 RX ORDER — DIPHENOXYLATE HYDROCHLORIDE AND ATROPINE SULFATE 2.5; .025 MG/1; MG/1
2.5-0.025 TABLET ORAL
Qty: 30 | Refills: 0 | Status: ACTIVE | COMMUNITY
Start: 2023-05-30 | End: 1900-01-01

## 2023-05-30 RX ORDER — DIPHENOXYLATE HYDROCHLORIDE AND ATROPINE SULFATE 2.5; .025 MG/1; MG/1
2.5-0.025 TABLET ORAL
Qty: 30 | Refills: 0 | Status: DISCONTINUED | COMMUNITY
Start: 2023-01-18 | End: 2023-05-30

## 2023-06-06 ENCOUNTER — LABORATORY RESULT (OUTPATIENT)
Age: 73
End: 2023-06-06

## 2023-06-06 ENCOUNTER — TRANSCRIPTION ENCOUNTER (OUTPATIENT)
Age: 73
End: 2023-06-06

## 2023-06-07 ENCOUNTER — NON-APPOINTMENT (OUTPATIENT)
Age: 73
End: 2023-06-07

## 2023-06-07 LAB
25(OH)D3 SERPL-MCNC: 32.2 NG/ML
ALBUMIN SERPL ELPH-MCNC: 4.3 G/DL
ALP BLD-CCNC: 58 U/L
ALT SERPL-CCNC: 14 U/L
AMYLASE/CREAT SERPL: 38 U/L
ANION GAP SERPL CALC-SCNC: 14 MMOL/L
APPEARANCE: ABNORMAL
AST SERPL-CCNC: 16 U/L
BILIRUB SERPL-MCNC: 0.2 MG/DL
BILIRUBIN URINE: NEGATIVE
BLOOD URINE: NEGATIVE
BUN SERPL-MCNC: 17 MG/DL
CALCIUM SERPL-MCNC: 9.9 MG/DL
CHLORIDE SERPL-SCNC: 108 MMOL/L
CHOLEST SERPL-MCNC: 95 MG/DL
CO2 SERPL-SCNC: 20 MMOL/L
COLOR: YELLOW
CREAT SERPL-MCNC: 0.67 MG/DL
CREAT SPEC-SCNC: 154 MG/DL
EGFR: 92 ML/MIN/1.73M2
ESTIMATED AVERAGE GLUCOSE: 111 MG/DL
FERRITIN SERPL-MCNC: 9 NG/ML
FOLATE SERPL-MCNC: 8.8 NG/ML
GLUCOSE QUALITATIVE U: NEGATIVE MG/DL
GLUCOSE SERPL-MCNC: 96 MG/DL
HBA1C MFR BLD HPLC: 5.5 %
HDLC SERPL-MCNC: 44 MG/DL
KETONES URINE: ABNORMAL MG/DL
LDLC SERPL CALC-MCNC: 31 MG/DL
LEUKOCYTE ESTERASE URINE: NEGATIVE
LPL SERPL-CCNC: 21 U/L
MICROALBUMIN 24H UR DL<=1MG/L-MCNC: 109.9 MG/DL
MICROALBUMIN/CREAT 24H UR-RTO: 715 MG/G
NITRITE URINE: NEGATIVE
NONHDLC SERPL-MCNC: 50 MG/DL
PH URINE: 5.5
POTASSIUM SERPL-SCNC: 4.8 MMOL/L
PROT SERPL-MCNC: 6.7 G/DL
PROTEIN URINE: 300 MG/DL
SODIUM SERPL-SCNC: 142 MMOL/L
SPECIFIC GRAVITY URINE: 1.03
T3RU NFR SERPL: 1 TBI
T4 FREE SERPL-MCNC: 1.2 NG/DL
TRIGL SERPL-MCNC: 99 MG/DL
TSH SERPL-ACNC: 1.45 UIU/ML
TTG IGA SER IA-ACNC: 1.3 U/ML
TTG IGA SER-ACNC: NEGATIVE
TTG IGG SER IA-ACNC: <1.2 U/ML
TTG IGG SER IA-ACNC: NEGATIVE
UROBILINOGEN URINE: 0.2 MG/DL
VIT B12 SERPL-MCNC: 304 PG/ML

## 2023-06-09 ENCOUNTER — NON-APPOINTMENT (OUTPATIENT)
Age: 73
End: 2023-06-09

## 2023-06-09 LAB
CLAM IGE QN: <0.1 KUA/L
CODFISH IGE QN: <0.1 KUA/L
CORN IGE QN: <0.1 KUA/L
COW MILK IGE QN: <0.1 KUA/L
DEPRECATED CLAM IGE RAST QL: 0
DEPRECATED CODFISH IGE RAST QL: 0
DEPRECATED CORN IGE RAST QL: 0
DEPRECATED COW MILK IGE RAST QL: 0
DEPRECATED EGG WHITE IGE RAST QL: 0
DEPRECATED GLUTEN IGE RAST QL: <0.1 KUA/L
DEPRECATED PEANUT IGE RAST QL: 0
DEPRECATED SCALLOP IGE RAST QL: <0.1 KUA/L
DEPRECATED SESAME SEED IGE RAST QL: 0
DEPRECATED SHRIMP IGE RAST QL: 0
DEPRECATED SOYBEAN IGE RAST QL: 0
DEPRECATED WALNUT IGE RAST QL: 0
DEPRECATED WHEAT IGE RAST QL: 0
EGG WHITE IGE QN: <0.1 KUA/L
GLUTEN IGG QN: 0
PEANUT IGE QN: <0.1 KUA/L
SCALLOP IGE QN: 0
SCALLOP IGE QN: <0.1 KUA/L
SESAME SEED IGE QN: <0.1 KUA/L
SOYBEAN IGE QN: <0.1 KUA/L
TOTAL IGE SMQN RAST: 36 KU/L
WALNUT IGE QN: <0.1 KUA/L
WHEAT IGE QN: <0.1 KUA/L

## 2023-06-21 ENCOUNTER — APPOINTMENT (OUTPATIENT)
Dept: FAMILY MEDICINE | Facility: CLINIC | Age: 73
End: 2023-06-21
Payer: MEDICARE

## 2023-06-21 VITALS
TEMPERATURE: 97.1 F | DIASTOLIC BLOOD PRESSURE: 68 MMHG | HEIGHT: 66 IN | SYSTOLIC BLOOD PRESSURE: 109 MMHG | OXYGEN SATURATION: 99 % | RESPIRATION RATE: 18 BRPM | WEIGHT: 148 LBS | HEART RATE: 66 BPM | BODY MASS INDEX: 23.78 KG/M2

## 2023-06-21 DIAGNOSIS — Z09 ENCOUNTER FOR FOLLOW-UP EXAMINATION AFTER COMPLETED TREATMENT FOR CONDITIONS OTHER THAN MALIGNANT NEOPLASM: ICD-10-CM

## 2023-06-21 PROCEDURE — 99495 TRANSJ CARE MGMT MOD F2F 14D: CPT

## 2023-06-22 PROBLEM — Z09 HOSPITAL DISCHARGE FOLLOW-UP: Status: ACTIVE | Noted: 2020-10-06

## 2023-06-28 ENCOUNTER — LABORATORY RESULT (OUTPATIENT)
Age: 73
End: 2023-06-28

## 2023-06-28 RX ORDER — OXYCODONE 5 MG/1
5 TABLET ORAL
Qty: 120 | Refills: 0 | Status: ACTIVE | COMMUNITY
Start: 2023-06-28 | End: 1900-01-01

## 2023-06-29 ENCOUNTER — APPOINTMENT (OUTPATIENT)
Dept: RHEUMATOLOGY | Facility: CLINIC | Age: 73
End: 2023-06-29
Payer: MEDICARE

## 2023-06-29 VITALS
SYSTOLIC BLOOD PRESSURE: 152 MMHG | TEMPERATURE: 98 F | OXYGEN SATURATION: 100 % | RESPIRATION RATE: 16 BRPM | DIASTOLIC BLOOD PRESSURE: 69 MMHG

## 2023-06-29 PROCEDURE — 96372 THER/PROPH/DIAG INJ SC/IM: CPT

## 2023-06-29 RX ORDER — DENOSUMAB 60 MG/ML
60 INJECTION SUBCUTANEOUS
Qty: 1 | Refills: 0 | Status: COMPLETED
Start: 2023-06-29

## 2023-06-29 RX ORDER — DENOSUMAB 60 MG/ML
60 INJECTION SUBCUTANEOUS
Qty: 1 | Refills: 0 | Status: COMPLETED | OUTPATIENT
Start: 2023-06-29 | End: 1900-01-01

## 2023-07-02 LAB
ALBUMIN SERPL ELPH-MCNC: 4.4 G/DL
ALP BLD-CCNC: 65 U/L
ALT SERPL-CCNC: 18 U/L
ANION GAP SERPL CALC-SCNC: 16 MMOL/L
AST SERPL-CCNC: 18 U/L
BILIRUB SERPL-MCNC: 0.2 MG/DL
BUN SERPL-MCNC: 17 MG/DL
CALCIUM SERPL-MCNC: 9.8 MG/DL
CHLORIDE SERPL-SCNC: 106 MMOL/L
CO2 SERPL-SCNC: 19 MMOL/L
CREAT SERPL-MCNC: 0.59 MG/DL
EGFR: 95 ML/MIN/1.73M2
FERRITIN SERPL-MCNC: 19 NG/ML
GLUCOSE SERPL-MCNC: 105 MG/DL
IRON SATN MFR SERPL: 69 %
IRON SERPL-MCNC: 305 UG/DL
POTASSIUM SERPL-SCNC: 4.2 MMOL/L
PROT SERPL-MCNC: 7.1 G/DL
SODIUM SERPL-SCNC: 141 MMOL/L
TIBC SERPL-MCNC: 444 UG/DL
UIBC SERPL-MCNC: 139 UG/DL

## 2023-07-03 ENCOUNTER — RX RENEWAL (OUTPATIENT)
Age: 73
End: 2023-07-03

## 2023-07-21 ENCOUNTER — APPOINTMENT (OUTPATIENT)
Dept: FAMILY MEDICINE | Facility: CLINIC | Age: 73
End: 2023-07-21
Payer: MEDICARE

## 2023-07-21 VITALS
OXYGEN SATURATION: 95 % | WEIGHT: 152 LBS | RESPIRATION RATE: 18 BRPM | TEMPERATURE: 98.3 F | BODY MASS INDEX: 24.43 KG/M2 | HEART RATE: 74 BPM | SYSTOLIC BLOOD PRESSURE: 155 MMHG | DIASTOLIC BLOOD PRESSURE: 70 MMHG | HEIGHT: 66 IN

## 2023-07-21 DIAGNOSIS — M54.9 DORSALGIA, UNSPECIFIED: ICD-10-CM

## 2023-07-21 DIAGNOSIS — G89.29 DORSALGIA, UNSPECIFIED: ICD-10-CM

## 2023-07-21 DIAGNOSIS — E11.9 TYPE 2 DIABETES MELLITUS W/OUT COMPLICATIONS: ICD-10-CM

## 2023-07-21 DIAGNOSIS — I48.91 UNSPECIFIED ATRIAL FIBRILLATION: ICD-10-CM

## 2023-07-21 DIAGNOSIS — Z00.00 ENCOUNTER FOR GENERAL ADULT MEDICAL EXAMINATION W/OUT ABNORMAL FINDINGS: ICD-10-CM

## 2023-07-21 PROCEDURE — 99213 OFFICE O/P EST LOW 20 MIN: CPT | Mod: 25

## 2023-07-21 PROCEDURE — G0439: CPT

## 2023-07-22 PROBLEM — M54.9 CHRONIC BACK PAIN: Status: ACTIVE | Noted: 2019-03-20

## 2023-08-23 RX ORDER — CHLORHEXIDINE GLUCONATE 4 %
LIQUID (ML) TOPICAL DAILY
Qty: 90 | Refills: 1 | Status: ACTIVE | COMMUNITY
Start: 2023-08-23 | End: 1900-01-01

## 2023-10-03 ENCOUNTER — APPOINTMENT (OUTPATIENT)
Dept: FAMILY MEDICINE | Facility: CLINIC | Age: 73
End: 2023-10-03
Payer: MEDICARE

## 2023-10-03 VITALS
DIASTOLIC BLOOD PRESSURE: 75 MMHG | TEMPERATURE: 98.4 F | RESPIRATION RATE: 19 BRPM | HEART RATE: 71 BPM | SYSTOLIC BLOOD PRESSURE: 142 MMHG | OXYGEN SATURATION: 98 %

## 2023-10-03 PROCEDURE — G0008: CPT

## 2023-10-03 PROCEDURE — 90662 IIV NO PRSV INCREASED AG IM: CPT

## 2023-11-26 ENCOUNTER — RX RENEWAL (OUTPATIENT)
Age: 73
End: 2023-11-26

## 2023-11-26 RX ORDER — OMEPRAZOLE 40 MG/1
40 CAPSULE, DELAYED RELEASE ORAL
Qty: 90 | Refills: 3 | Status: ACTIVE | COMMUNITY
Start: 2018-01-09 | End: 1900-01-01

## 2023-11-26 RX ORDER — ROSUVASTATIN CALCIUM 20 MG/1
20 TABLET, FILM COATED ORAL
Qty: 90 | Refills: 3 | Status: ACTIVE | COMMUNITY
Start: 2019-06-11 | End: 1900-01-01

## 2023-12-29 ENCOUNTER — APPOINTMENT (OUTPATIENT)
Dept: RHEUMATOLOGY | Facility: CLINIC | Age: 73
End: 2023-12-29

## 2024-01-03 ENCOUNTER — APPOINTMENT (OUTPATIENT)
Dept: RHEUMATOLOGY | Facility: CLINIC | Age: 74
End: 2024-01-03
Payer: MEDICARE

## 2024-01-03 VITALS
HEART RATE: 71 BPM | OXYGEN SATURATION: 96 % | TEMPERATURE: 97.7 F | DIASTOLIC BLOOD PRESSURE: 68 MMHG | SYSTOLIC BLOOD PRESSURE: 148 MMHG | RESPIRATION RATE: 18 BRPM

## 2024-01-03 PROCEDURE — 96372 THER/PROPH/DIAG INJ SC/IM: CPT

## 2024-01-11 ENCOUNTER — RX RENEWAL (OUTPATIENT)
Age: 74
End: 2024-01-11

## 2024-01-11 RX ORDER — CHLORHEXIDINE GLUCONATE 4 %
325 (65 FE) LIQUID (ML) TOPICAL
Qty: 30 | Refills: 5 | Status: ACTIVE | COMMUNITY
Start: 2023-06-21 | End: 1900-01-01

## 2024-03-14 ENCOUNTER — APPOINTMENT (OUTPATIENT)
Dept: RHEUMATOLOGY | Facility: CLINIC | Age: 74
End: 2024-03-14
Payer: MEDICARE

## 2024-03-14 VITALS
WEIGHT: 172 LBS | BODY MASS INDEX: 27.64 KG/M2 | HEIGHT: 66 IN | DIASTOLIC BLOOD PRESSURE: 86 MMHG | SYSTOLIC BLOOD PRESSURE: 179 MMHG | RESPIRATION RATE: 16 BRPM | HEART RATE: 77 BPM | OXYGEN SATURATION: 96 % | TEMPERATURE: 97.2 F

## 2024-03-14 DIAGNOSIS — S32.020S WEDGE COMPRESSION FRACTURE OF SECOND LUMBAR VERTEBRA, SEQUELA: ICD-10-CM

## 2024-03-14 DIAGNOSIS — I73.00 RAYNAUD'S SYNDROME W/OUT GANGRENE: ICD-10-CM

## 2024-03-14 DIAGNOSIS — Z79.899 OTHER LONG TERM (CURRENT) DRUG THERAPY: ICD-10-CM

## 2024-03-14 DIAGNOSIS — M15.9 POLYOSTEOARTHRITIS, UNSPECIFIED: ICD-10-CM

## 2024-03-14 DIAGNOSIS — M81.0 AGE-RELATED OSTEOPOROSIS W/OUT CURRENT PATHOLOGICAL FRACTURE: ICD-10-CM

## 2024-03-14 DIAGNOSIS — R76.8 OTHER SPECIFIED ABNORMAL IMMUNOLOGICAL FINDINGS IN SERUM: ICD-10-CM

## 2024-03-14 DIAGNOSIS — S32.030S WEDGE COMPRESSION FRACTURE OF THIRD LUMBAR VERTEBRA, SEQUELA: ICD-10-CM

## 2024-03-14 DIAGNOSIS — M34.9 SYSTEMIC SCLEROSIS, UNSPECIFIED: ICD-10-CM

## 2024-03-14 PROCEDURE — G2211 COMPLEX E/M VISIT ADD ON: CPT

## 2024-03-14 PROCEDURE — 99214 OFFICE O/P EST MOD 30 MIN: CPT

## 2024-03-14 RX ORDER — APIXABAN 5 MG/1
5 TABLET, FILM COATED ORAL TWICE DAILY
Refills: 0 | Status: ACTIVE | COMMUNITY

## 2024-03-18 PROBLEM — M34.9 DIFFUSE SYSTEMIC SCLEROSIS: Status: ACTIVE | Noted: 2019-11-01

## 2024-03-18 PROBLEM — S32.030S COMPRESSION FRACTURE OF L3 LUMBAR VERTEBRA, SEQUELA: Status: ACTIVE | Noted: 2021-10-08

## 2024-03-18 PROBLEM — S32.020S COMPRESSION FRACTURE OF L2, SEQUELA: Status: ACTIVE | Noted: 2021-10-08

## 2024-03-18 PROBLEM — Z79.899 LONG-TERM USE OF HYDROXYCHLOROQUINE: Status: ACTIVE | Noted: 2024-03-18

## 2024-03-18 PROBLEM — M15.9 PRIMARY OSTEOARTHRITIS INVOLVING MULTIPLE JOINTS: Status: ACTIVE | Noted: 2019-10-17

## 2024-03-18 PROBLEM — R76.8 SCL-70 ANTIBODY POSITIVE: Status: ACTIVE | Noted: 2019-11-01

## 2024-03-28 ENCOUNTER — RX RENEWAL (OUTPATIENT)
Age: 74
End: 2024-03-28

## 2024-04-30 ENCOUNTER — RX RENEWAL (OUTPATIENT)
Age: 74
End: 2024-04-30

## 2024-04-30 RX ORDER — HYDROXYCHLOROQUINE SULFATE 200 MG/1
200 TABLET, FILM COATED ORAL
Qty: 30 | Refills: 5 | Status: ACTIVE | COMMUNITY
Start: 2021-02-12 | End: 1900-01-01

## 2024-05-22 ENCOUNTER — RX RENEWAL (OUTPATIENT)
Age: 74
End: 2024-05-22

## 2024-05-22 RX ORDER — DILTIAZEM HYDROCHLORIDE 180 MG/1
180 CAPSULE, EXTENDED RELEASE ORAL
Qty: 90 | Refills: 3 | Status: ACTIVE | COMMUNITY
Start: 2021-03-19 | End: 1900-01-01

## 2024-06-04 RX ORDER — OXYCODONE AND ACETAMINOPHEN 7.5; 325 MG/1; MG/1
7.5-325 TABLET ORAL
Qty: 120 | Refills: 0 | Status: ACTIVE | COMMUNITY
Start: 2021-11-24 | End: 1900-01-01

## 2024-06-07 RX ORDER — DENOSUMAB 60 MG/ML
60 INJECTION SUBCUTANEOUS
Qty: 1 | Refills: 0 | Status: COMPLETED
Start: 2023-06-29

## 2024-06-26 ENCOUNTER — RX RENEWAL (OUTPATIENT)
Age: 74
End: 2024-06-26

## 2024-07-03 RX ORDER — DENOSUMAB 60 MG/ML
60 INJECTION SUBCUTANEOUS
Refills: 0 | Status: COMPLETED | OUTPATIENT
Start: 2024-07-03 | End: 1900-01-01

## 2024-07-08 ENCOUNTER — APPOINTMENT (OUTPATIENT)
Dept: RHEUMATOLOGY | Facility: CLINIC | Age: 74
End: 2024-07-08
Payer: MEDICARE

## 2024-07-08 VITALS
RESPIRATION RATE: 18 BRPM | HEART RATE: 77 BPM | DIASTOLIC BLOOD PRESSURE: 70 MMHG | TEMPERATURE: 98.2 F | SYSTOLIC BLOOD PRESSURE: 127 MMHG | OXYGEN SATURATION: 98 %

## 2024-07-08 PROCEDURE — 96372 THER/PROPH/DIAG INJ SC/IM: CPT

## 2024-07-08 RX ORDER — DENOSUMAB 60 MG/ML
60 INJECTION SUBCUTANEOUS
Qty: 1 | Refills: 0 | Status: COMPLETED
Start: 2024-07-03

## 2024-07-09 LAB
25(OH)D3 SERPL-MCNC: 27.8 NG/ML
ALBUMIN SERPL ELPH-MCNC: 4.5 G/DL
ALP BLD-CCNC: 58 U/L
ALT SERPL-CCNC: 20 U/L
ANION GAP SERPL CALC-SCNC: 16 MMOL/L
AST SERPL-CCNC: 18 U/L
BILIRUB SERPL-MCNC: 0.3 MG/DL
BUN SERPL-MCNC: 23 MG/DL
CALCIUM SERPL-MCNC: 10.4 MG/DL
CHLORIDE SERPL-SCNC: 106 MMOL/L
CO2 SERPL-SCNC: 23 MMOL/L
CREAT SERPL-MCNC: 0.97 MG/DL
EGFR: 61 ML/MIN/1.73M2
GLUCOSE SERPL-MCNC: 96 MG/DL
POTASSIUM SERPL-SCNC: 5.1 MMOL/L
PROT SERPL-MCNC: 7.4 G/DL
SODIUM SERPL-SCNC: 146 MMOL/L

## 2024-09-16 ENCOUNTER — APPOINTMENT (OUTPATIENT)
Dept: RHEUMATOLOGY | Facility: CLINIC | Age: 74
End: 2024-09-16

## 2024-09-16 VITALS — SYSTOLIC BLOOD PRESSURE: 116 MMHG | HEART RATE: 76 BPM | DIASTOLIC BLOOD PRESSURE: 68 MMHG | OXYGEN SATURATION: 95 %

## 2024-09-16 DIAGNOSIS — M34.9 SYSTEMIC SCLEROSIS, UNSPECIFIED: ICD-10-CM

## 2024-09-16 PROCEDURE — 99214 OFFICE O/P EST MOD 30 MIN: CPT

## 2024-09-16 PROCEDURE — G2211 COMPLEX E/M VISIT ADD ON: CPT

## 2024-09-30 PROBLEM — Z51.81 ENCOUNTER FOR MONITORING DENOSUMAB THERAPY: Status: ACTIVE | Noted: 2024-09-30

## 2024-09-30 PROBLEM — M15.0 PRIMARY OSTEOARTHRITIS INVOLVING MULTIPLE JOINTS: Status: ACTIVE | Noted: 2019-10-17

## 2024-11-01 ENCOUNTER — APPOINTMENT (OUTPATIENT)
Dept: FAMILY MEDICINE | Facility: CLINIC | Age: 74
End: 2024-11-01
Payer: MEDICARE

## 2024-11-01 ENCOUNTER — LABORATORY RESULT (OUTPATIENT)
Age: 74
End: 2024-11-01

## 2024-11-01 VITALS
TEMPERATURE: 98.2 F | DIASTOLIC BLOOD PRESSURE: 72 MMHG | BODY MASS INDEX: 27.64 KG/M2 | RESPIRATION RATE: 16 BRPM | WEIGHT: 172 LBS | OXYGEN SATURATION: 100 % | SYSTOLIC BLOOD PRESSURE: 146 MMHG | HEART RATE: 88 BPM | HEIGHT: 66 IN

## 2024-11-01 DIAGNOSIS — K21.9 GASTRO-ESOPHAGEAL REFLUX DISEASE W/OUT ESOPHAGITIS: ICD-10-CM

## 2024-11-01 DIAGNOSIS — M54.50 LOW BACK PAIN, UNSPECIFIED: ICD-10-CM

## 2024-11-01 DIAGNOSIS — Z71.85 ENCOUNTER FOR IMMUNIZATION SAFETY COUNSELING: ICD-10-CM

## 2024-11-01 DIAGNOSIS — Z23 ENCOUNTER FOR IMMUNIZATION: ICD-10-CM

## 2024-11-01 DIAGNOSIS — E11.9 TYPE 2 DIABETES MELLITUS W/OUT COMPLICATIONS: ICD-10-CM

## 2024-11-01 DIAGNOSIS — M34.9 SYSTEMIC SCLEROSIS, UNSPECIFIED: ICD-10-CM

## 2024-11-01 DIAGNOSIS — I48.91 UNSPECIFIED ATRIAL FIBRILLATION: ICD-10-CM

## 2024-11-01 PROCEDURE — G0008: CPT

## 2024-11-01 PROCEDURE — 99214 OFFICE O/P EST MOD 30 MIN: CPT

## 2024-11-01 PROCEDURE — G2211 COMPLEX E/M VISIT ADD ON: CPT

## 2024-11-01 PROCEDURE — 90662 IIV NO PRSV INCREASED AG IM: CPT

## 2024-11-03 PROBLEM — Z71.85 VACCINE COUNSELING: Status: ACTIVE | Noted: 2017-10-19

## 2024-11-10 LAB
25(OH)D3 SERPL-MCNC: 26.8 NG/ML
ALBUMIN SERPL ELPH-MCNC: 4.6 G/DL
ALP BLD-CCNC: 53 U/L
ALT SERPL-CCNC: 27 U/L
ANION GAP SERPL CALC-SCNC: 17 MMOL/L
AST SERPL-CCNC: 22 U/L
BASOPHILS # BLD AUTO: 0.08 K/UL
BASOPHILS NFR BLD AUTO: 0.7 %
BILIRUB SERPL-MCNC: 0.3 MG/DL
BUN SERPL-MCNC: 29 MG/DL
CALCIUM SERPL-MCNC: 10.4 MG/DL
CHLORIDE SERPL-SCNC: 105 MMOL/L
CHOLEST SERPL-MCNC: 117 MG/DL
CO2 SERPL-SCNC: 23 MMOL/L
CREAT SERPL-MCNC: 0.97 MG/DL
EGFR: 61 ML/MIN/1.73M2
EOSINOPHIL # BLD AUTO: 0.19 K/UL
EOSINOPHIL NFR BLD AUTO: 1.7 %
ESTIMATED AVERAGE GLUCOSE: 117 MG/DL
FERRITIN SERPL-MCNC: 68 NG/ML
GLUCOSE SERPL-MCNC: 90 MG/DL
HBA1C MFR BLD HPLC: 5.7 %
HCT VFR BLD CALC: 47 %
HDLC SERPL-MCNC: 41 MG/DL
HGB BLD-MCNC: 13.6 G/DL
IMM GRANULOCYTES NFR BLD AUTO: 0.4 %
LDLC SERPL CALC-MCNC: 46 MG/DL
LYMPHOCYTES # BLD AUTO: 1.86 K/UL
LYMPHOCYTES NFR BLD AUTO: 16.3 %
MAN DIFF?: NORMAL
MCHC RBC-ENTMCNC: 28.9 G/DL
MCHC RBC-ENTMCNC: 29.1 PG
MCV RBC AUTO: 100.6 FL
MONOCYTES # BLD AUTO: 1.02 K/UL
MONOCYTES NFR BLD AUTO: 8.9 %
NEUTROPHILS # BLD AUTO: 8.2 K/UL
NEUTROPHILS NFR BLD AUTO: 72 %
NONHDLC SERPL-MCNC: 76 MG/DL
PLATELET # BLD AUTO: 347 K/UL
POTASSIUM SERPL-SCNC: 5.4 MMOL/L
PROT SERPL-MCNC: 7.5 G/DL
RBC # BLD: 4.67 M/UL
RBC # FLD: 15.9 %
SODIUM SERPL-SCNC: 145 MMOL/L
T3RU NFR SERPL: 1 TBI
T4 FREE SERPL-MCNC: 1.3 NG/DL
TRIGL SERPL-MCNC: 189 MG/DL
TSH SERPL-ACNC: 1.71 UIU/ML
WBC # FLD AUTO: 11.4 K/UL

## 2024-11-19 ENCOUNTER — RX RENEWAL (OUTPATIENT)
Age: 74
End: 2024-11-19

## 2024-11-26 DIAGNOSIS — E87.5 HYPERKALEMIA: ICD-10-CM

## 2024-12-02 DIAGNOSIS — E78.5 HYPERLIPIDEMIA, UNSPECIFIED: ICD-10-CM

## 2024-12-02 DIAGNOSIS — I48.91 UNSPECIFIED ATRIAL FIBRILLATION: ICD-10-CM

## 2024-12-02 DIAGNOSIS — E11.9 TYPE 2 DIABETES MELLITUS W/OUT COMPLICATIONS: ICD-10-CM

## 2024-12-04 ENCOUNTER — LABORATORY RESULT (OUTPATIENT)
Age: 74
End: 2024-12-04

## 2024-12-06 LAB
25(OH)D3 SERPL-MCNC: 25 NG/ML
ALBUMIN SERPL ELPH-MCNC: 4.5 G/DL
ALP BLD-CCNC: 56 U/L
ALT SERPL-CCNC: 16 U/L
ANION GAP SERPL CALC-SCNC: 16 MMOL/L
APPEARANCE: CLEAR
AST SERPL-CCNC: 16 U/L
BASOPHILS # BLD AUTO: 0.09 K/UL
BASOPHILS NFR BLD AUTO: 0.6 %
BILIRUB SERPL-MCNC: 0.3 MG/DL
BILIRUBIN URINE: NEGATIVE
BLOOD URINE: NEGATIVE
BUN SERPL-MCNC: 27 MG/DL
CALCIUM SERPL-MCNC: 9.9 MG/DL
CHLORIDE SERPL-SCNC: 101 MMOL/L
CHOLEST SERPL-MCNC: 108 MG/DL
CO2 SERPL-SCNC: 20 MMOL/L
COLOR: YELLOW
CREAT SERPL-MCNC: 0.87 MG/DL
CREAT SPEC-SCNC: 36 MG/DL
EGFR: 69 ML/MIN/1.73M2
EOSINOPHIL # BLD AUTO: 0.29 K/UL
EOSINOPHIL NFR BLD AUTO: 2 %
ESTIMATED AVERAGE GLUCOSE: 120 MG/DL
FERRITIN SERPL-MCNC: 71 NG/ML
GLUCOSE QUALITATIVE U: NEGATIVE MG/DL
GLUCOSE SERPL-MCNC: 105 MG/DL
HBA1C MFR BLD HPLC: 5.8 %
HCT VFR BLD CALC: 42 %
HDLC SERPL-MCNC: 40 MG/DL
HGB BLD-MCNC: 12.6 G/DL
IMM GRANULOCYTES NFR BLD AUTO: 0.5 %
KETONES URINE: NEGATIVE MG/DL
LDLC SERPL CALC-MCNC: 31 MG/DL
LEUKOCYTE ESTERASE URINE: ABNORMAL
LYMPHOCYTES # BLD AUTO: 2.68 K/UL
LYMPHOCYTES NFR BLD AUTO: 18.6 %
MAN DIFF?: NORMAL
MCHC RBC-ENTMCNC: 28.8 PG
MCHC RBC-ENTMCNC: 30 G/DL
MCV RBC AUTO: 95.9 FL
MICROALBUMIN 24H UR DL<=1MG/L-MCNC: 5.6 MG/DL
MICROALBUMIN/CREAT 24H UR-RTO: 157 MG/G
MONOCYTES # BLD AUTO: 1.29 K/UL
MONOCYTES NFR BLD AUTO: 9 %
NEUTROPHILS # BLD AUTO: 9.95 K/UL
NEUTROPHILS NFR BLD AUTO: 69.3 %
NITRITE URINE: NEGATIVE
NONHDLC SERPL-MCNC: 68 MG/DL
PH URINE: 5
PLATELET # BLD AUTO: 337 K/UL
POTASSIUM SERPL-SCNC: 4.5 MMOL/L
PROT SERPL-MCNC: 7.4 G/DL
PROTEIN URINE: NEGATIVE MG/DL
RBC # BLD: 4.38 M/UL
RBC # FLD: 14.6 %
SODIUM SERPL-SCNC: 137 MMOL/L
SPECIFIC GRAVITY URINE: 1.01
T3RU NFR SERPL: 1.1 TBI
T4 FREE SERPL-MCNC: 1.2 NG/DL
TRIGL SERPL-MCNC: 243 MG/DL
TSH SERPL-ACNC: 1.33 UIU/ML
UROBILINOGEN URINE: 0.2 MG/DL
WBC # FLD AUTO: 14.37 K/UL

## 2024-12-10 DIAGNOSIS — M34.9 SYSTEMIC SCLEROSIS, UNSPECIFIED: ICD-10-CM

## 2025-01-09 RX ORDER — DENOSUMAB 60 MG/ML
60 INJECTION SUBCUTANEOUS
Refills: 0 | Status: ACTIVE | OUTPATIENT
Start: 2025-01-09

## 2025-01-13 ENCOUNTER — APPOINTMENT (OUTPATIENT)
Dept: RHEUMATOLOGY | Facility: CLINIC | Age: 75
End: 2025-01-13

## 2025-01-28 ENCOUNTER — APPOINTMENT (OUTPATIENT)
Dept: RHEUMATOLOGY | Facility: CLINIC | Age: 75
End: 2025-01-28
Payer: MEDICARE

## 2025-01-28 DIAGNOSIS — Z51.81 ENCOUNTER FOR THERAPEUTIC DRUG LVL MONITORING: ICD-10-CM

## 2025-01-28 DIAGNOSIS — R76.8 OTHER SPECIFIED ABNORMAL IMMUNOLOGICAL FINDINGS IN SERUM: ICD-10-CM

## 2025-01-28 DIAGNOSIS — Z79.620 ENCOUNTER FOR THERAPEUTIC DRUG LVL MONITORING: ICD-10-CM

## 2025-01-28 DIAGNOSIS — M81.0 AGE-RELATED OSTEOPOROSIS W/OUT CURRENT PATHOLOGICAL FRACTURE: ICD-10-CM

## 2025-01-28 DIAGNOSIS — M34.9 SYSTEMIC SCLEROSIS, UNSPECIFIED: ICD-10-CM

## 2025-01-28 DIAGNOSIS — Z79.899 OTHER LONG TERM (CURRENT) DRUG THERAPY: ICD-10-CM

## 2025-01-28 DIAGNOSIS — Z98.890 OTHER SPECIFIED POSTPROCEDURAL STATES: ICD-10-CM

## 2025-01-28 DIAGNOSIS — I73.00 RAYNAUD'S SYNDROME W/OUT GANGRENE: ICD-10-CM

## 2025-01-28 DIAGNOSIS — I27.20 PULMONARY HYPERTENSION, UNSPECIFIED: ICD-10-CM

## 2025-01-28 DIAGNOSIS — S32.020S WEDGE COMPRESSION FRACTURE OF SECOND LUMBAR VERTEBRA, SEQUELA: ICD-10-CM

## 2025-01-28 DIAGNOSIS — S32.030S WEDGE COMPRESSION FRACTURE OF THIRD LUMBAR VERTEBRA, SEQUELA: ICD-10-CM

## 2025-01-28 DIAGNOSIS — K21.9 GASTRO-ESOPHAGEAL REFLUX DISEASE W/OUT ESOPHAGITIS: ICD-10-CM

## 2025-01-28 PROCEDURE — 99214 OFFICE O/P EST MOD 30 MIN: CPT | Mod: 25

## 2025-01-28 PROCEDURE — 96372 THER/PROPH/DIAG INJ SC/IM: CPT

## 2025-02-04 RX ORDER — DENOSUMAB 60 MG/ML
60 INJECTION SUBCUTANEOUS
Qty: 1 | Refills: 0 | Status: COMPLETED
Start: 2025-01-09

## 2025-02-24 ENCOUNTER — INPATIENT (INPATIENT)
Facility: HOSPITAL | Age: 75
LOS: 3 days | Discharge: HOME | DRG: 896 | End: 2025-02-28
Attending: STUDENT IN AN ORGANIZED HEALTH CARE EDUCATION/TRAINING PROGRAM | Admitting: INTERNAL MEDICINE
Payer: MEDICARE

## 2025-02-24 VITALS
RESPIRATION RATE: 18 BRPM | HEART RATE: 76 BPM | TEMPERATURE: 98 F | HEIGHT: 68 IN | WEIGHT: 164.91 LBS | SYSTOLIC BLOOD PRESSURE: 111 MMHG | DIASTOLIC BLOOD PRESSURE: 58 MMHG | OXYGEN SATURATION: 94 %

## 2025-02-24 LAB
ALBUMIN SERPL ELPH-MCNC: 4.2 G/DL — SIGNIFICANT CHANGE UP (ref 3.3–5.2)
ALP SERPL-CCNC: 64 U/L — SIGNIFICANT CHANGE UP (ref 40–120)
ALT FLD-CCNC: 49 U/L — HIGH
ANION GAP SERPL CALC-SCNC: 15 MMOL/L — SIGNIFICANT CHANGE UP (ref 5–17)
AST SERPL-CCNC: 34 U/L — HIGH
BASOPHILS # BLD AUTO: 0.03 K/UL — SIGNIFICANT CHANGE UP (ref 0–0.2)
BASOPHILS NFR BLD AUTO: 0.4 % — SIGNIFICANT CHANGE UP (ref 0–2)
BILIRUB SERPL-MCNC: 0.3 MG/DL — LOW (ref 0.4–2)
BUN SERPL-MCNC: 28.6 MG/DL — HIGH (ref 8–20)
CALCIUM SERPL-MCNC: 9.6 MG/DL — SIGNIFICANT CHANGE UP (ref 8.4–10.5)
CHLORIDE SERPL-SCNC: 105 MMOL/L — SIGNIFICANT CHANGE UP (ref 96–108)
CO2 SERPL-SCNC: 23 MMOL/L — SIGNIFICANT CHANGE UP (ref 22–29)
CREAT SERPL-MCNC: 0.83 MG/DL — SIGNIFICANT CHANGE UP (ref 0.5–1.3)
EGFR: 73 ML/MIN/1.73M2 — SIGNIFICANT CHANGE UP
EOSINOPHIL # BLD AUTO: 0.06 K/UL — SIGNIFICANT CHANGE UP (ref 0–0.5)
EOSINOPHIL NFR BLD AUTO: 0.7 % — SIGNIFICANT CHANGE UP (ref 0–6)
GLUCOSE SERPL-MCNC: 96 MG/DL — SIGNIFICANT CHANGE UP (ref 70–99)
HCT VFR BLD CALC: 42.5 % — SIGNIFICANT CHANGE UP (ref 34.5–45)
HGB BLD-MCNC: 13.2 G/DL — SIGNIFICANT CHANGE UP (ref 11.5–15.5)
IMM GRANULOCYTES # BLD AUTO: 0.03 K/UL — SIGNIFICANT CHANGE UP (ref 0–0.07)
IMM GRANULOCYTES NFR BLD AUTO: 0.4 % — SIGNIFICANT CHANGE UP (ref 0–0.9)
LYMPHOCYTES # BLD AUTO: 1.55 K/UL — SIGNIFICANT CHANGE UP (ref 1–3.3)
LYMPHOCYTES NFR BLD AUTO: 19 % — SIGNIFICANT CHANGE UP (ref 13–44)
MCHC RBC-ENTMCNC: 28.3 PG — SIGNIFICANT CHANGE UP (ref 27–34)
MCHC RBC-ENTMCNC: 31.1 G/DL — LOW (ref 32–36)
MCV RBC AUTO: 91.2 FL — SIGNIFICANT CHANGE UP (ref 80–100)
MONOCYTES # BLD AUTO: 0.69 K/UL — SIGNIFICANT CHANGE UP (ref 0–0.9)
MONOCYTES NFR BLD AUTO: 8.4 % — SIGNIFICANT CHANGE UP (ref 2–14)
NEUTROPHILS # BLD AUTO: 5.81 K/UL — SIGNIFICANT CHANGE UP (ref 1.8–7.4)
NEUTROPHILS NFR BLD AUTO: 71.1 % — SIGNIFICANT CHANGE UP (ref 43–77)
NRBC # BLD AUTO: 0 K/UL — SIGNIFICANT CHANGE UP (ref 0–0)
NRBC # FLD: 0 K/UL — SIGNIFICANT CHANGE UP (ref 0–0)
NRBC BLD AUTO-RTO: 0 /100 WBCS — SIGNIFICANT CHANGE UP (ref 0–0)
PLATELET # BLD AUTO: 272 K/UL — SIGNIFICANT CHANGE UP (ref 150–400)
PMV BLD: 9.6 FL — SIGNIFICANT CHANGE UP (ref 7–13)
POTASSIUM SERPL-MCNC: 4.4 MMOL/L — SIGNIFICANT CHANGE UP (ref 3.5–5.3)
POTASSIUM SERPL-SCNC: 4.4 MMOL/L — SIGNIFICANT CHANGE UP (ref 3.5–5.3)
PROT SERPL-MCNC: 7.5 G/DL — SIGNIFICANT CHANGE UP (ref 6.6–8.7)
RBC # BLD: 4.66 M/UL — SIGNIFICANT CHANGE UP (ref 3.8–5.2)
RBC # FLD: 15.1 % — HIGH (ref 10.3–14.5)
SODIUM SERPL-SCNC: 142 MMOL/L — SIGNIFICANT CHANGE UP (ref 135–145)
TROPONIN T, HIGH SENSITIVITY RESULT: 11 NG/L — SIGNIFICANT CHANGE UP (ref 0–51)
TROPONIN T, HIGH SENSITIVITY RESULT: 11 NG/L — SIGNIFICANT CHANGE UP (ref 0–51)
WBC # BLD: 8.17 K/UL — SIGNIFICANT CHANGE UP (ref 3.8–10.5)
WBC # FLD AUTO: 8.17 K/UL — SIGNIFICANT CHANGE UP (ref 3.8–10.5)

## 2025-02-24 PROCEDURE — 70498 CT ANGIOGRAPHY NECK: CPT | Mod: 26

## 2025-02-24 PROCEDURE — 70450 CT HEAD/BRAIN W/O DYE: CPT | Mod: 26,XU

## 2025-02-24 PROCEDURE — 70496 CT ANGIOGRAPHY HEAD: CPT | Mod: 26

## 2025-02-24 PROCEDURE — 99285 EMERGENCY DEPT VISIT HI MDM: CPT | Mod: GC

## 2025-02-24 PROCEDURE — 71046 X-RAY EXAM CHEST 2 VIEWS: CPT | Mod: 26

## 2025-02-24 RX ORDER — DILTIAZEM HYDROCHLORIDE 240 MG/1
180 TABLET, EXTENDED RELEASE ORAL DAILY
Refills: 0 | Status: DISCONTINUED | OUTPATIENT
Start: 2025-02-24 | End: 2025-02-28

## 2025-02-24 RX ORDER — APIXABAN 2.5 MG/1
5 TABLET, FILM COATED ORAL
Refills: 0 | Status: DISCONTINUED | OUTPATIENT
Start: 2025-02-24 | End: 2025-02-26

## 2025-02-24 RX ORDER — ROSUVASTATIN CALCIUM 20 MG/1
20 TABLET, FILM COATED ORAL AT BEDTIME
Refills: 0 | Status: DISCONTINUED | OUTPATIENT
Start: 2025-02-24 | End: 2025-02-28

## 2025-02-24 RX ORDER — METOPROLOL SUCCINATE 50 MG/1
50 TABLET, EXTENDED RELEASE ORAL
Refills: 0 | Status: DISCONTINUED | OUTPATIENT
Start: 2025-02-24 | End: 2025-02-25

## 2025-02-24 RX ORDER — ASPIRIN 325 MG
81 TABLET ORAL DAILY
Refills: 0 | Status: DISCONTINUED | OUTPATIENT
Start: 2025-02-24 | End: 2025-02-28

## 2025-02-24 RX ORDER — ACETAMINOPHEN 500 MG/5ML
650 LIQUID (ML) ORAL ONCE
Refills: 0 | Status: COMPLETED | OUTPATIENT
Start: 2025-02-24 | End: 2025-02-24

## 2025-02-24 RX ORDER — METFORMIN HYDROCHLORIDE 850 MG/1
500 TABLET ORAL
Refills: 0 | Status: DISCONTINUED | OUTPATIENT
Start: 2025-02-24 | End: 2025-02-25

## 2025-02-24 RX ORDER — MECLIZINE HCL 12.5 MG
50 TABLET ORAL ONCE
Refills: 0 | Status: COMPLETED | OUTPATIENT
Start: 2025-02-24 | End: 2025-02-24

## 2025-02-24 RX ORDER — LOSARTAN POTASSIUM 100 MG/1
100 TABLET, FILM COATED ORAL DAILY
Refills: 0 | Status: DISCONTINUED | OUTPATIENT
Start: 2025-02-24 | End: 2025-02-25

## 2025-02-24 RX ADMIN — Medication 50 MILLIGRAM(S): at 16:37

## 2025-02-24 RX ADMIN — Medication 650 MILLIGRAM(S): at 22:36

## 2025-02-24 NOTE — ED PROVIDER NOTE - WET READ LAUNCH FT
There are no Wet Read(s) to document. There is 1 Wet Read(s) to document. Tremfya Counseling: I discussed with the patient the risks of guselkumab including but not limited to immunosuppression, serious infections, worsening of inflammatory bowel disease and drug reactions.  The patient understands that monitoring is required including a PPD at baseline and must alert us or the primary physician if symptoms of infection or other concerning signs are noted.

## 2025-02-24 NOTE — CONSULT NOTE ADULT - PROBLEM SELECTOR RECOMMENDATION 9
Continue to monitor on telemetry   - Troponin 11-->11 ; ECG: NSR @ 73bpm   - Check orthostatic vital signs   - Pending TTE for evaluation of cardiac function, WMA, and any valvular abnormalities   - CT Head to r/o acute neuro pathology    - Consider IVF for gentle hydration   - Replete electrolytes as needed; Maintain K > 4 and Mg > 2  - May need Holter vs MCOT for further evaluation of arrhythmias   - Consider carotid doppler; consider neuro evaluation Continue to monitor on telemetry   - Troponin 11-->11 ; ECG: NSR @ 73bpm   - Pt states sx significantly improved with meclizine  - Pending TTE for evaluation of cardiac function, WMA, and any valvular abnormalities   - CT Head & CTA H/N performed: No acute intracranial hemorrhage, mass effect, or midline shift. Mild stenosis of 35%, at the origin of the left internal carotid artery.   - Consider IVF for gentle hydration   - Check orthostatic vital signs   - Replete electrolytes as needed; Maintain K > 4 and Mg > 2  - May need Holter vs MCOT for further evaluation of arrhythmias     Assessment and recommendations are final when note is signed by the attending. Continue to monitor on telemetry   - Troponin 11-->11 ; ECG: NSR @ 73bpm   - Pt states sx significantly improved with meclizine  - Pending TTE for evaluation of cardiac function, WMA, and any valvular abnormalities   - CT Head & CTA H/N performed: No acute intracranial hemorrhage, mass effect, or midline shift. Mild stenosis of 35%, at the origin of the left internal carotid artery.   - Consider IVF for gentle hydration   - Check orthostatic vital signs   - Replete electrolytes as needed; Maintain K > 4 and Mg > 2  - May consider Holter vs MCOT for further evaluation of arrhythmias; currently SR    Assessment and recommendations are final when note is signed by the attending. Continue to monitor on telemetry   - Troponin 11-->11 ; ECG: NSR @ 73bpm   - Pt states sx significantly improved with meclizine  - Pending TTE for evaluation of cardiac function, WMA, and any valvular abnormalities   - CT Head & CTA H/N performed: No acute intracranial hemorrhage, mass effect, or midline shift. Mild stenosis of 35%, at the origin of the left internal carotid artery.   - Consider IVF for gentle hydration   - Check orthostatic vital signs   - Replete electrolytes as needed; Maintain K > 4 and Mg > 2  - May consider Holter vs MCOT if concern for arrhythmias; currently SR    Assessment and recommendations are final when note is signed by the attending.

## 2025-02-24 NOTE — ED ADULT TRIAGE NOTE - CHIEF COMPLAINT QUOTE
CELE complaining of inconsistent dizziness x1 month. for the past 4-5 days patient has difficulty ambulating stating " I feel a lot of head pressure, and dizziness". Patient on Eliquis.

## 2025-02-24 NOTE — ED PROVIDER NOTE - NSICDXPASTMEDICALHX_GEN_ALL_CORE_FT
Instructed patient/caregiver regarding signs and symptoms of infection./Verbal/written post procedure instructions were given to patient/caregiver./Keep the cast/splint/dressing clean and dry./Instructed patient/caregiver to follow-up with primary care physician. PAST MEDICAL HISTORY:  Afib     Diabetes     Hypertension

## 2025-02-24 NOTE — CONSULT NOTE ADULT - ASSESSMENT
74 y.o. female with PMHx of AFib s/p ablation, DM, HLD, HTN, scleroderma, carotid artery stenosis (<50%) & retrograde flow in left vertebral presents with dizziness. Pt states she has intermittent symptoms of feeling "off balance" that have become constant over the past 4-5 days. Pt states symptoms are worse with movement and only alleviated by laying down flat. Pt states symptoms feel different compared to prior vertigo episodes. Of note, pt with mild to moderate atherosclerosis and retrograde flow of left vertebral artery.       74 y.o. female with PMHx of AFib s/p ablation, DM, HLD, HTN, scleroderma, carotid artery stenosis (<50%) & retrograde flow in left vertebral presents with dizziness. Pt states she has intermittent symptoms of feeling "off balance" that have become constant over the past 4-5 days. Pt states symptoms are worse with movement and only alleviated by laying down flat. Pt states symptoms are more constant compared to prior vertigo episodes. In ED, pt received meclizine with improvement in symptoms. Pt denies chest pain, back pain, headache, SOB, LARSON, diaphoresis, syncope or N/V.

## 2025-02-24 NOTE — CONSULT NOTE ADULT - NS ATTEND BILL GEN_ALL_CORE
Successfully faxed physical therapy paperwork to [facility] at [phone] -ns    Attending to bill

## 2025-02-24 NOTE — CONSULT NOTE ADULT - SUBJECTIVE AND OBJECTIVE BOX
[facility]                                              CARDIOLOGY AT [address], [place] [address]                                             Telephone: [phone]. Fax:[phone]                                                       CARDIOLOGY CONSULTATION NOTE                                                                  INCOMPLETE                           History obtained by: Patient and medical record  Community Cardiologist:    obtained: Yes [  ] No [ x ]  Reason for Consultation: SOB  Available out pt records reviewed: Yes [ x ] No [  ]    Chief complaint:    Patient is a 74y old  Female who presents with a chief complaint of     HPI:  74 y.o. female with PMHx of AFib s/p ablation, DM, HLD, HTN, scleroderma, carotid artery stenosis (<50%) & retrograde flow in left vertebral presents with dizziness. Pt states she has intermittent symptoms of feeling "off balance" that have become constant over the past 4-5 days. Pt states symptoms are worse with movement and only alleviated by laying down flat. Pt states symptoms feel different compared to prior vertigo episodes. Of note, pt with mild to moderate atherosclerosis and retrograde flow of left vertebral artery. Pt denies chest pain, back pain, headache, SOB, LARSON, diaphoresis, syncope or N/V.     CARDIAC TESTING   ECHO: Pending    Outpatient echo performed 4/3/2024:  LVEF 60%, no WMA  Mod AS, mild to mod AR  Mod MS, mild MR  Mild LA enlargement     PAST MEDICAL HISTORY  Afib  Hypertension  Diabetes  Hyperlipidemia    PAST SURGICAL HISTORY      SOCIAL HISTORY:    CIGARETTES:   Former  ALCOHOL:  DRUGS:    FAMILY HISTORY:    Family History of Cardiovascular Disease:  Yes [  ] No [  ]  Coronary Artery Disease in first degree relative: Yes [  ] No [  ]  Sudden Cardiac Death in First degree relative: Yes [  ] No [  ]    HOME MEDICATIONS:  acetaminophen 325 mg oral tablet: 2 tab(s) orally every 6 hours, As needed, Mild Pain (1 - 3) (05 Aug 2020 09:43)  allopurinol 100 mg oral tablet: 1 tab(s) orally once a day (29 Jul 2020 20:52)  aspirin 81 mg oral delayed release tablet: 1 tab(s) orally once a day (29 Jul 2020 20:52)  atorvastatin 80 mg oral tablet: 1 tab(s) orally once a day (at bedtime) (05 Aug 2020 09:43)  dilTIAZem 180 mg/24 hours oral capsule, extended release: 1 cap(s) orally once a day (29 Jul 2020 20:52)  Eliquis 5 mg oral tablet: 1 tab(s) orally 2 times a day (29 Jul 2020 20:52)  glipiZIDE 5 mg oral tablet: 1 tab(s) orally once a day (29 Jul 2020 20:52)  metFORMIN 500 mg oral tablet: 1 tab(s) orally 2 times a day (29 Jul 2020 20:52)  metoprolol tartrate 50 mg oral tablet: 1 tab(s) orally 2 times a day (29 Jul 2020 20:52)  nortriptyline 10 mg oral capsule: 1-2 cap(s) orally once a day at bedtime (29 Jul 2020 20:52)  olmesartan-hydrochlorothiazide 40 mg-25 mg oral tablet: 1 tab(s) orally once a day (29 Jul 2020 20:52)  rosuvastatin 20 mg oral tablet: 1 tab(s) orally once a day (29 Jul 2020 20:52)  traMADol 50 mg oral tablet: 1 tab(s) orally every 4 hours, As needed, Severe Pain (7 - 10) (05 Aug 2020 09:43)      CURRENT CARDIAC MEDICATIONS:      CURRENT OTHER MEDICATIONS:      ALLERGIES:   No Known Allergies      REVIEW OF SYMPTOMS:   CONSTITUTIONAL: No fever, no chills, no weight loss, no weight gain, no fatigue   ENMT:  No vertigo; No sinus or throat pain  NECK: No pain or stiffness  CARDIOVASCULAR: No chest pain, no dyspnea, no syncope/presyncope, no palpitations, no dizziness, no orthopnea, no paroxsymal nocturnal dyspnea  RESPIRATORY: No shortness of breath, no cough, no wheezing  : No dysuria, no hematuria   GI: No dark color stool, no nausea, no diarrhea, no constipation, no abdominal pain   NEURO: No headache, no slurred speech   MUSCULOSKELETAL: No joint pain or swelling; No muscle, back, or extremity pain  PSYCH: No agitation, no anxiety  ALL OTHER REVIEW OF SYSTEMS ARE NEGATIVE.    VITAL SIGNS:  T(C): 36.6 (02-24-25 @ 16:34), Max: 36.6 (02-24-25 @ 16:34)  T(F): 97.8 (02-24-25 @ 16:34), Max: 97.8 (02-24-25 @ 16:34)  HR: 77 (02-24-25 @ 16:34) (76 - 77)  BP: 138/61 (02-24-25 @ 16:34) (111/58 - 138/61)  RR: 18 (02-24-25 @ 16:34) (18 - 18)  SpO2: 95% (02-24-25 @ 16:34) (94% - 95%)    INTAKE AND OUTPUT:       PHYSICAL EXAM:  Constitutional: Comfortable. No acute distress.   HEENT: Atraumatic and normocephalic, neck is supple. No JVD. No carotid bruit.  CNS: A&Ox3. No focal deficits.   Respiratory: CTAB, unlabored   Cardiovascular: RRR normal S1 S2. No murmur. No rubs or gallop.  Gastrointestinal: Soft, non-tender. +Bowel sounds.   Extremities: 2+ Peripheral Pulses, No clubbing, cyanosis, or edema  Psychiatric: Calm. No agitation.   Skin: Warm and dry, no ulcers on extremities     LABS:                     13.2   8.17  )-----------( 272      ( 24 Feb 2025 16:25 )             42.5     02-24    142  |  105  |  28.6[H]  ----------------------------<  96  4.4   |  23.0  |  0.83    Ca    9.6      24 Feb 2025 16:25    TPro  7.5  /  Alb  4.2  /  TBili  0.3[L]  /  DBili  x   /  AST  34[H]  /  ALT  49[H]  /  AlkPhos  64  02-24      Urinalysis Basic - ( 24 Feb 2025 16:25 )    Color: x / Appearance: x / SG: x / pH: x  Gluc: 96 mg/dL / Ketone: x  / Bili: x / Urobili: x   Blood: x / Protein: x / Nitrite: x   Leuk Esterase: x / RBC: x / WBC x   Sq Epi: x / Non Sq Epi: x / Bacteria: x      INTERPRETATION OF TELEMETRY:     ECG: NSR @ 73bpm  Prior ECG: Yes [ x ] No [  ]    RADIOLOGY & ADDITIONAL STUDIES:   X-ray:    CT scan:   MRI:   US:                                              [facility]                                              CARDIOLOGY AT [place] [address]                                             Telephone: [phone]. Fax:[phone]                                                       CARDIOLOGY CONSULTATION NOTE                                                                    History obtained by: Patient and medical record  Community Cardiologist: Dr. Mason   obtained: Yes [  ] No [ x ]  Reason for Consultation: Dizziness  Available out pt records reviewed: Yes [ x ] No [  ]    Chief complaint:    Patient is a 74y old  Female who presents with a chief complaint of     HPI:  74 y.o. female with PMHx of AFib s/p ablation, DM, HLD, HTN, scleroderma, carotid artery stenosis (<50%) & retrograde flow in left vertebral presents with dizziness. Pt states she has intermittent symptoms of feeling "off balance" that have become constant over the past 4-5 days. Pt states symptoms are worse with movement and only alleviated by laying down flat. Pt states symptoms are more constant compared to prior vertigo episodes. In ED, pt received meclizine with improvement in symptoms. Pt denies chest pain, back pain, headache, SOB, LARSON, diaphoresis, syncope or N/V.    CARDIAC TESTING   ECHO: Pending    Outpatient echo performed 4/3/2024:  LVEF 60%, no WMA  Mod AS, mild to mod AR  Mod MS, mild MR  Mild LA enlargement     PAST MEDICAL HISTORY  Afib  Hypertension  Diabetes  Hyperlipidemia    PAST SURGICAL HISTORY      SOCIAL HISTORY:    CIGARETTES:   Former smoker; quit in 2008; prior 1PPD x 30 years  ALCOHOL:    Denies  DRUGS:    Denies    FAMILY HISTORY:  Mother: CABG    Family History of Cardiovascular Disease:  Yes [ x ] No [  ]  Coronary Artery Disease in first degree relative: Yes [ x ] No [  ]  Sudden Cardiac Death in First degree relative: Yes [  ] No [ x ]    HOME MEDICATIONS:  acetaminophen 325 mg oral tablet: 2 tab(s) orally every 6 hours, As needed, Mild Pain (1 - 3) (05 Aug 2020 09:43)  allopurinol 100 mg oral tablet: 1 tab(s) orally once a day (29 Jul 2020 20:52)  aspirin 81 mg oral delayed release tablet: 1 tab(s) orally once a day (29 Jul 2020 20:52)  atorvastatin 80 mg oral tablet: 1 tab(s) orally once a day (at bedtime) (05 Aug 2020 09:43)  dilTIAZem 180 mg/24 hours oral capsule, extended release: 1 cap(s) orally once a day (29 Jul 2020 20:52)  Eliquis 5 mg oral tablet: 1 tab(s) orally 2 times a day (29 Jul 2020 20:52)  glipiZIDE 5 mg oral tablet: 1 tab(s) orally once a day (29 Jul 2020 20:52)  metFORMIN 500 mg oral tablet: 1 tab(s) orally 2 times a day (29 Jul 2020 20:52)  metoprolol tartrate 50 mg oral tablet: 1 tab(s) orally 2 times a day (29 Jul 2020 20:52)  nortriptyline 10 mg oral capsule: 1-2 cap(s) orally once a day at bedtime (29 Jul 2020 20:52)  olmesartan-hydrochlorothiazide 40 mg-25 mg oral tablet: 1 tab(s) orally once a day (29 Jul 2020 20:52)  rosuvastatin 20 mg oral tablet: 1 tab(s) orally once a day (29 Jul 2020 20:52)  traMADol 50 mg oral tablet: 1 tab(s) orally every 4 hours, As needed, Severe Pain (7 - 10) (05 Aug 2020 09:43)      CURRENT CARDIAC MEDICATIONS:      CURRENT OTHER MEDICATIONS:      ALLERGIES:   No Known Allergies      REVIEW OF SYMPTOMS:   CONSTITUTIONAL: No fever, no chills, no weight loss, no weight gain, no fatigue   ENMT: No sinus or throat pain  NECK: No pain or stiffness  CARDIOVASCULAR: No chest pain, no dyspnea, no syncope/presyncope, no palpitations, no dizziness, no orthopnea, no paroxsymal nocturnal dyspnea  RESPIRATORY: No shortness of breath, no cough, no wheezing  : No dysuria, no hematuria   GI: No dark color stool, no nausea, no diarrhea, no constipation, no abdominal pain   NEURO: No headache, no slurred speech   MUSCULOSKELETAL: No joint pain or swelling; No muscle, back, or extremity pain  PSYCH: No agitation, no anxiety  ALL OTHER REVIEW OF SYSTEMS ARE NEGATIVE.    VITAL SIGNS:  T(C): 36.6 (02-24-25 @ 16:34), Max: 36.6 (02-24-25 @ 16:34)  T(F): 97.8 (02-24-25 @ 16:34), Max: 97.8 (02-24-25 @ 16:34)  HR: 77 (02-24-25 @ 16:34) (76 - 77)  BP: 138/61 (02-24-25 @ 16:34) (111/58 - 138/61)  RR: 18 (02-24-25 @ 16:34) (18 - 18)  SpO2: 95% (02-24-25 @ 16:34) (94% - 95%)    INTAKE AND OUTPUT:       PHYSICAL EXAM:  Constitutional: Comfortable. No acute distress.   HEENT: Atraumatic and normocephalic, neck is supple. No JVD. No carotid bruit.  CNS: A&Ox3. No focal deficits.   Respiratory: CTAB, unlabored   Cardiovascular: RRR normal S1 S2. No murmur. No rubs or gallop.  Gastrointestinal: Soft, non-tender. +Bowel sounds.   Extremities: 2+ Peripheral Pulses, No clubbing, cyanosis, or edema  Psychiatric: Calm. No agitation.   Skin: Warm and dry, no ulcers on extremities     LABS:                     13.2   8.17  )-----------( 272      ( 24 Feb 2025 16:25 )             42.5     02-24    142  |  105  |  28.6[H]  ----------------------------<  96  4.4   |  23.0  |  0.83    Ca    9.6      24 Feb 2025 16:25    TPro  7.5  /  Alb  4.2  /  TBili  0.3[L]  /  DBili  x   /  AST  34[H]  /  ALT  49[H]  /  AlkPhos  64  02-24      Urinalysis Basic - ( 24 Feb 2025 16:25 )    Color: x / Appearance: x / SG: x / pH: x  Gluc: 96 mg/dL / Ketone: x  / Bili: x / Urobili: x   Blood: x / Protein: x / Nitrite: x   Leuk Esterase: x / RBC: x / WBC x   Sq Epi: x / Non Sq Epi: x / Bacteria: x      INTERPRETATION OF TELEMETRY:     ECG: NSR @ 73bpm  Prior ECG: Yes [ x ] No [  ]    RADIOLOGY & ADDITIONAL STUDIES:   X-ray:    CT scan:   MRI:   US:                                              [facility]                                              CARDIOLOGY AT [place] [address]                                             Telephone: [phone]. Fax:[phone]                                                       CARDIOLOGY CONSULTATION NOTE                                                                    History obtained by: Patient and medical record  Community Cardiologist: Dr. Mason   obtained: Yes [  ] No [ x ]  Reason for Consultation: Dizziness  Available out pt records reviewed: Yes [ x ] No [  ]    Chief complaint:    Patient is a 74y old  Female who presents with a chief complaint of     HPI:  74 y.o. female with PMHx of AFib s/p ablation, DM, HLD, HTN, scleroderma, carotid artery stenosis (<50%) & retrograde flow in left vertebral presents with dizziness. Pt states she has intermittent symptoms of feeling "off balance" that have become constant over the past 4-5 days. Pt states symptoms are worse with movement and only alleviated by laying down flat. Pt states symptoms are more constant compared to prior vertigo episodes. In ED, pt received meclizine with improvement in symptoms. Pt denies chest pain, back pain, headache, SOB, LARSON, diaphoresis, syncope or N/V.    CARDIAC TESTING   ECHO: Pending    Outpatient echo performed 4/3/2024:  LVEF 60%, no WMA  Mod AS, mild to mod AR  Mod MS, mild MR  Mild LA enlargement     PAST MEDICAL HISTORY  Afib  Hypertension  Diabetes  Hyperlipidemia    PAST SURGICAL HISTORY      SOCIAL HISTORY:    CIGARETTES:   Former smoker; quit in 2008; prior 1PPD x 30 years  ALCOHOL:    Denies  DRUGS:    Denies    FAMILY HISTORY:  Mother: CABG    Family History of Cardiovascular Disease:  Yes [ x ] No [  ]  Coronary Artery Disease in first degree relative: Yes [ x ] No [  ]  Sudden Cardiac Death in First degree relative: Yes [  ] No [ x ]    HOME MEDICATIONS:  acetaminophen 325 mg oral tablet: 2 tab(s) orally every 6 hours, As needed, Mild Pain (1 - 3) (05 Aug 2020 09:43)  allopurinol 100 mg oral tablet: 1 tab(s) orally once a day (29 Jul 2020 20:52)  aspirin 81 mg oral delayed release tablet: 1 tab(s) orally once a day (29 Jul 2020 20:52)  atorvastatin 80 mg oral tablet: 1 tab(s) orally once a day (at bedtime) (05 Aug 2020 09:43)  dilTIAZem 180 mg/24 hours oral capsule, extended release: 1 cap(s) orally once a day (29 Jul 2020 20:52)  Eliquis 5 mg oral tablet: 1 tab(s) orally 2 times a day (29 Jul 2020 20:52)  glipiZIDE 5 mg oral tablet: 1 tab(s) orally once a day (29 Jul 2020 20:52)  metFORMIN 500 mg oral tablet: 1 tab(s) orally 2 times a day (29 Jul 2020 20:52)  metoprolol tartrate 50 mg oral tablet: 1 tab(s) orally 2 times a day (29 Jul 2020 20:52)  nortriptyline 10 mg oral capsule: 1-2 cap(s) orally once a day at bedtime (29 Jul 2020 20:52)  olmesartan-hydrochlorothiazide 40 mg-25 mg oral tablet: 1 tab(s) orally once a day (29 Jul 2020 20:52)  rosuvastatin 20 mg oral tablet: 1 tab(s) orally once a day (29 Jul 2020 20:52)  traMADol 50 mg oral tablet: 1 tab(s) orally every 4 hours, As needed, Severe Pain (7 - 10) (05 Aug 2020 09:43)      CURRENT CARDIAC MEDICATIONS:      CURRENT OTHER MEDICATIONS:      ALLERGIES:   No Known Allergies      REVIEW OF SYMPTOMS:   CONSTITUTIONAL: No fever, no chills, no weight loss, no weight gain, no fatigue   ENMT: No sinus or throat pain  NECK: No pain or stiffness  CARDIOVASCULAR: +Dizziness. No chest pain, no dyspnea, no syncope/presyncope, no palpitations, no orthopnea, no paroxsymal nocturnal dyspnea  RESPIRATORY: No shortness of breath, no cough, no wheezing  : No dysuria, no hematuria   GI: No dark color stool, no nausea, no diarrhea, no constipation, no abdominal pain   NEURO: No headache, no slurred speech   MUSCULOSKELETAL: No joint pain or swelling; No muscle, back, or extremity pain  PSYCH: No agitation, no anxiety  ALL OTHER REVIEW OF SYSTEMS ARE NEGATIVE.    VITAL SIGNS:  T(C): 36.6 (02-24-25 @ 16:34), Max: 36.6 (02-24-25 @ 16:34)  T(F): 97.8 (02-24-25 @ 16:34), Max: 97.8 (02-24-25 @ 16:34)  HR: 77 (02-24-25 @ 16:34) (76 - 77)  BP: 138/61 (02-24-25 @ 16:34) (111/58 - 138/61)  RR: 18 (02-24-25 @ 16:34) (18 - 18)  SpO2: 95% (02-24-25 @ 16:34) (94% - 95%)    INTAKE AND OUTPUT:       PHYSICAL EXAM:  Constitutional: Comfortable. No acute distress.   HEENT: Atraumatic and normocephalic, neck is supple. No JVD. No carotid bruit.  CNS: A&Ox3. No focal deficits.   Respiratory: CTAB, unlabored   Cardiovascular: RRR normal S1 S2. No murmur. No rubs or gallop.  Gastrointestinal: Soft, non-tender. +Bowel sounds.   Extremities: 2+ Peripheral Pulses, No clubbing, cyanosis, or edema  Psychiatric: Calm. No agitation.   Skin: Warm and dry, no ulcers on extremities     LABS:                     13.2   8.17  )-----------( 272      ( 24 Feb 2025 16:25 )             42.5     02-24    142  |  105  |  28.6[H]  ----------------------------<  96  4.4   |  23.0  |  0.83    Ca    9.6      24 Feb 2025 16:25    TPro  7.5  /  Alb  4.2  /  TBili  0.3[L]  /  DBili  x   /  AST  34[H]  /  ALT  49[H]  /  AlkPhos  64  02-24      Urinalysis Basic - ( 24 Feb 2025 16:25 )    Color: x / Appearance: x / SG: x / pH: x  Gluc: 96 mg/dL / Ketone: x  / Bili: x / Urobili: x   Blood: x / Protein: x / Nitrite: x   Leuk Esterase: x / RBC: x / WBC x   Sq Epi: x / Non Sq Epi: x / Bacteria: x      INTERPRETATION OF TELEMETRY:     ECG: NSR @ 73bpm  Prior ECG: Yes [ x ] No [  ]    RADIOLOGY & ADDITIONAL STUDIES:   X-ray:    CT scan:   < from: CT Head No Cont (02.24.25 @ 20:26) >  IMPRESSION:    CT HEAD:  No acute intracranial hemorrhage, mass effect, or midline shift. If   headaches persist, consider further evaluation via MR imaging to include   DWI and ADC mapping techniques, provided there are no contraindications.    CTA NECK:  1. Mild stenosis of 35%, at the origin of the left internal carotid   artery, based on NASCET criteria.  2. No evidence of significant stenosis or occlusion in association with   the right internal carotid artery or bilateral vertebral arteries..    CTA HEAD:  1. No large vessel occlusion.  2.Deposition of calcified plaque in association with the cavernous and   supraclinoid portions of the bilateral internal carotid arteries with   mild to moderate stenosis bilaterally in these locations.  3. No aneurysm identified. Tiny aneurysms can be beyond the resolution of   CTA technique.    --- End of Report ---    < end of copied text >    MRI:   US:

## 2025-02-24 NOTE — ED PROVIDER NOTE - ATTENDING CONTRIBUTION TO CARE
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  Patient states she is able to suppress symptoms while she is laying flat but when she sits or stands and tries to walk she feels off balance.  No room spinning dizziness.  Says she had an abnormality with carotid arteries in the past which her cardiologist is concerned about.     PHYSICAL EXAM:   General: well-appearing, appears stated age, not in extremis   HEENT: NC/AT, PERRLA, EOMI. ?b/l fatigable nystagmus, airway patent  Cardiovascular: regular rate and rhythm, + S1/S2, no murmurs, rubs, gallops appreciated  Respiratory: clear to auscultation bilaterally, good aeration bilaterally, nonlabored respirations  Neuro: Alert and oriented x3. CN2-12 intact, Strength 5/5 in upper and lower extremities. Sensation intact to light touch in upper and lower extremities. finger-to-nose  WNL.  Psychiatric: appropriate mood and affect.   -Tammie Elizabeth MD Attending Physician     EKG normal sinus rhythm normal axis normal intervals no acute diagnostic ischemic changes. Given improvement in symptoms with laying flat, symptoms more likely to be peripheral in etiology.  However given age and carotid abnormality, will get CT CTA.  Will also involve cardiology given previous abnormality in carotids reported by patient.  plan for observation.  Patient signed out to oncoming provider pending CT and cardiology.

## 2025-02-24 NOTE — ED PROVIDER NOTE - OBJECTIVE STATEMENT
73yo F PMHx Afib s/p ablation, T2DM, HTN, system sclerosis and vertigo presents to the ED for dizziness, Patient describes the dizziness as head pressure that is positional, feels "off balance" intermittently, but over the last 4-5days states that the dizziness has been constant and only suppressed by laying down flat. Patient states that she was told that she has an issue with her left carotid, "it goes backwards" and that she should go to the ED if her symptoms worsens. No chest pain. no shortness of breath. no fever. Patient states that the dizziness does not cause the room to spin, and does not feel like her vertigo symptoms in the past.

## 2025-02-24 NOTE — ED PROVIDER NOTE - PHYSICAL EXAMINATION
Gen: well appearing, no acute distress  Head: normocephalic, atraumatic  EENT: EOMI, moist mucous membranes, no scleral icterus  Lung: no increased work of breathing, clear to auscultation bilaterally, no wheezing, rales, rhonchi, speaking in full sentences  CV: regular rate, regular rhythm, normal s1/s2, 2+ radial pulses bilaterally  Abd: soft, non-tender, non-distended,    MSK: No edema, no visible deformities, full range of motion in all 4 extremities  Neuro: Awake, alert, no focal neurologic deficits, unable to elicit nystagmus   Skin: No obvious rash, no jaundice  Psych: normal affect, normal speech

## 2025-02-24 NOTE — ED PROVIDER NOTE - CLINICAL SUMMARY MEDICAL DECISION MAKING FREE TEXT BOX
75yo F PMHx Afib s/p ablation, T2DM, HTN, system sclerosis and vertigo presents to the ED for positional head pressure for the last 4-5 days. Ordered CTA neck and head, CT head non con, cbc, cmp

## 2025-02-24 NOTE — ED ADULT NURSE REASSESSMENT NOTE - NS ED NURSE REASSESS COMMENT FT1
Assumed care of pt from SC RN at 1930. Pt is resting comfortably in stretcher. NAD. Pt is A&Ox4. Respirations are even and unlabored. Pt awaiting CT scan and cardiology consult. Plan on going.

## 2025-02-25 ENCOUNTER — RESULT REVIEW (OUTPATIENT)
Age: 75
End: 2025-02-25

## 2025-02-25 DIAGNOSIS — R41.82 ALTERED MENTAL STATUS, UNSPECIFIED: ICD-10-CM

## 2025-02-25 DIAGNOSIS — R42 DIZZINESS AND GIDDINESS: ICD-10-CM

## 2025-02-25 LAB
ALBUMIN SERPL ELPH-MCNC: 4.2 G/DL — SIGNIFICANT CHANGE UP (ref 3.3–5.2)
ALP SERPL-CCNC: 64 U/L — SIGNIFICANT CHANGE UP (ref 40–120)
ALT FLD-CCNC: 48 U/L — HIGH
AMPHET UR-MCNC: NEGATIVE — SIGNIFICANT CHANGE UP
ANION GAP SERPL CALC-SCNC: 20 MMOL/L — HIGH (ref 5–17)
APAP SERPL-MCNC: <3 UG/ML — LOW (ref 10–26)
APPEARANCE UR: CLEAR — SIGNIFICANT CHANGE UP
AST SERPL-CCNC: 35 U/L — HIGH
BACTERIA # UR AUTO: ABNORMAL /HPF
BARBITURATES UR SCN-MCNC: NEGATIVE — SIGNIFICANT CHANGE UP
BASOPHILS # BLD AUTO: 0.05 K/UL — SIGNIFICANT CHANGE UP (ref 0–0.2)
BASOPHILS NFR BLD AUTO: 0.4 % — SIGNIFICANT CHANGE UP (ref 0–2)
BENZODIAZ UR-MCNC: NEGATIVE — SIGNIFICANT CHANGE UP
BILIRUB SERPL-MCNC: 0.5 MG/DL — SIGNIFICANT CHANGE UP (ref 0.4–2)
BILIRUB UR-MCNC: NEGATIVE — SIGNIFICANT CHANGE UP
BUN SERPL-MCNC: 25.4 MG/DL — HIGH (ref 8–20)
CALCIUM SERPL-MCNC: 9.9 MG/DL — SIGNIFICANT CHANGE UP (ref 8.4–10.5)
CAST: 0 /LPF — SIGNIFICANT CHANGE UP (ref 0–4)
CHLORIDE SERPL-SCNC: 102 MMOL/L — SIGNIFICANT CHANGE UP (ref 96–108)
CO2 SERPL-SCNC: 19 MMOL/L — LOW (ref 22–29)
COCAINE METAB.OTHER UR-MCNC: NEGATIVE — SIGNIFICANT CHANGE UP
COLOR SPEC: YELLOW — SIGNIFICANT CHANGE UP
CREAT SERPL-MCNC: 0.79 MG/DL — SIGNIFICANT CHANGE UP (ref 0.5–1.3)
DIFF PNL FLD: ABNORMAL
EGFR: 78 ML/MIN/1.73M2 — SIGNIFICANT CHANGE UP
EOSINOPHIL # BLD AUTO: 0.06 K/UL — SIGNIFICANT CHANGE UP (ref 0–0.5)
EOSINOPHIL NFR BLD AUTO: 0.5 % — SIGNIFICANT CHANGE UP (ref 0–6)
FENTANYL UR QL SCN: NEGATIVE — SIGNIFICANT CHANGE UP
GLUCOSE BLDC GLUCOMTR-MCNC: 104 MG/DL — HIGH (ref 70–99)
GLUCOSE BLDC GLUCOMTR-MCNC: 115 MG/DL — HIGH (ref 70–99)
GLUCOSE BLDC GLUCOMTR-MCNC: 117 MG/DL — HIGH (ref 70–99)
GLUCOSE BLDC GLUCOMTR-MCNC: 118 MG/DL — HIGH (ref 70–99)
GLUCOSE SERPL-MCNC: 120 MG/DL — HIGH (ref 70–99)
GLUCOSE UR QL: NEGATIVE MG/DL — SIGNIFICANT CHANGE UP
HCT VFR BLD CALC: 43.6 % — SIGNIFICANT CHANGE UP (ref 34.5–45)
HGB BLD-MCNC: 13.3 G/DL — SIGNIFICANT CHANGE UP (ref 11.5–15.5)
IMM GRANULOCYTES # BLD AUTO: 0.05 K/UL — SIGNIFICANT CHANGE UP (ref 0–0.07)
IMM GRANULOCYTES NFR BLD AUTO: 0.4 % — SIGNIFICANT CHANGE UP (ref 0–0.9)
KETONES UR-MCNC: 15 MG/DL
LEUKOCYTE ESTERASE UR-ACNC: NEGATIVE — SIGNIFICANT CHANGE UP
LYMPHOCYTES # BLD AUTO: 1.81 K/UL — SIGNIFICANT CHANGE UP (ref 1–3.3)
LYMPHOCYTES NFR BLD AUTO: 15.3 % — SIGNIFICANT CHANGE UP (ref 13–44)
MCHC RBC-ENTMCNC: 27.3 PG — SIGNIFICANT CHANGE UP (ref 27–34)
MCHC RBC-ENTMCNC: 30.5 G/DL — LOW (ref 32–36)
MCV RBC AUTO: 89.5 FL — SIGNIFICANT CHANGE UP (ref 80–100)
METHADONE UR-MCNC: NEGATIVE — SIGNIFICANT CHANGE UP
MONOCYTES # BLD AUTO: 0.97 K/UL — HIGH (ref 0–0.9)
MONOCYTES NFR BLD AUTO: 8.2 % — SIGNIFICANT CHANGE UP (ref 2–14)
NEUTROPHILS # BLD AUTO: 8.87 K/UL — HIGH (ref 1.8–7.4)
NEUTROPHILS NFR BLD AUTO: 75.2 % — SIGNIFICANT CHANGE UP (ref 43–77)
NITRITE UR-MCNC: POSITIVE
NRBC # BLD AUTO: 0 K/UL — SIGNIFICANT CHANGE UP (ref 0–0)
NRBC # FLD: 0 K/UL — SIGNIFICANT CHANGE UP (ref 0–0)
NRBC BLD AUTO-RTO: 0 /100 WBCS — SIGNIFICANT CHANGE UP (ref 0–0)
OPIATES UR-MCNC: NEGATIVE — SIGNIFICANT CHANGE UP
PCP SPEC-MCNC: SIGNIFICANT CHANGE UP
PCP UR-MCNC: NEGATIVE — SIGNIFICANT CHANGE UP
PH UR: 5.5 — SIGNIFICANT CHANGE UP (ref 5–8)
PLATELET # BLD AUTO: 293 K/UL — SIGNIFICANT CHANGE UP (ref 150–400)
PMV BLD: 9.6 FL — SIGNIFICANT CHANGE UP (ref 7–13)
POTASSIUM SERPL-MCNC: 4 MMOL/L — SIGNIFICANT CHANGE UP (ref 3.5–5.3)
POTASSIUM SERPL-SCNC: 4 MMOL/L — SIGNIFICANT CHANGE UP (ref 3.5–5.3)
PROT SERPL-MCNC: 7.6 G/DL — SIGNIFICANT CHANGE UP (ref 6.6–8.7)
PROT UR-MCNC: 30 MG/DL
RAPID RVP RESULT: SIGNIFICANT CHANGE UP
RBC # BLD: 4.87 M/UL — SIGNIFICANT CHANGE UP (ref 3.8–5.2)
RBC # FLD: 14.7 % — HIGH (ref 10.3–14.5)
RBC CASTS # UR COMP ASSIST: 1 /HPF — SIGNIFICANT CHANGE UP (ref 0–4)
SARS-COV-2 RNA SPEC QL NAA+PROBE: SIGNIFICANT CHANGE UP
SODIUM SERPL-SCNC: 141 MMOL/L — SIGNIFICANT CHANGE UP (ref 135–145)
SP GR SPEC: >1.03 — HIGH (ref 1–1.03)
SQUAMOUS # UR AUTO: 1 /HPF — SIGNIFICANT CHANGE UP (ref 0–5)
T3 SERPL-MCNC: 123 NG/DL — SIGNIFICANT CHANGE UP (ref 80–200)
T4 AB SER-ACNC: 10.3 UG/DL — SIGNIFICANT CHANGE UP (ref 4.5–12)
T4 FREE SERPL-MCNC: 1.5 NG/DL — SIGNIFICANT CHANGE UP (ref 0.9–1.7)
THC UR QL: NEGATIVE — SIGNIFICANT CHANGE UP
TSH SERPL-MCNC: 1.44 UIU/ML — SIGNIFICANT CHANGE UP (ref 0.27–4.2)
UROBILINOGEN FLD QL: 0.2 MG/DL — SIGNIFICANT CHANGE UP (ref 0.2–1)
WBC # BLD: 11.81 K/UL — HIGH (ref 3.8–10.5)
WBC # FLD AUTO: 11.81 K/UL — HIGH (ref 3.8–10.5)
WBC UR QL: 2 /HPF — SIGNIFICANT CHANGE UP (ref 0–5)

## 2025-02-25 PROCEDURE — 99223 1ST HOSP IP/OBS HIGH 75: CPT | Mod: GC

## 2025-02-25 PROCEDURE — 93306 TTE W/DOPPLER COMPLETE: CPT | Mod: 26

## 2025-02-25 PROCEDURE — 70450 CT HEAD/BRAIN W/O DYE: CPT | Mod: 26

## 2025-02-25 PROCEDURE — 99223 1ST HOSP IP/OBS HIGH 75: CPT

## 2025-02-25 RX ORDER — SODIUM CHLORIDE 9 G/1000ML
1000 INJECTION, SOLUTION INTRAVENOUS
Refills: 0 | Status: DISCONTINUED | OUTPATIENT
Start: 2025-02-25 | End: 2025-02-28

## 2025-02-25 RX ORDER — DEXTROSE 50 % IN WATER 50 %
25 SYRINGE (ML) INTRAVENOUS ONCE
Refills: 0 | Status: DISCONTINUED | OUTPATIENT
Start: 2025-02-25 | End: 2025-02-28

## 2025-02-25 RX ORDER — ROSUVASTATIN CALCIUM 20 MG/1
1 TABLET, FILM COATED ORAL
Refills: 0 | DISCHARGE

## 2025-02-25 RX ORDER — OLANZAPINE 10 MG/1
5 TABLET ORAL ONCE
Refills: 0 | Status: COMPLETED | OUTPATIENT
Start: 2025-02-25 | End: 2025-02-25

## 2025-02-25 RX ORDER — HYDROXYCHLOROQUINE SULFATE 200 MG/1
1 TABLET, FILM COATED ORAL
Refills: 0 | DISCHARGE

## 2025-02-25 RX ORDER — CEFTRIAXONE 500 MG/1
1000 INJECTION, POWDER, FOR SOLUTION INTRAMUSCULAR; INTRAVENOUS ONCE
Refills: 0 | Status: DISCONTINUED | OUTPATIENT
Start: 2025-02-25 | End: 2025-02-25

## 2025-02-25 RX ORDER — HALOPERIDOL 10 MG/1
2 TABLET ORAL ONCE
Refills: 0 | Status: COMPLETED | OUTPATIENT
Start: 2025-02-25 | End: 2025-02-25

## 2025-02-25 RX ORDER — ALPRAZOLAM 0.5 MG
1 TABLET, EXTENDED RELEASE 24 HR ORAL ONCE
Refills: 0 | Status: DISCONTINUED | OUTPATIENT
Start: 2025-02-25 | End: 2025-02-25

## 2025-02-25 RX ORDER — CEFTRIAXONE 500 MG/1
1000 INJECTION, POWDER, FOR SOLUTION INTRAMUSCULAR; INTRAVENOUS ONCE
Refills: 0 | Status: COMPLETED | OUTPATIENT
Start: 2025-02-25 | End: 2025-02-25

## 2025-02-25 RX ORDER — DILTIAZEM HYDROCHLORIDE 240 MG/1
1 TABLET, EXTENDED RELEASE ORAL
Refills: 0 | DISCHARGE

## 2025-02-25 RX ORDER — OLMESARTAN MEDOXOMIL 5 MG/1
1 TABLET, FILM COATED ORAL
Refills: 0 | DISCHARGE

## 2025-02-25 RX ORDER — OXYCODONE HYDROCHLORIDE AND ACETAMINOPHEN 10; 325 MG/1; MG/1
1 TABLET ORAL
Refills: 0 | DISCHARGE

## 2025-02-25 RX ORDER — LOSARTAN POTASSIUM 100 MG/1
100 TABLET, FILM COATED ORAL DAILY
Refills: 0 | Status: DISCONTINUED | OUTPATIENT
Start: 2025-02-25 | End: 2025-02-28

## 2025-02-25 RX ORDER — OMEPRAZOLE 20 MG/1
1 CAPSULE, DELAYED RELEASE ORAL
Refills: 0 | DISCHARGE

## 2025-02-25 RX ORDER — HYDROXYCHLOROQUINE SULFATE 200 MG/1
200 TABLET, FILM COATED ORAL DAILY
Refills: 0 | Status: DISCONTINUED | OUTPATIENT
Start: 2025-02-25 | End: 2025-02-28

## 2025-02-25 RX ORDER — METOPROLOL SUCCINATE 50 MG/1
1 TABLET, EXTENDED RELEASE ORAL
Refills: 0 | DISCHARGE

## 2025-02-25 RX ORDER — SODIUM CHLORIDE 9 G/1000ML
1000 INJECTION, SOLUTION INTRAVENOUS
Refills: 0 | Status: DISCONTINUED | OUTPATIENT
Start: 2025-02-25 | End: 2025-02-27

## 2025-02-25 RX ORDER — HYDROCHLOROTHIAZIDE 50 MG/1
1 TABLET ORAL
Refills: 0 | DISCHARGE

## 2025-02-25 RX ORDER — DEXTROSE 50 % IN WATER 50 %
12.5 SYRINGE (ML) INTRAVENOUS ONCE
Refills: 0 | Status: DISCONTINUED | OUTPATIENT
Start: 2025-02-25 | End: 2025-02-28

## 2025-02-25 RX ORDER — DEXTROSE 50 % IN WATER 50 %
15 SYRINGE (ML) INTRAVENOUS ONCE
Refills: 0 | Status: DISCONTINUED | OUTPATIENT
Start: 2025-02-25 | End: 2025-02-28

## 2025-02-25 RX ORDER — METOPROLOL SUCCINATE 50 MG/1
50 TABLET, EXTENDED RELEASE ORAL
Refills: 0 | Status: DISCONTINUED | OUTPATIENT
Start: 2025-02-25 | End: 2025-02-28

## 2025-02-25 RX ORDER — INSULIN LISPRO 100 U/ML
INJECTION, SOLUTION INTRAVENOUS; SUBCUTANEOUS AT BEDTIME
Refills: 0 | Status: DISCONTINUED | OUTPATIENT
Start: 2025-02-25 | End: 2025-02-28

## 2025-02-25 RX ORDER — GLUCAGON 3 MG/1
1 POWDER NASAL ONCE
Refills: 0 | Status: DISCONTINUED | OUTPATIENT
Start: 2025-02-25 | End: 2025-02-28

## 2025-02-25 RX ORDER — INSULIN LISPRO 100 U/ML
INJECTION, SOLUTION INTRAVENOUS; SUBCUTANEOUS
Refills: 0 | Status: DISCONTINUED | OUTPATIENT
Start: 2025-02-25 | End: 2025-02-28

## 2025-02-25 RX ORDER — METFORMIN HYDROCHLORIDE 850 MG/1
1 TABLET ORAL
Refills: 0 | DISCHARGE

## 2025-02-25 RX ORDER — APIXABAN 2.5 MG/1
1 TABLET, FILM COATED ORAL
Refills: 0 | DISCHARGE

## 2025-02-25 RX ORDER — CEFTRIAXONE 500 MG/1
1000 INJECTION, POWDER, FOR SOLUTION INTRAMUSCULAR; INTRAVENOUS EVERY 24 HOURS
Refills: 0 | Status: COMPLETED | OUTPATIENT
Start: 2025-02-26 | End: 2025-02-28

## 2025-02-25 RX ADMIN — CEFTRIAXONE 1000 MILLIGRAM(S): 500 INJECTION, POWDER, FOR SOLUTION INTRAMUSCULAR; INTRAVENOUS at 09:28

## 2025-02-25 RX ADMIN — OLANZAPINE 5 MILLIGRAM(S): 10 TABLET ORAL at 02:05

## 2025-02-25 RX ADMIN — HALOPERIDOL 2 MILLIGRAM(S): 10 TABLET ORAL at 03:44

## 2025-02-25 RX ADMIN — APIXABAN 5 MILLIGRAM(S): 2.5 TABLET, FILM COATED ORAL at 18:17

## 2025-02-25 RX ADMIN — Medication 1000 MILLILITER(S): at 09:28

## 2025-02-25 RX ADMIN — METOPROLOL SUCCINATE 50 MILLIGRAM(S): 50 TABLET, EXTENDED RELEASE ORAL at 18:17

## 2025-02-25 RX ADMIN — SODIUM CHLORIDE 100 MILLILITER(S): 9 INJECTION, SOLUTION INTRAVENOUS at 18:39

## 2025-02-25 RX ADMIN — OLANZAPINE 5 MILLIGRAM(S): 10 TABLET ORAL at 02:48

## 2025-02-25 RX ADMIN — ROSUVASTATIN CALCIUM 20 MILLIGRAM(S): 20 TABLET, FILM COATED ORAL at 20:51

## 2025-02-25 RX ADMIN — Medication 1 MILLIGRAM(S): at 00:52

## 2025-02-25 RX ADMIN — HALOPERIDOL 2 MILLIGRAM(S): 10 TABLET ORAL at 05:27

## 2025-02-25 NOTE — ED CDU PROVIDER SUBSEQUENT DAY NOTE - ATTENDING APP SHARED VISIT CONTRIBUTION OF CARE
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  Patient states she is able to suppress symptoms while she is laying flat but when she sits or stands and tries to walk she feels off balance.  No room spinning dizziness.  Says she had an abnormality with carotid arteries in the past which her cardiologist is concerned about.  ED record reviewed.  Overnight patient became acutely agitated, erratic behavior.  Medicated with Haldol and Zyprexa.  On exam this morning.  Patient is alert and oriented x 2 (said year is 2024) and is still endorsing pressure in her head.  CTA without acute pathology, sinuses clear, mild stenosis in the left ICA.  Repeat stat head CT no acute changes.  U tox negative, UA positive.  Patient admitted for metabolic encephalopathy

## 2025-02-25 NOTE — H&P ADULT - NSHPPHYSICALEXAM_GEN_ALL_CORE
Vital Signs Last 24 Hrs  T(C): 37.1 (25 Feb 2025 12:00), Max: 37.5 (25 Feb 2025 07:09)  T(F): 98.7 (25 Feb 2025 12:00), Max: 99.5 (25 Feb 2025 07:09)  HR: 125 (25 Feb 2025 08:01) (77 - 125)  BP: 123/76 (25 Feb 2025 12:00) (123/76 - 191/65)  BP(mean): --  RR: 22 (25 Feb 2025 08:01) (18 - 22)  SpO2: 94% (25 Feb 2025 12:00) (94% - 95%)    Parameters below as of 25 Feb 2025 08:01  Patient On (Oxygen Delivery Method): room air Vital Signs Last 24 Hrs  T(C): 37.1 (25 Feb 2025 12:00), Max: 37.5 (25 Feb 2025 07:09)  T(F): 98.7 (25 Feb 2025 12:00), Max: 99.5 (25 Feb 2025 07:09)  HR: 125 (25 Feb 2025 08:01) (77 - 125)  BP: 123/76 (25 Feb 2025 12:00) (123/76 - 191/65)  BP(mean): --  RR: 22 (25 Feb 2025 08:01) (18 - 22)  SpO2: 94% (25 Feb 2025 12:00) (94% - 95%)    Parameters below as of 25 Feb 2025 08:01  Patient On (Oxygen Delivery Method): room air    GENERAL: appears to be in some distress, AOx2, restless in bed  HEAD: NC/AT  EYES: PERRLA, EOMI, Non-icteric  ENT: Mucous membranes moist, neck supple  NEURO: No focal deficits, moving all extremities spontaneously, A&Ox3, no dysarthria, CN II-XII grossly intact  PSYCH: Normal affect, calm, appropriate insight and judgment, fluent speech  RESP: CTAB, no wrr, non-labored breathing  CVS: RRR, no murmur appreciated  GI: Soft, non-tender, non-distended, no organomegaly, no appreciable masses, +bs all 4 quadrants  : No nielsen, no suprapubic tenderness  EXTREMITIES: Nontender, no clubbing, cyanosis, or edema  SKIN: Mildly flushed Vital Signs Last 24 Hrs  T(C): 37.1 (25 Feb 2025 12:00), Max: 37.5 (25 Feb 2025 07:09)  T(F): 98.7 (25 Feb 2025 12:00), Max: 99.5 (25 Feb 2025 07:09)  HR: 125 (25 Feb 2025 08:01) (77 - 125)  BP: 123/76 (25 Feb 2025 12:00) (123/76 - 191/65)  BP(mean): --  RR: 22 (25 Feb 2025 08:01) (18 - 22)  SpO2: 94% (25 Feb 2025 12:00) (94% - 95%)    Parameters below as of 25 Feb 2025 08:01  Patient On (Oxygen Delivery Method): room air    GENERAL: appears to be in some distress, AOx2, restless in bed  HEAD: NC/AT  EYES: PERRLA, EOMI, Non-icteric  ENT: Mucous membranes moist, neck supple  NEURO: No focal deficits, moving all extremities spontaneously, A&Ox2, not answering questions  PSYCH: Confused, not following commands  RESP: CTAB, no wrr, non-labored breathing  CVS: RRR, no murmur appreciated  GI: Soft, non-tender, non-distended, no organomegaly, no appreciable masses, +bs all 4 quadrants  : No nielsen, no suprapubic tenderness  EXTREMITIES: Nontender, no clubbing, cyanosis, or edema  SKIN: Mildly flushed

## 2025-02-25 NOTE — CHART NOTE - NSCHARTNOTEFT_GEN_A_CORE
TTe reviewed,  only relevant for severe AS, which could account for pre-syncopal episode. Needs evaluation for AV therapies, yet she is delirious at the moment, please call back when her mental status is at baseline to initiate possible TAVR workup.

## 2025-02-25 NOTE — ED ADULT NURSE REASSESSMENT NOTE - NS ED NURSE REASSESS COMMENT FT1
Pt restless, agitated, moving all over bed. Pt unable to take morning meds at this time as pt medicated per MAR. Per PA Bianka move medications to 8am. Pt noncompliant with cardiac monitor.

## 2025-02-25 NOTE — H&P ADULT - HISTORY OF PRESENT ILLNESS
74F with PMHx of Afib s/p ablation (on Eliquis), T2DM, HTN, systemic sclerosis, and vertigo presented BIBEMS with one month of intermittent dizziness that worsened over 4-5 days. Symptoms were described as positional head pressure and off-balance sensation, improved with lying flat, distinct from her previous vertigo episodes. Initial workup revealed stable troponins (11-->11), NSR on ECG, and CTA showing 35% L ICA stenosis. Patient initially improved with meclizine and was placed on cardiac telemetry for observation. However, overnight patient developed acute altered mental status with agitation requiring Zyprexa and Haldol for management. Morning evaluation revealed patient was A&O x2 with labs showing leukocytosis of 11.8k, elevated anion gap of 20, and UA positive for nitrites. Repeat head CT was negative. Patient was started on IV Rocephin for presumed UTI with delirium and admitted to the hospital for further management. Patient on examination similar to morning evaluation AOx2 however not answering any other questions and limited participation in history.     Note: Unable to confirm home medications. Med rec completed via Dr. First.  74F with PMHx of Afib s/p ablation (on Eliquis), T2DM, HTN, systemic sclerosis, and vertigo presented BIBEMS with one month of intermittent dizziness that worsened over 4-5 days. Symptoms were described as positional head pressure and off-balance sensation, improved with lying flat, distinct from her previous vertigo episodes. Initial workup revealed stable troponins (11-->11), NSR on ECG, and CTA showing 35% L ICA stenosis. Patient initially improved with meclizine and was placed on cardiac telemetry for observation. However, overnight patient developed acute altered mental status with agitation requiring Zyprexa and Haldol for management. Morning evaluation revealed patient was A&O x2 with labs showing leukocytosis of 11.8k, elevated anion gap of 20, and UA positive for nitrites. Repeat head CT was negative. Patient was started on IV Rocephin for presumed UTI with delirium and admitted to the hospital for further management. Patient on examination similar to morning evaluation AOx2 however not answering any other questions and limited participation in history. Denies being in pain.    Note: Unable to confirm home medications. Med rec completed via Dr. First.

## 2025-02-25 NOTE — H&P ADULT - NSHPLABSRESULTS_GEN_ALL_CORE
LABS:                          13.3   11.81 )-----------( 293      ( 25 Feb 2025 05:20 )             43.6     02-25    141  |  102  |  25.4[H]  ----------------------------<  120[H]  4.0   |  19.0[L]  |  0.79    Ca    9.9      25 Feb 2025 05:20    TPro  7.6  /  Alb  4.2  /  TBili  0.5  /  DBili  x   /  AST  35[H]  /  ALT  48[H]  /  AlkPhos  64  02-25    LIVER FUNCTIONS - ( 25 Feb 2025 05:20 )  Alb: 4.2 g/dL / Pro: 7.6 g/dL / ALK PHOS: 64 U/L / ALT: 48 U/L / AST: 35 U/L / GGT: x             Urinalysis Basic - ( 25 Feb 2025 07:05 )    Color: Yellow / Appearance: Clear / SG: >1.030 / pH: x  Gluc: x / Ketone: 15 mg/dL  / Bili: Negative / Urobili: 0.2 mg/dL   Blood: x / Protein: 30 mg/dL / Nitrite: Positive   Leuk Esterase: Negative / RBC: 1 /HPF / WBC 2 /HPF   Sq Epi: x / Non Sq Epi: 1 /HPF / Bacteria: Few /HPF

## 2025-02-25 NOTE — ED ADULT NURSE REASSESSMENT NOTE - NS ED NURSE REASSESS COMMENT FT1
assumed care of pt from night RN ; per RN reports pt increasingly altered, confused, attempting to climb out of bed, unable to follow all commands, restless/agitated. RN states pt was medicated previously for this. pt currently restless, fidgeting with wires, removing cardiac leads, refusing to keep them and  wires on. poor historian. not following commands for oral intake/.dysphagia screen. informed MD about pt oral meds and HR. pt denies pain/ discomfort at this time. pt also removed IV, pending new placement of line; difficult to obtain access. delay in IVF and cultures/abx made known to MD

## 2025-02-25 NOTE — ED CDU PROVIDER DISPOSITION NOTE - NS ED MD TWO NIGHTS YN
Yes 28 yo   History of Epilepsy diagnoses at 8yo last seizure episode 1 month ago (3 seizures during pregnancy) on Keppra and carbamazepine; Type 1 diabetes on insulin; migraine, psoriasis, IBS, diabetic macular edema  Spontaneous pregnancy, normal NIPT and anatomy scan  Prenatal labs negative, GBS unknown, Blood type B negative

## 2025-02-25 NOTE — ED ADULT NURSE REASSESSMENT NOTE - NS ED NURSE REASSESS COMMENT FT1
Pt continues to state she is having a panic attack. Screaming "it hurts". Pt moved by the nurses station for safety. JAYLEN Carlton called to bedside.

## 2025-02-25 NOTE — ED CDU PROVIDER DISPOSITION NOTE - ATTENDING CONTRIBUTION TO CARE
74-year-old female past ministry of [employer] on Eliquis status post ablation, diabetes, hypertension, hyperlipidemia, scleroderma presents with dizziness and feeling off balance associated with head pressure.  Patient states she is able to suppress symptoms while she is laying flat but when she sits or stands and tries to walk she feels off balance.  No room spinning dizziness.  Says she had an abnormality with carotid arteries in the past which her cardiologist is concerned about.  ED record reviewed.  Overnight patient became acutely agitated, erratic behavior.  Medicated with Haldol and Zyprexa.  On exam this morning.  Patient is alert and oriented x 2 (said year is 2024) and is still endorsing pressure in her head.  CTA without acute pathology, sinuses clear, mild stenosis in the left ICA.  Repeat stat head CT no acute changes.  U tox negative, UA positive.  Patient admitted for metabolic encephalopathy

## 2025-02-25 NOTE — H&P ADULT - ASSESSMENT
ASSESSMENT:  74F with PMHx of Afib s/p ablation (on Eliquis), T2DM, HTN, systemic sclerosis, and vertigo presenting with progressive dizziness x1 month, worsening over 4-5 days. Initial workup with CTA showed 35% L ICA stenosis. Course complicated by acute delirium with positive UA, requiring antipsychotics and antibiotics, admitted for further management.    PLAN:   Acute metabolic encephalopathy 2/2 UTI vs Opioid withdrawal ASSESSMENT:  74F with PMHx of Afib s/p ablation (on Eliquis), T2DM, HTN, systemic sclerosis, and vertigo presenting with progressive dizziness x1 month, worsening over 4-5 days. Initial workup with CTA showed 35% L ICA stenosis. Course complicated by acute delirium with positive UA, requiring antipsychotics and antibiotics, admitted for further management.    PLAN:   Acute metabolic encephalopathy 2/2 UTI vs Opioid withdrawal  - Patient initially AOx3, but overnight became more agitated and delirious  - Admit to medicine Telemetry  - CTH negative  - Mild stenosis of left ICA  - UA weakly positive  - F/u cultures  - Utox negative  - RVP negative  - S/p 1 dose of Ceftriaxone   - Mild leukocytosis, trend WBC  - Afebrile, monitor fever curve  - Patient is on Percocet 7.5 last refilled 1/28/2025 as per [facility]  - Patient unable to provide history of opioid usage  - COWS score 8    Dizziness in setting of Carotid stenosis, new onset severe AS  - Troponin 11 -> 11  - EKG with NSR  - Symptoms improved with Meclizine  - TTE with normal EF, incidentally severe AS noted  - Orthostatic BP  - TAVR workup once mental status improves    DM  - Hold home Metformin  - Accuchecks  - Lantus  - ISS  - A1c in AM  - Consistent carb diet    HTN  - Olmesartan -> Losartan    Scleroderma  - Continue Hydroxychloroquine    DVT ppx: Eliquis  Diet: DASH/CC  DISPO: Admit to medicine, pending infectious workup           ASSESSMENT:  74F with PMHx of Afib s/p ablation (on Eliquis), T2DM, HTN, systemic sclerosis, and vertigo presenting with progressive dizziness x1 month, worsening over 4-5 days. Initial workup with CTA showed 35% L ICA stenosis. Course complicated by acute delirium with positive UA, requiring antipsychotics and antibiotics, admitted for further management.    PLAN:   Acute metabolic encephalopathy 2/2 UTI vs Opioid withdrawal  - Patient initially AOx3, but overnight became more agitated and delirious requiring antipsychotics  - Admit to medicine Telemetry  - CTH negative  - Mild stenosis of left ICA  - UA weakly positive  - F/u cultures  - Utox negative  - RVP negative  - S/p 1 dose of Ceftriaxone   - Mild leukocytosis, trend WBC  - Afebrile, monitor fever curve  - Patient is on Percocet 7.5 last refilled 1/28/2025 as per [facility]  - Patient unable to provide history of opioid usage  - COWS score 8, flushed skin, restless, piloerection    Dizziness in setting of Carotid stenosis, new onset severe AS  - Troponin 11 -> 11  - EKG with NSR  - Symptoms improved with Meclizine  - TTE with normal EF, incidentally severe AS noted  - Orthostatic BP  - TAVR workup once mental status improves    A fib  - NSR on Tele  - Continue Lopressor  - Eliquis for AC    DM  - Hold home Metformin  - Accuchecks  - Lantus  - ISS  - A1c in AM  - Consistent carb diet    HTN  - Olmesartan -> Losartan    Scleroderma  - Continue Hydroxychloroquine    DVT ppx: Eliquis  Diet: DASH/CC  DISPO: Admit to medicine, pending infectious workup           ASSESSMENT:  74F with PMHx of Afib s/p ablation (on Eliquis), T2DM, HTN, systemic sclerosis, and vertigo presenting with progressive dizziness x1 month, worsening over 4-5 days. Initial workup with CTA showed 35% L ICA stenosis. Course complicated by acute delirium with positive UA, requiring antipsychotics and antibiotics, admitted for further management.    PLAN:   Acute metabolic encephalopathy 2/2 UTI vs Opioid withdrawal  - Patient initially AOx3, but overnight became more agitated and delirious requiring antipsychotics  - Admit to medicine Telemetry  - CTH negative  - Mild stenosis of left ICA  - UA weakly positive  - F/u cultures  - Utox negative  - RVP negative  - S/p 1 dose of Ceftriaxone   - Mild leukocytosis, trend WBC  - Afebrile, monitor fever curve  - Patient is on Percocet 7.5 last refilled 1/28/2025 as per [facility]  - Patient unable to provide history of opioid usage  - COWS score 8, flushed skin, restless, piloerection    Dizziness in setting of Carotid stenosis, new onset severe AS  - Troponin 11 -> 11  - EKG with NSR  - Symptoms improved with Meclizine  - TTE with normal EF, incidentally severe AS noted  - Orthostatic BP  - TAVR workup once mental status improves    A fib  - NSR on Tele  - Continue Lopressor, Cardizem  - Eliquis for AC    DM  - Hold home Metformin  - Accuchecks  - Lantus  - ISS  - A1c in AM  - Consistent carb diet    HTN  - Olmesartan -> Losartan    Scleroderma  - Continue Hydroxychloroquine    DVT ppx: Eliquis  Diet: DASH/CC  DISPO: Admit to medicine, pending infectious workup           ASSESSMENT:  74F with PMHx of Afib s/p ablation (on Eliquis), T2DM, HTN, systemic sclerosis, and vertigo presenting with progressive dizziness x1 month, worsening over 4-5 days. Initial workup with CTA showed 35% L ICA stenosis. Course complicated by acute delirium with positive UA, requiring antipsychotics and antibiotics, admitted for further management.    PLAN:   Acute metabolic encephalopathy 2/2 UTI vs Opioid withdrawal  - Patient initially AOx3, but overnight became more agitated and delirious requiring antipsychotics  - Admit to medicine Telemetry  - CTH negative  - Mild stenosis of left ICA  - UA weakly positive  - F/u cultures  - Utox negative  - RVP negative  - S/p 1 dose of Ceftriaxone   - Mild leukocytosis, trend WBC  - Afebrile, monitor fever curve  - Patient is on Percocet 7.5 last refilled 1/28/2025 as per [facility]STOP, will restart PRN Percocet  - Patient unable to provide history of opioid usage  - COWS score 8, flushed skin, restless, piloerection    Dizziness in setting of Carotid stenosis, new onset severe AS  - Troponin 11 -> 11  - EKG with NSR  - Symptoms improved with Meclizine  - TTE with normal EF, incidentally severe AS noted  - Orthostatic BP  - TAVR workup once mental status improves    A fib  - NSR on Tele  - Continue Lopressor, Cardizem  - Eliquis for AC    DM  - Hold home Metformin  - Accuchecks  - Lantus  - ISS  - A1c in AM  - Consistent carb diet    HTN  - Olmesartan -> Losartan    Scleroderma  - Continue Hydroxychloroquine    DVT ppx: Eliquis  Diet: DASH/CC  DISPO: Admit to medicine, pending infectious workup           ASSESSMENT:  74F with PMHx of Afib s/p ablation (on Eliquis), T2DM, HTN, systemic sclerosis, and vertigo presenting with progressive dizziness x1 month, worsening over 4-5 days. Initial workup with CTA showed 35% L ICA stenosis. Course complicated by acute delirium with positive UA, requiring antipsychotics and antibiotics, admitted for further management.    PLAN:   Acute metabolic encephalopathy 2/2 UTI vs Opioid withdrawal  - Patient initially AOx3, but overnight became more agitated and delirious requiring antipsychotics  - Admit to medicine Telemetry  - CTH negative  - Mild stenosis of left ICA  - UA weakly positive  - F/u cultures  - Utox negative  - RVP negative  - S/p 1 dose of Ceftriaxone   - Mild leukocytosis, trend WBC  - Afebrile, monitor fever curve  - Patient is on Percocet 7.5 last refilled 1/28/2025 as per [facility]  - Patient unable to provide history of opioid usage  - COWS score 8, flushed skin, restless, piloerection  - Restart PRN Percocet 5    Dizziness in setting of Carotid stenosis, new onset severe AS  - Troponin 11 -> 11  - EKG with NSR  - Symptoms improved with Meclizine  - TTE with normal EF, incidentally severe AS noted  - Orthostatic BP  - TAVR workup once mental status improves    A fib  - NSR on Tele  - Continue Lopressor, Cardizem  - Eliquis for AC    DM  - Hold home Metformin  - Accuchecks  - Lantus  - ISS  - A1c in AM  - Consistent carb diet    HTN  - Olmesartan -> Losartan    HLD  - Crestor    Scleroderma  - Continue Hydroxychloroquine    DVT ppx: Eliquis  Diet: DASH/CC  DISPO: Admit to medicine, pending infectious workup           ASSESSMENT:  74F with PMHx of Afib s/p ablation (on Eliquis), T2DM, HTN, systemic sclerosis, and vertigo presenting with progressive dizziness x1 month, worsening over 4-5 days. Initial workup with CTA showed 35% L ICA stenosis. Course complicated by acute delirium with positive UA, requiring antipsychotics and antibiotics, admitted for further management.    PLAN:   Acute metabolic encephalopathy 2/2 UTI vs Opioid withdrawal  - Patient initially AOx3, but overnight became more agitated and delirious requiring antipsychotics   -s/p Zyprexa 5mg IM x2  - Admit to medicine Telemetry  - CTH negative  - Mild stenosis of left ICA  - UA weakly positive  - F/u cultures  - Utox negative but may be asked if using synthetic opiate  - RVP negative  - Ceftriaxone 1g q24 for UTI  - Mild leukocytosis, trend WBC  - Afebrile, monitor fever curve  - CTH WO negative  - MRI Brain pending  - Patient is on Percocet 7.5 last refilled 1/28/2025 as per [facility]  - Patient unable to provide history of opioid usage  - COWS score 8, flushed skin, restless, piloerection  - Will restart Percocet at 5mg q6 PRN. Check Tylenol Level.   - Defer initiation of Suboxone vs Methadone therapy to outpt prescriber  - Currently on 1:1 but AMS improving    Dizziness in setting of Carotid stenosis, new incidental severe AS  - Troponin 11 -> 11  - EKG with NSR  - Symptoms improved with Meclizine  - TTE with normal EF, incidentally severe AS noted  - Orthostatic BP  - TAVR workup once mental status improves  - Cardiology reccs appreciated, will need to recall when AMS improves    A fib  - NSR on Tele  - Continue Lopressor, Cardizem  - Eliquis for AC    DM  - Hold home Metformin  - Accuchecks  - ISS  - A1c in AM  - Consistent carb diet    HTN  - Olmesartan -> Losartan  - Hold HCTZ with IVF    HLD  - Crestor    Scleroderma  - Continue Hydroxychloroquine    DVT ppx: Eliquis  Diet: DASH/CC  DISPO: Admit to medicine, pending infectious workup and hospital course.

## 2025-02-25 NOTE — H&P ADULT - ATTENDING COMMENTS
Changes made to above assessment/plan otherwise agree with resident documentation above.    ASSESSMENT:  74F with PMHx of Afib s/p ablation (on Eliquis), T2DM, HTN, systemic sclerosis, and vertigo presenting with progressive dizziness x1 month, worsening over 4-5 days. Initial workup with CTA showed 35% L ICA stenosis. Course complicated by acute delirium with positive UA, requiring antipsychotics and antibiotics, admitted for further management.    PLAN:   Acute metabolic encephalopathy 2/2 UTI vs Opioid withdrawal  - Patient initially AOx3, but overnight became more agitated and delirious requiring antipsychotics   -s/p Zyprexa 5mg IM x2  - Admit to medicine Telemetry  - CTH negative  - Mild stenosis of left ICA  - UA weakly positive  - F/u cultures  - Utox negative but may be asked if using synthetic opiate  - RVP negative  - Ceftriaxone 1g q24 for UTI  - Mild leukocytosis, trend WBC  - Afebrile, monitor fever curve  - CTH WO negative  - MRI Brain pending  - Patient is on Percocet 7.5 last refilled 1/28/2025 as per [facility]  - Patient unable to provide history of opioid usage  - COWS score 8, flushed skin, restless, piloerection  - Will restart Percocet at 5mg q6 PRN. Check Tylenol Level.   - Defer initiation of Suboxone vs Methadone therapy to outpt prescriber  - Currently on 1:1 but AMS improving    Dizziness in setting of Carotid stenosis, new incidental severe AS  - Troponin 11 -> 11  - EKG with NSR  - Symptoms improved with Meclizine  - TTE with normal EF, incidentally severe AS noted  - Orthostatic BP  - TAVR workup once mental status improves  - Cardiology reccs appreciated, will need to recall when AMS improves    A fib  - NSR on Tele  - Continue Lobev Cardizem  - Eliquis for AC    DM  - Hold home Metformin  - Accuchecks  - ISS  - A1c in AM  - Consistent carb diet    HTN  - Olmesartan -> Losartan  - Hold HCTZ with IVF    HLD  - Crestor    Scleroderma  - Continue Hydroxychloroquine    DVT ppx: Eliquis  Diet: DASH/CC  DISPO: Admit to medicine, pending infectious workup and hospital course. None sent

## 2025-02-25 NOTE — ED CDU PROVIDER INITIAL DAY NOTE - CLINICAL SUMMARY MEDICAL DECISION MAKING FREE TEXT BOX
74 y.o. female with PMHx of AFib s/p ablation, DM, HLD, HTN, scleroderma, carotid artery stenosis (<50%) & retrograde flow in left vertebral presents with dizziness described as "off balance". stable trops, 11/11, CT angio head/neck without acute changes. cards consulted pending TTE

## 2025-02-25 NOTE — ED ADULT NURSE REASSESSMENT NOTE - NS ED NURSE REASSESS COMMENT FT1
Pt states "im having a panic attack" and feels hots. States she sometimes gets panic attacks when her back pain is bad. Pt medicated per MAR. JAYLEN Diaare aware.

## 2025-02-25 NOTE — ED CDU PROVIDER INITIAL DAY NOTE - ATTENDING APP SHARED VISIT CONTRIBUTION OF CARE
74-year-old female past ministry of [employer] on Eliquis status post ablation, diabetes, hypertension, hyperlipidemia, scleroderma presents with dizziness and feeling off balance associated with head pressure.  Patient states she is able to suppress symptoms while she is laying flat but when she sits or stands and tries to walk she feels off balance.  No room spinning dizziness.  Says she had an abnormality with carotid arteries in the past which her cardiologist is concerned about.  ED record reviewed.  Overnight patient became acutely agitated, erratic behavior.  Medicated with Haldol and Zyprexa.  On exam this morning.  Patient is alert and oriented x 2 (said year is 2024) and is still endorsing pressure in her head.  CTA without acute pathology, sinuses clear, mild stenosis in the left ICA.  Repeat stat head CT no acute changes.  U tox negative, UA positive.  Patient admitted for metabolic encephalopathy

## 2025-02-25 NOTE — ED CDU PROVIDER DISPOSITION NOTE - CLINICAL COURSE
73yo F PMHx of AFib s/p ablation, DM, HLD, HTN, scleroderma, carotid artery stenosis (<50%) & retrograde flow in left vertebral presented to ED with dizziness described as feeling "off balance". Initial ED labs unremarkable, trop stable 11, 11. CT angio head/neck without acute changes. Cardiology consulted, recommended TTE. pt placed in obs for MR brain. While in observation overnight pt became increasingly agitated and restless, was given 10mg zyprexa and 4mg haldol total. UA with +nitrite, given IV rocephin. Pt found to more confused and agitated on AM rounds. A&O x 2 to person and place. Urine tox negative. AM labs with mild leukocytosis 11.8k, mildly elevated anion gap 20. STAT repeat head CT ordered that was negative. Possible delirium 2/2 UTI. Cultures sent and pending. Case d/w hospitalist, pt admitted to hospital.

## 2025-02-26 DIAGNOSIS — I35.0 NONRHEUMATIC AORTIC (VALVE) STENOSIS: ICD-10-CM

## 2025-02-26 DIAGNOSIS — Z98.890 OTHER SPECIFIED POSTPROCEDURAL STATES: Chronic | ICD-10-CM

## 2025-02-26 DIAGNOSIS — N39.0 URINARY TRACT INFECTION, SITE NOT SPECIFIED: ICD-10-CM

## 2025-02-26 DIAGNOSIS — I48.91 UNSPECIFIED ATRIAL FIBRILLATION: ICD-10-CM

## 2025-02-26 DIAGNOSIS — E11.9 TYPE 2 DIABETES MELLITUS WITHOUT COMPLICATIONS: ICD-10-CM

## 2025-02-26 LAB
A1C WITH ESTIMATED AVERAGE GLUCOSE RESULT: 5.9 % — HIGH (ref 4–5.6)
ALBUMIN SERPL ELPH-MCNC: 3.9 G/DL — SIGNIFICANT CHANGE UP (ref 3.3–5.2)
ALP SERPL-CCNC: 56 U/L — SIGNIFICANT CHANGE UP (ref 40–120)
ALT FLD-CCNC: 43 U/L — HIGH
ANION GAP SERPL CALC-SCNC: 16 MMOL/L — SIGNIFICANT CHANGE UP (ref 5–17)
APTT BLD: 152.5 SEC — CRITICAL HIGH (ref 24.5–35.6)
AST SERPL-CCNC: 36 U/L — HIGH
BILIRUB SERPL-MCNC: 0.4 MG/DL — SIGNIFICANT CHANGE UP (ref 0.4–2)
BUN SERPL-MCNC: 25.6 MG/DL — HIGH (ref 8–20)
CALCIUM SERPL-MCNC: 9 MG/DL — SIGNIFICANT CHANGE UP (ref 8.4–10.5)
CHLORIDE SERPL-SCNC: 107 MMOL/L — SIGNIFICANT CHANGE UP (ref 96–108)
CO2 SERPL-SCNC: 18 MMOL/L — LOW (ref 22–29)
CREAT SERPL-MCNC: 0.71 MG/DL — SIGNIFICANT CHANGE UP (ref 0.5–1.3)
EGFR: 89 ML/MIN/1.73M2 — SIGNIFICANT CHANGE UP
ESTIMATED AVERAGE GLUCOSE: 123 MG/DL — HIGH (ref 68–114)
GLUCOSE BLDC GLUCOMTR-MCNC: 102 MG/DL — HIGH (ref 70–99)
GLUCOSE BLDC GLUCOMTR-MCNC: 69 MG/DL — LOW (ref 70–99)
GLUCOSE BLDC GLUCOMTR-MCNC: 91 MG/DL — SIGNIFICANT CHANGE UP (ref 70–99)
GLUCOSE BLDC GLUCOMTR-MCNC: 94 MG/DL — SIGNIFICANT CHANGE UP (ref 70–99)
GLUCOSE BLDC GLUCOMTR-MCNC: 99 MG/DL — SIGNIFICANT CHANGE UP (ref 70–99)
GLUCOSE SERPL-MCNC: 94 MG/DL — SIGNIFICANT CHANGE UP (ref 70–99)
HCT VFR BLD CALC: 42.7 % — SIGNIFICANT CHANGE UP (ref 34.5–45)
HGB BLD-MCNC: 13.4 G/DL — SIGNIFICANT CHANGE UP (ref 11.5–15.5)
INR BLD: 1.38 RATIO — HIGH (ref 0.85–1.16)
MCHC RBC-ENTMCNC: 28.5 PG — SIGNIFICANT CHANGE UP (ref 27–34)
MCHC RBC-ENTMCNC: 31.4 G/DL — LOW (ref 32–36)
MCV RBC AUTO: 90.9 FL — SIGNIFICANT CHANGE UP (ref 80–100)
NRBC # BLD AUTO: 0 K/UL — SIGNIFICANT CHANGE UP (ref 0–0)
NRBC # FLD: 0 K/UL — SIGNIFICANT CHANGE UP (ref 0–0)
NRBC BLD AUTO-RTO: 0 /100 WBCS — SIGNIFICANT CHANGE UP (ref 0–0)
PLATELET # BLD AUTO: 283 K/UL — SIGNIFICANT CHANGE UP (ref 150–400)
PMV BLD: 9.4 FL — SIGNIFICANT CHANGE UP (ref 7–13)
POTASSIUM SERPL-MCNC: 3.5 MMOL/L — SIGNIFICANT CHANGE UP (ref 3.5–5.3)
POTASSIUM SERPL-SCNC: 3.5 MMOL/L — SIGNIFICANT CHANGE UP (ref 3.5–5.3)
PROT SERPL-MCNC: 6.8 G/DL — SIGNIFICANT CHANGE UP (ref 6.6–8.7)
PROTHROM AB SERPL-ACNC: 15.6 SEC — HIGH (ref 9.9–13.4)
RBC # BLD: 4.7 M/UL — SIGNIFICANT CHANGE UP (ref 3.8–5.2)
RBC # FLD: 15.1 % — HIGH (ref 10.3–14.5)
SODIUM SERPL-SCNC: 141 MMOL/L — SIGNIFICANT CHANGE UP (ref 135–145)
WBC # BLD: 8.47 K/UL — SIGNIFICANT CHANGE UP (ref 3.8–10.5)
WBC # FLD AUTO: 8.47 K/UL — SIGNIFICANT CHANGE UP (ref 3.8–10.5)

## 2025-02-26 PROCEDURE — 99233 SBSQ HOSP IP/OBS HIGH 50: CPT

## 2025-02-26 PROCEDURE — 99222 1ST HOSP IP/OBS MODERATE 55: CPT | Mod: FS

## 2025-02-26 RX ORDER — HEPARIN SODIUM 1000 [USP'U]/ML
3000 INJECTION INTRAVENOUS; SUBCUTANEOUS EVERY 6 HOURS
Refills: 0 | Status: DISCONTINUED | OUTPATIENT
Start: 2025-02-26 | End: 2025-02-28

## 2025-02-26 RX ORDER — HEPARIN SODIUM 1000 [USP'U]/ML
INJECTION INTRAVENOUS; SUBCUTANEOUS
Qty: 25000 | Refills: 0 | Status: DISCONTINUED | OUTPATIENT
Start: 2025-02-26 | End: 2025-02-28

## 2025-02-26 RX ORDER — HEPARIN SODIUM 1000 [USP'U]/ML
6000 INJECTION INTRAVENOUS; SUBCUTANEOUS EVERY 6 HOURS
Refills: 0 | Status: DISCONTINUED | OUTPATIENT
Start: 2025-02-26 | End: 2025-02-28

## 2025-02-26 RX ORDER — ACETAMINOPHEN 500 MG/5ML
650 LIQUID (ML) ORAL ONCE
Refills: 0 | Status: COMPLETED | OUTPATIENT
Start: 2025-02-26 | End: 2025-02-26

## 2025-02-26 RX ORDER — HEPARIN SODIUM 1000 [USP'U]/ML
6000 INJECTION INTRAVENOUS; SUBCUTANEOUS ONCE
Refills: 0 | Status: COMPLETED | OUTPATIENT
Start: 2025-02-26 | End: 2025-02-26

## 2025-02-26 RX ADMIN — DILTIAZEM HYDROCHLORIDE 180 MILLIGRAM(S): 240 TABLET, EXTENDED RELEASE ORAL at 05:01

## 2025-02-26 RX ADMIN — HEPARIN SODIUM 0 UNIT(S)/HR: 1000 INJECTION INTRAVENOUS; SUBCUTANEOUS at 21:08

## 2025-02-26 RX ADMIN — HEPARIN SODIUM 1300 UNIT(S)/HR: 1000 INJECTION INTRAVENOUS; SUBCUTANEOUS at 19:46

## 2025-02-26 RX ADMIN — HEPARIN SODIUM 6000 UNIT(S): 1000 INJECTION INTRAVENOUS; SUBCUTANEOUS at 15:36

## 2025-02-26 RX ADMIN — Medication 40 MILLIGRAM(S): at 05:01

## 2025-02-26 RX ADMIN — Medication 650 MILLIGRAM(S): at 06:38

## 2025-02-26 RX ADMIN — HEPARIN SODIUM 1300 UNIT(S)/HR: 1000 INJECTION INTRAVENOUS; SUBCUTANEOUS at 15:34

## 2025-02-26 RX ADMIN — HYDROXYCHLOROQUINE SULFATE 200 MILLIGRAM(S): 200 TABLET, FILM COATED ORAL at 12:40

## 2025-02-26 RX ADMIN — HEPARIN SODIUM 1100 UNIT(S)/HR: 1000 INJECTION INTRAVENOUS; SUBCUTANEOUS at 22:13

## 2025-02-26 RX ADMIN — CEFTRIAXONE 1000 MILLIGRAM(S): 500 INJECTION, POWDER, FOR SOLUTION INTRAMUSCULAR; INTRAVENOUS at 09:56

## 2025-02-26 RX ADMIN — ROSUVASTATIN CALCIUM 20 MILLIGRAM(S): 20 TABLET, FILM COATED ORAL at 21:26

## 2025-02-26 RX ADMIN — APIXABAN 5 MILLIGRAM(S): 2.5 TABLET, FILM COATED ORAL at 05:01

## 2025-02-26 RX ADMIN — Medication 81 MILLIGRAM(S): at 12:40

## 2025-02-26 RX ADMIN — Medication 650 MILLIGRAM(S): at 05:00

## 2025-02-26 RX ADMIN — LOSARTAN POTASSIUM 100 MILLIGRAM(S): 100 TABLET, FILM COATED ORAL at 06:40

## 2025-02-26 RX ADMIN — METOPROLOL SUCCINATE 50 MILLIGRAM(S): 50 TABLET, EXTENDED RELEASE ORAL at 05:02

## 2025-02-26 NOTE — CHART NOTE - NSCHARTNOTEFT_GEN_A_CORE
Pt. is A&O x3, refuses to go for MRI because she had a traumatic experience at [facility] about 1 1/2 ago.   Pt. states no metal in the body, had no adverse reaction to MRI, but refuses to go even with anesthesia.  Medicine attending  aware

## 2025-02-26 NOTE — CONSULT NOTE ADULT - PROBLEM SELECTOR RECOMMENDATION 9
Consult for TAVR eval.   TTE yesterday with severe AS KATHY 0.69 MG 46.  Plan for TAVR CTA tomorrow (ordered).  If TAVR cta unable to be done tomorrow, will have cardiac cath in afternoon.   If TAVR CTA is done tomorrow, cards will plan for Cath Friday.  If patient is candidate for TAVR, plan will likely be for patient to be discharged once workup complete, when medically ready per primary team, and to come back electively for TAVR (Finalized date TBD).  Will order CTMF> pt has no upper teeth (uses a denture), but has many missing lower teeth and per pt has had history of multiple infections in the past.  Per pt she was being worked up for dental implants.  Discussed with Dr. España.

## 2025-02-26 NOTE — CONSULT NOTE ADULT - PROBLEM SELECTOR RECOMMENDATION 9
TAVR work including CTA and [facility] (tomorrow)  CT surgery to see patient  Outpatient TAVR  Will continue aspirin 81 mg daily  Will continue Crestor 20 mg daily  Lipid panel in AM

## 2025-02-26 NOTE — CONSULT NOTE ADULT - SUBJECTIVE AND OBJECTIVE BOX
[facility]                                              INTERVENTIONAL CARDIOLOGY AT [place] [address]                                             Telephone: [phone]. Fax:[phone]                                                      DEPARTMENT OF INTERVENTIONAL CARDIOLOGY                                                                STRUCTURAL HEART CONSULTATION NOTE                                                                                             History obtained by: Patient and medical record  Community Cardiologist: Angel Mason  Reason for Consultation: Evaluation for cardiac catheterization  Available pt records reviewed: Yes [ x ] No [  ]    Chief complaint:    Patient is a 74y old  Female who presents with a chief complaint of AMS 2/2 UTI v Opiate Withdrawal, Incidental Aortic Stenosis (25 Feb 2025 12:45)    HPI:     Heart Failure:     PAST MEDICAL & SURGICAL HISTORY:  Afib  Hypertension  Diabetes  Diffuse cutaneous systemic sclerosis  Raynaud's phenomenon  Sclerodactyly  Telangiectasia  Lung nodules  [place] spotted fever  Carotid artery stenosis  Scleroderma  History of hernia repair  History of colectomy  History of colostomy reversal  History of breast surgery    FAMILY HISTORY:  Family history of coronary artery disease (Mother)  Family history of coronary artery bypass surgery (Mother)  Family history of congestive heart failure (Mother)    Family History of Premature Cardiovascular Disease:  Yes [  ] No [X]    SOCIAL HISTORY:         Marital: Single, lives alone with dog. Has family nearby.       Smoking: Former 1 ppd smoker for 30 years, quit 2008       ETOH: Denies       Drugs: Denies       Caffeine: Denies    Home Medications:  Crestor 20 mg oral tablet: 1 tab(s) orally once a day (at bedtime) (25 Feb 2025 13:07)  dilTIAZem 240 mg/24 hours oral tablet, extended release: 1 tab(s) orally once a day (25 Feb 2025 13:58)  Eliquis 5 mg oral tablet: 1 tab(s) orally 2 times a day (25 Feb 2025 13:07)  hydroCHLOROthiazide 25 mg oral tablet: 1 tab(s) orally once a day (25 Feb 2025 13:07)  hydroxychloroquine 200 mg oral tablet: 1 tab(s) orally (25 Feb 2025 13:07)  metFORMIN 500 mg oral tablet: 1 tab(s) orally 2 times a day (25 Feb 2025 13:20)  metoprolol tartrate 50 mg oral tablet: 1 tab(s) orally 2 times a day (25 Feb 2025 13:20)  olmesartan 40 mg oral tablet: 1 tab(s) orally once a day (25 Feb 2025 13:07)  omeprazole 40 mg oral delayed release capsule: 1 cap(s) orally 2 times a day (25 Feb 2025 13:04)  Percocet 7.5 mg-325 mg oral tablet: 1 tab(s) orally every 6 hours as needed for  severe pain (25 Feb 2025 13:08)    MEDICATIONS  (STANDING):  apixaban 5 milliGRAM(s) Oral two times a day  aspirin enteric coated 81 milliGRAM(s) Oral daily  cefTRIAXone Injectable. 1000 milliGRAM(s) IV Push every 24 hours  dextrose 5%. 1000 milliLiter(s) (100 mL/Hr) IV Continuous <Continuous>  dextrose 5%. 1000 milliLiter(s) (50 mL/Hr) IV Continuous <Continuous>  dextrose 50% Injectable 25 Gram(s) IV Push once  dextrose 50% Injectable 12.5 Gram(s) IV Push once  dextrose 50% Injectable 25 Gram(s) IV Push once  diltiazem    milliGRAM(s) Oral daily  glucagon  Injectable 1 milliGRAM(s) IntraMuscular once  hydroxychloroquine 200 milliGRAM(s) Oral daily  insulin lispro (ADMELOG) corrective regimen sliding scale   SubCutaneous three times a day before meals  insulin lispro (ADMELOG) corrective regimen sliding scale   SubCutaneous at bedtime  lactated ringers. 1000 milliLiter(s) (100 mL/Hr) IV Continuous <Continuous>  losartan 100 milliGRAM(s) Oral daily  metoprolol tartrate 50 milliGRAM(s) Oral two times a day  pantoprazole    Tablet 40 milliGRAM(s) Oral before breakfast  rosuvastatin 20 milliGRAM(s) Oral at bedtime    MEDICATIONS  (PRN):  dextrose Oral Gel 15 Gram(s) Oral once PRN Blood Glucose LESS THAN 70 milliGRAM(s)/deciliter  oxycodone    5 mG/acetaminophen 325 mG 1 Tablet(s) Oral every 6 hours PRN Severe Pain (7 - 10)    ALLERGIES: No Known Allergies    REVIEW OF SYMPTOMS:   CONSTITUTIONAL: No fever, no chills, no weight loss, no weight gain, no fatigue   CARDIOVASCULAR: No chest pain, no LARSON, no orthopnea, no PND, no palpitations, no edema  RESPIRATORY: no Shortness of breath, no cough, no wheezing  : No dysuria, no hematuria   GI: No dark color stool, no nausea, no diarrhea, no constipation, no abdominal pain   NEURO: No headache, no slurred speech   Heme: No bruising/bleeding problems.  ALL OTHER REVIEW OF SYSTEMS ARE NEGATIVE.    VITAL SIGNS:  Vital Signs Last 24 Hrs  T(C): 36.6 (26 Feb 2025 12:30), Max: 36.8 (25 Feb 2025 15:35)  T(F): 97.8 (26 Feb 2025 12:30), Max: 98.3 (25 Feb 2025 15:35)  HR: 76 (26 Feb 2025 12:30) (76 - 104)  BP: 132/64 (26 Feb 2025 12:30) (118/59 - 145/77)  RR: 16 (26 Feb 2025 12:30) (16 - 18)  SpO2: 98% (26 Feb 2025 12:30) (95% - 98%)    Parameters below as of 26 Feb 2025 12:30  Patient On (Oxygen Delivery Method): room air    INTAKE AND OUTPUT:  I&O's Summary  25 Feb 2025 07:01  -  26 Feb 2025 07:00  --------------------------------------------------------  IN: 35 mL / OUT: 200 mL / NET: -165 mL    PHYSICAL EXAM:  Constitutional: Comfortable. No acute distress.   HEENT: Atraumatic and normocephalic, neck is supple. no JVD. No carotid bruit.  CNS: A&Ox3. No focal deficits.   Respiratory: CTAB, unlabored   Cardiovascular: RRR normal s1 s2. Grade 3/6 systolic murmur. No rubs or gallop.  Gastrointestinal: Soft, non-tender. +Bowel sounds.   Extremities: + Peripheral Pulses, No clubbing, cyanosis, or edema  Psychiatric: Calm. no agitation.   Skin: Warm and dry, no ulcers on extremities     LABS:                        13.4   8.47  )-----------( 283      ( 26 Feb 2025 06:35 )             42.7     02-26    141  |  107  |  25.6[H]  ----------------------------<  94  3.5   |  18.0[L]  |  0.71    Ca    9.0      26 Feb 2025 06:35    TPro  6.8  /  Alb  3.9  /  TBili  0.4  /  DBili  x   /  AST  36[H]  /  ALT  43[H]  /  AlkPhos  56  02-26    ECG: NSR, PRWP  Prior ECG: Yes [  ] No [  ]    CARDIAC TESTING   ECHO: 2/25/2025  FINDINGS:  ·	Left Ventricle: The left ventricular cavity is small. Left ventricular systolic function is normal with a calculated ejection fraction of 67 % by the Winston's biplane method of disks. Unable to assess left ventricular diastolic function due to insufficient data.  ·	Right Ventricle: The right ventricular cavity is normal in size and right ventricular systolic function is normal.  ·	Left Atrium: The left atrium is normal in size with an indexed volume of 20.61 ml/m².  ·	Right Atrium: The right atrium is normal in size.  ·	Interatrial Septum: The interatrial septum was not well visualized.  ·	Aortic Valve: The aortic valve anatomy cannot be determined. There is severe aortic stenosis (AoV EOA, Contin: 0.69 cm², AoV Dimensionless Index 0.22, AV Vmax: 4.2 m/s, AV Peak Gradient: 69.9 mmHg, AV Mean Gradient: 46.0 mmHg). The highest velocity was obtained from the apical window. There is mild aortic regurgitation.  ·	Mitral Valve: There is severe calcification of the mitral valve annulus. There is moderate mitral valve stenosis. There is mild mitral regurgitation.  ·	Tricuspid Valve: Structurally normal tricuspid valve with normal leaflet excursion. There is mild tricuspid regurgitation.  ·	Pulmonic Valve: Structurally normal pulmonic valve with normal leaflet excursion. There is trace pulmonic regurgitation.  ·	Pulmonary Artery: The main pulmonary artery is normal in size, origin, and position.  ·	Aorta: The aortic root and ascending aorta appear normal in size.  ·	Pericardium: No pericardial effusion seen.  ·	Systemic Veins: The inferior vena cava is normal in size (normal <2.1cm) with normal inspiratory collapse (normal >50%) consistent with normal right atrial pressure (~3, range 0-5mmHg).    STRESS:     Cardiac Interventions/Catheterizations:     ELECTROPHYSIOLOGY:     Radiology:   CT ANGIO NECK:                                                 [facility]                                              INTERVENTIONAL CARDIOLOGY AT [address], [place] [address]                                             Telephone: [phone]. Fax:[phone]                                                                INTERVENTIONAL CARDIOLOGY                                                        STRUCTURAL HEART CONSULTATION NOTE                                                                                             History obtained by: Patient and medical record  Community Cardiologist: Angel Mason  Reason for Consultation: Evaluation for cardiac catheterization  Available pt records reviewed: Yes [ x ] No [  ]    Chief complaint:    Patient is a 74y old  Female who presents with a chief complaint of AMS 2/2 UTI v Opiate Withdrawal, Incidental Aortic Stenosis (25 Feb 2025 12:45)    HPI: 75 y/o female former smoker with history of AF, s/p AF ablation at [facility], DM, HLD, HTN, multiple rheumatologic issues including diffuse cutaneous systemic sclerosis (Scl70, Raynaud's, sclerodactyly, telangiectasia, reduced oral aperture, Mauskopf facies), [place] Spotted Fever (treated with doxycycline), lung nodules, carotid artery stenosis (<50%) & retrograde flow in left vertebral presents with dizziness. Pt states she has intermittent symptoms of feeling "off balance" that have become constant over the past 4-5 days. Pt states symptoms are worse with movement and only alleviated by laying down flat. Pt states symptoms are more constant compared to prior vertigo episodes. In ED, pt received meclizine with improvement in symptoms. Pt denies chest pain, back pain, headache, diaphoresis, syncope or N/V. She admits to some SOB with exertion, but states that exertion is limited to back pain. She had an echo which showed severe AS (AoV EOA, Contin: 0.69 cm², AoV Dimensionless Index 0.22, AV Vmax: 4.2 m/s, AV Peak Gradient: 69.9 mmHg, AV Mean Gradient: 46.0 mmHg).    Heart Failure: N/A    PAST MEDICAL & SURGICAL HISTORY:  Afib  Hypertension  Diabetes  Diffuse cutaneous systemic sclerosis  Raynaud's phenomenon  Sclerodactyly  Telangiectasia  Lung nodules  [place] spotted fever  Carotid artery stenosis  Scleroderma  History of hernia repair  History of colectomy  History of colostomy reversal  History of breast surgery    FAMILY HISTORY:  Family history of coronary artery disease (Mother)  Family history of coronary artery bypass surgery (Mother)  Family history of congestive heart failure (Mother)    Family History of Premature Cardiovascular Disease:  Yes [  ] No [X]    SOCIAL HISTORY:         Marital: Single, lives alone with dog. Has family nearby.       Smoking: Former 1 ppd smoker for 30 years, quit 2008       ETOH: Denies       Drugs: Denies       Caffeine: Denies    Home Medications:  Crestor 20 mg oral tablet: 1 tab(s) orally once a day (at bedtime) (25 Feb 2025 13:07)  dilTIAZem 240 mg/24 hours oral tablet, extended release: 1 tab(s) orally once a day (25 Feb 2025 13:58)  Eliquis 5 mg oral tablet: 1 tab(s) orally 2 times a day (25 Feb 2025 13:07)  hydroCHLOROthiazide 25 mg oral tablet: 1 tab(s) orally once a day (25 Feb 2025 13:07)  hydroxychloroquine 200 mg oral tablet: 1 tab(s) orally (25 Feb 2025 13:07)  metFORMIN 500 mg oral tablet: 1 tab(s) orally 2 times a day (25 Feb 2025 13:20)  metoprolol tartrate 50 mg oral tablet: 1 tab(s) orally 2 times a day (25 Feb 2025 13:20)  olmesartan 40 mg oral tablet: 1 tab(s) orally once a day (25 Feb 2025 13:07)  omeprazole 40 mg oral delayed release capsule: 1 cap(s) orally 2 times a day (25 Feb 2025 13:04)  Percocet 7.5 mg-325 mg oral tablet: 1 tab(s) orally every 6 hours as needed for  severe pain (25 Feb 2025 13:08)    MEDICATIONS  (STANDING):  apixaban 5 milliGRAM(s) Oral two times a day  aspirin enteric coated 81 milliGRAM(s) Oral daily  cefTRIAXone Injectable. 1000 milliGRAM(s) IV Push every 24 hours  dextrose 5%. 1000 milliLiter(s) (100 mL/Hr) IV Continuous <Continuous>  dextrose 5%. 1000 milliLiter(s) (50 mL/Hr) IV Continuous <Continuous>  dextrose 50% Injectable 25 Gram(s) IV Push once  dextrose 50% Injectable 12.5 Gram(s) IV Push once  dextrose 50% Injectable 25 Gram(s) IV Push once  diltiazem    milliGRAM(s) Oral daily  glucagon  Injectable 1 milliGRAM(s) IntraMuscular once  hydroxychloroquine 200 milliGRAM(s) Oral daily  insulin lispro (ADMELOG) corrective regimen sliding scale   SubCutaneous three times a day before meals  insulin lispro (ADMELOG) corrective regimen sliding scale   SubCutaneous at bedtime  lactated ringers. 1000 milliLiter(s) (100 mL/Hr) IV Continuous <Continuous>  losartan 100 milliGRAM(s) Oral daily  metoprolol tartrate 50 milliGRAM(s) Oral two times a day  pantoprazole    Tablet 40 milliGRAM(s) Oral before breakfast  rosuvastatin 20 milliGRAM(s) Oral at bedtime    MEDICATIONS  (PRN):  dextrose Oral Gel 15 Gram(s) Oral once PRN Blood Glucose LESS THAN 70 milliGRAM(s)/deciliter  oxycodone    5 mG/acetaminophen 325 mG 1 Tablet(s) Oral every 6 hours PRN Severe Pain (7 - 10)    ALLERGIES: No Known Allergies    REVIEW OF SYMPTOMS:   CONSTITUTIONAL: No fever, no chills, no weight loss, no weight gain, no fatigue   CARDIOVASCULAR: No chest pain, no LARSON, no orthopnea, no PND, no palpitations, no edema  RESPIRATORY: no Shortness of breath, no cough, no wheezing  : No dysuria, no hematuria   GI: No dark color stool, no nausea, no diarrhea, no constipation, no abdominal pain   NEURO: No headache, no slurred speech   Heme: No bruising/bleeding problems.  ALL OTHER REVIEW OF SYSTEMS ARE NEGATIVE.    VITAL SIGNS:  Vital Signs Last 24 Hrs  T(C): 36.6 (26 Feb 2025 12:30), Max: 36.8 (25 Feb 2025 15:35)  T(F): 97.8 (26 Feb 2025 12:30), Max: 98.3 (25 Feb 2025 15:35)  HR: 76 (26 Feb 2025 12:30) (76 - 104)  BP: 132/64 (26 Feb 2025 12:30) (118/59 - 145/77)  RR: 16 (26 Feb 2025 12:30) (16 - 18)  SpO2: 98% (26 Feb 2025 12:30) (95% - 98%)    Parameters below as of 26 Feb 2025 12:30  Patient On (Oxygen Delivery Method): room air    INTAKE AND OUTPUT:  I&O's Summary  25 Feb 2025 07:01 - 26 Feb 2025 07:00  --------------------------------------------------------  IN: 35 mL / OUT: 200 mL / NET: -165 mL    PHYSICAL EXAM:  Constitutional: Comfortable. No acute distress.   HEENT: Atraumatic and normocephalic, neck is supple. no JVD. No carotid bruit.  CNS: A&Ox3. No focal deficits.   Respiratory: CTAB, unlabored   Cardiovascular: RRR normal s1 s2. Grade 3/6 systolic murmur. No rubs or gallop.  Gastrointestinal: Soft, non-tender. +Bowel sounds.   Extremities: + Peripheral Pulses, No clubbing, cyanosis, or edema  Psychiatric: Calm. no agitation.   Skin: Warm and dry, no ulcers on extremities     LABS:                        13.4   8.47  )-----------( 283      ( 26 Feb 2025 06:35 )             42.7     02-26    141  |  107  |  25.6[H]  ----------------------------<  94  3.5   |  18.0[L]  |  0.71    Ca    9.0      26 Feb 2025 06:35    TPro  6.8  /  Alb  3.9  /  TBili  0.4  /  DBili  x   /  AST  36[H]  /  ALT  43[H]  /  AlkPhos  56  02-26      ECG: NSR, PRWP  Prior ECG: Yes [  ] No [  ]    CARDIAC TESTING   ECHO: 2/25/2025  FINDINGS:  ·	Left Ventricle: The left ventricular cavity is small. Left ventricular systolic function is normal with a calculated ejection fraction of 67 % by the Winston's biplane method of disks. Unable to assess left ventricular diastolic function due to insufficient data.  ·	Right Ventricle: The right ventricular cavity is normal in size and right ventricular systolic function is normal.  ·	Left Atrium: The left atrium is normal in size with an indexed volume of 20.61 ml/m².  ·	Right Atrium: The right atrium is normal in size.  ·	Interatrial Septum: The interatrial septum was not well visualized.  ·	Aortic Valve: The aortic valve anatomy cannot be determined. There is severe aortic stenosis (AoV EOA, Contin: 0.69 cm², AoV Dimensionless Index 0.22, AV Vmax: 4.2 m/s, AV Peak Gradient: 69.9 mmHg, AV Mean Gradient: 46.0 mmHg). The highest velocity was obtained from the apical window. There is mild aortic regurgitation.  ·	Mitral Valve: There is severe calcification of the mitral valve annulus. There is moderate mitral valve stenosis. There is mild mitral regurgitation.  ·	Tricuspid Valve: Structurally normal tricuspid valve with normal leaflet excursion. There is mild tricuspid regurgitation.  ·	Pulmonic Valve: Structurally normal pulmonic valve with normal leaflet excursion. There is trace pulmonic regurgitation.  ·	Pulmonary Artery: The main pulmonary artery is normal in size, origin, and position.  ·	Aorta: The aortic root and ascending aorta appear normal in size.  ·	Pericardium: No pericardial effusion seen.  ·	Systemic Veins: The inferior vena cava is normal in size (normal <2.1cm) with normal inspiratory collapse (normal >50%) consistent with normal right atrial pressure (~3, range 0-5mmHg).    STRESS: N/A    Cardiac Interventions/Catheterizations: N/A    ELECTROPHYSIOLOGY: N/A    Radiology:   CT ANGIO NECK: 02/24/2025  AORTIC ARCH AND VISUALIZED GREAT VESSELS: Within normal limits.  RIGHT:  ·	COMMON CAROTID ARTERY: No significant stenosis to the carotid bifurcation.  ·	INTERNAL CAROTID ARTERY: Deposition of calcified plaque at the origin of the right internal carotid artery without significant stenosis based on NASCET criteria.  ·	VERTEBRAL ARTERY: Normal in course and caliber to the intracranial circulation.  LEFT:  ·	COMMON CAROTID ARTERY: No significant stenosis to the carotid bifurcation.  ·	INTERNAL CAROTID ARTERY: Moderate deposition of calcified plaque at the origin of the left internal carotid artery with mild stenosis of 35% based on NASCET criteria.  ·	VERTEBRAL ARTERY: Normal in course and caliber to the intracranial circulation.    CT ANGIO BRAIN: 02/24/2025  ·	INTERNAL CAROTID ARTERIES: Bilateral petrous, precavernous, cavernous, and supraclinoid regions are patent. Deposition of calcified plaque in association with the cavernous and supraclinoid portions of the bilateral internal carotid arteries with mild to moderate stenosis bilaterally in these locations.  ·	[place]: No aneurysm identified. Tiny aneurysms can bebeyond the resolution of CTA technique.  ·	ANTERIOR CEREBRAL ARTERIES: No significant stenosis or occlusion.  ·	MIDDLE CEREBRAL ARTERIES: No significant stenosis or occlusion.  ·	POSTERIOR CEREBRAL ARTERIES: A prominent right posterior communicating artery contributes to flow within the right posterior cerebral artery.  ·	DISTAL VERTEBRAL / BASILAR ARTERIES: No significant stenosis or occlusion.  ·	VENOUS STRUCTURES: Visualized superficial and deep venous structures appear patent.    CT BRAIN: 02/24/2025  ·	VENTRICLES AND SULCI: Age appropriate involutional changes.  ·	INTRA-AXIAL: No mass effect, acute hemorrhage, or midline shift.  ·	EXTRA-AXIAL: Small lipomas are incidentally noted adjacent to the falx cerebri anteriorly.. Basal cisterns are normal in appearance. Deposition of calcified plaque at the level of the bilateral carotid siphons.  ·	VISUALIZED SINUSES:  Clear.  ·	TYMPANOMASTOID CAVITIES:  Clear.  ·	VISUALIZED ORBITS: Normal.  ·	CALVARIUM: Intact.                                              [facility]                                              INTERVENTIONAL CARDIOLOGY AT [address], [place] [address]                                             Telephone: [phone]. Fax:[phone]                                                                INTERVENTIONAL CARDIOLOGY                                                        STRUCTURAL HEART CONSULTATION NOTE                                                                                             History obtained by: Patient and medical record  Community Cardiologist: Angel Mason  Reason for Consultation: Evaluation for cardiac catheterization  Available pt records reviewed: Yes [ x ] No [  ]    Chief complaint:    Patient is a 74y old  Female who presents with a chief complaint of AMS 2/2 UTI v Opiate Withdrawal, Incidental Aortic Stenosis (25 Feb 2025 12:45)    HPI: 75 y/o female former smoker with history of AF, s/p AF ablation at [facility], DM, HLD, HTN, multiple rheumatologic issues including diffuse cutaneous systemic sclerosis (Scl70, Raynaud's, sclerodactyly, telangiectasia, reduced oral aperture, Mauskopf facies), [place] Spotted Fever (treated with doxycycline), lung nodules, carotid artery stenosis (<50%) & retrograde flow in left vertebral presents with dizziness. Pt states she has intermittent symptoms of feeling "off balance" that have become constant over the past 4-5 days. Pt states symptoms are worse with movement and only alleviated by laying down flat. Pt states symptoms are more constant compared to prior vertigo episodes. In ED, pt received meclizine with improvement in symptoms. Pt denies chest pain, back pain, headache, diaphoresis, syncope or N/V. She admits to some SOB with exertion, but states that exertion is limited to back pain. She had an echo which showed severe AS (AoV EOA, Contin: 0.69 cm², AoV Dimensionless Index 0.22, AV Vmax: 4.2 m/s, AV Peak Gradient: 69.9 mmHg, AV Mean Gradient: 46.0 mmHg).    Heart Failure: N/A    PAST MEDICAL & SURGICAL HISTORY:  Afib  Hypertension  Diabetes  Diffuse cutaneous systemic sclerosis  Raynaud's phenomenon  Sclerodactyly  Telangiectasia  Lung nodules  [place] spotted fever  Carotid artery stenosis  Scleroderma  History of hernia repair  History of colectomy  History of colostomy reversal  History of breast surgery    FAMILY HISTORY:  Family history of coronary artery disease (Mother)  Family history of coronary artery bypass surgery (Mother)  Family history of congestive heart failure (Mother)    Family History of Premature Cardiovascular Disease:  Yes [  ] No [X]    SOCIAL HISTORY:         Marital: Single, lives alone with dog. Has family nearby.       Smoking: Former 1 ppd smoker for 30 years, quit 2008       ETOH: Denies       Drugs: Denies       Caffeine: Denies    Home Medications:  Crestor 20 mg oral tablet: 1 tab(s) orally once a day (at bedtime) (25 Feb 2025 13:07)  dilTIAZem 240 mg/24 hours oral tablet, extended release: 1 tab(s) orally once a day (25 Feb 2025 13:58)  Eliquis 5 mg oral tablet: 1 tab(s) orally 2 times a day (25 Feb 2025 13:07)  hydroCHLOROthiazide 25 mg oral tablet: 1 tab(s) orally once a day (25 Feb 2025 13:07)  hydroxychloroquine 200 mg oral tablet: 1 tab(s) orally (25 Feb 2025 13:07)  metFORMIN 500 mg oral tablet: 1 tab(s) orally 2 times a day (25 Feb 2025 13:20)  metoprolol tartrate 50 mg oral tablet: 1 tab(s) orally 2 times a day (25 Feb 2025 13:20)  olmesartan 40 mg oral tablet: 1 tab(s) orally once a day (25 Feb 2025 13:07)  omeprazole 40 mg oral delayed release capsule: 1 cap(s) orally 2 times a day (25 Feb 2025 13:04)  Percocet 7.5 mg-325 mg oral tablet: 1 tab(s) orally every 6 hours as needed for  severe pain (25 Feb 2025 13:08)    MEDICATIONS  (STANDING):  apixaban 5 milliGRAM(s) Oral two times a day  aspirin enteric coated 81 milliGRAM(s) Oral daily  cefTRIAXone Injectable. 1000 milliGRAM(s) IV Push every 24 hours  dextrose 5%. 1000 milliLiter(s) (100 mL/Hr) IV Continuous <Continuous>  dextrose 5%. 1000 milliLiter(s) (50 mL/Hr) IV Continuous <Continuous>  dextrose 50% Injectable 25 Gram(s) IV Push once  dextrose 50% Injectable 12.5 Gram(s) IV Push once  dextrose 50% Injectable 25 Gram(s) IV Push once  diltiazem    milliGRAM(s) Oral daily  glucagon  Injectable 1 milliGRAM(s) IntraMuscular once  hydroxychloroquine 200 milliGRAM(s) Oral daily  insulin lispro (ADMELOG) corrective regimen sliding scale   SubCutaneous three times a day before meals  insulin lispro (ADMELOG) corrective regimen sliding scale   SubCutaneous at bedtime  lactated ringers. 1000 milliLiter(s) (100 mL/Hr) IV Continuous <Continuous>  losartan 100 milliGRAM(s) Oral daily  metoprolol tartrate 50 milliGRAM(s) Oral two times a day  pantoprazole    Tablet 40 milliGRAM(s) Oral before breakfast  rosuvastatin 20 milliGRAM(s) Oral at bedtime    MEDICATIONS  (PRN):  dextrose Oral Gel 15 Gram(s) Oral once PRN Blood Glucose LESS THAN 70 milliGRAM(s)/deciliter  oxycodone    5 mG/acetaminophen 325 mG 1 Tablet(s) Oral every 6 hours PRN Severe Pain (7 - 10)    ALLERGIES: No Known Allergies    REVIEW OF SYMPTOMS:   CONSTITUTIONAL: No fever, no chills, no weight loss, no weight gain, no fatigue   CARDIOVASCULAR: No chest pain, no LARSON, no orthopnea, no PND, no palpitations, no edema  RESPIRATORY: no Shortness of breath, no cough, no wheezing  : No dysuria, no hematuria   GI: No dark color stool, no nausea, no diarrhea, no constipation, no abdominal pain   NEURO: No headache, no slurred speech   Heme: No bruising/bleeding problems.  ALL OTHER REVIEW OF SYSTEMS ARE NEGATIVE.    VITAL SIGNS:  Vital Signs Last 24 Hrs  T(C): 36.6 (26 Feb 2025 12:30), Max: 36.8 (25 Feb 2025 15:35)  T(F): 97.8 (26 Feb 2025 12:30), Max: 98.3 (25 Feb 2025 15:35)  HR: 76 (26 Feb 2025 12:30) (76 - 104)  BP: 132/64 (26 Feb 2025 12:30) (118/59 - 145/77)  RR: 16 (26 Feb 2025 12:30) (16 - 18)  SpO2: 98% (26 Feb 2025 12:30) (95% - 98%)    Parameters below as of 26 Feb 2025 12:30  Patient On (Oxygen Delivery Method): room air    INTAKE AND OUTPUT:  I&O's Summary  25 Feb 2025 07:01 - 26 Feb 2025 07:00  --------------------------------------------------------  IN: 35 mL / OUT: 200 mL / NET: -165 mL    PHYSICAL EXAM:  Constitutional: Comfortable. No acute distress.   HEENT: Atraumatic and normocephalic, neck is supple. no JVD. No carotid bruit.  CNS: A&Ox3. No focal deficits.   Respiratory: CTAB, unlabored   Cardiovascular: RRR normal s1 s2. Grade 3/6 systolic murmur. No rubs or gallop.  Gastrointestinal: Soft, non-tender. +Bowel sounds.   Extremities: + Peripheral Pulses, No clubbing, cyanosis, or edema  Psychiatric: Calm. no agitation.   Skin: Warm and dry, no ulcers on extremities     LABS:                        13.4   8.47  )-----------( 283      ( 26 Feb 2025 06:35 )             42.7     02-26    141  |  107  |  25.6[H]  ----------------------------<  94  3.5   |  18.0[L]  |  0.71    Ca    9.0      26 Feb 2025 06:35    TPro  6.8  /  Alb  3.9  /  TBili  0.4  /  DBili  x   /  AST  36[H]  /  ALT  43[H]  /  AlkPhos  56  02-26    Telemetry: SR, one episode of 17 beats PAT at 8:45 AM on February 25.  ECG: NSR, PRWP  Prior ECG: Yes [  ] No [  ]    CARDIAC TESTING   ECHO: 2/25/2025  FINDINGS:  ·	Left Ventricle: The left ventricular cavity is small. Left ventricular systolic function is normal with a calculated ejection fraction of 67 % by the Winston's biplane method of disks. Unable to assess left ventricular diastolic function due to insufficient data.  ·	Right Ventricle: The right ventricular cavity is normal in size and right ventricular systolic function is normal.  ·	Left Atrium: The left atrium is normal in size with an indexed volume of 20.61 ml/m².  ·	Right Atrium: The right atrium is normal in size.  ·	Interatrial Septum: The interatrial septum was not well visualized.  ·	Aortic Valve: The aortic valve anatomy cannot be determined. There is severe aortic stenosis (AoV EOA, Contin: 0.69 cm², AoV Dimensionless Index 0.22, AV Vmax: 4.2 m/s, AV Peak Gradient: 69.9 mmHg, AV Mean Gradient: 46.0 mmHg). The highest velocity was obtained from the apical window. There is mild aortic regurgitation.  ·	Mitral Valve: There is severe calcification of the mitral valve annulus. There is moderate mitral valve stenosis. There is mild mitral regurgitation.  ·	Tricuspid Valve: Structurally normal tricuspid valve with normal leaflet excursion. There is mild tricuspid regurgitation.  ·	Pulmonic Valve: Structurally normal pulmonic valve with normal leaflet excursion. There is trace pulmonic regurgitation.  ·	Pulmonary Artery: The main pulmonary artery is normal in size, origin, and position.  ·	Aorta: The aortic root and ascending aorta appear normal in size.  ·	Pericardium: No pericardial effusion seen.  ·	Systemic Veins: The inferior vena cava is normal in size (normal <2.1cm) with normal inspiratory collapse (normal >50%) consistent with normal right atrial pressure (~3, range 0-5mmHg).    STRESS: N/A    Cardiac Interventions/Catheterizations: N/A    ELECTROPHYSIOLOGY: N/A    Radiology:   CT ANGIO NECK: 02/24/2025  AORTIC ARCH AND VISUALIZED GREAT VESSELS: Within normal limits.  RIGHT:  ·	COMMON CAROTID ARTERY: No significant stenosis to the carotid bifurcation.  ·	INTERNAL CAROTID ARTERY: Deposition of calcified plaque at the origin of the right internal carotid artery without significant stenosis based on NASCET criteria.  ·	VERTEBRAL ARTERY: Normal in course and caliber to the intracranial circulation.  LEFT:  ·	COMMON CAROTID ARTERY: No significant stenosis to the carotid bifurcation.  ·	INTERNAL CAROTID ARTERY: Moderate deposition of calcified plaque at the origin of the left internal carotid artery with mild stenosis of 35% based on NASCET criteria.  ·	VERTEBRAL ARTERY: Normal in course and caliber to the intracranial circulation.    CT ANGIO BRAIN: 02/24/2025  ·	INTERNAL CAROTID ARTERIES: Bilateral petrous, precavernous, cavernous, and supraclinoid regions are patent. Deposition of calcified plaque in association with the cavernous and supraclinoid portions of the bilateral internal carotid arteries with mild to moderate stenosis bilaterally in these locations.  ·	[place] OF [place]: No aneurysm identified. Tiny aneurysms can bebeyond the resolution of CTA technique.  ·	ANTERIOR CEREBRAL ARTERIES: No significant stenosis or occlusion.  ·	MIDDLE CEREBRAL ARTERIES: No significant stenosis or occlusion.  ·	POSTERIOR CEREBRAL ARTERIES: A prominent right posterior communicating artery contributes to flow within the right posterior cerebral artery.  ·	DISTAL VERTEBRAL / BASILAR ARTERIES: No significant stenosis or occlusion.  ·	VENOUS STRUCTURES: Visualized superficial and deep venous structures appear patent.    CT BRAIN: 02/24/2025  ·	VENTRICLES AND SULCI: Age appropriate involutional changes.  ·	INTRA-AXIAL: No mass effect, acute hemorrhage, or midline shift.  ·	EXTRA-AXIAL: Small lipomas are incidentally noted adjacent to the falx cerebri anteriorly.. Basal cisterns are normal in appearance. Deposition of calcified plaque at the level of the bilateral carotid siphons.  ·	VISUALIZED SINUSES:  Clear.  ·	TYMPANOMASTOID CAVITIES:  Clear.  ·	VISUALIZED ORBITS: Normal.  ·	CALVARIUM: Intact.

## 2025-02-26 NOTE — CONSULT NOTE ADULT - ASSESSMENT
75 y/o female former smoker with history of AF, s/p AF ablation at [facility], DM, HLD, HTN, multiple rheumatologic issues including diffuse cutaneous systemic sclerosis (Scl70, Raynaud's, sclerodactyly, telangiectasia, reduced oral aperture, Mauskopf facies), [place] Spotted Fever (treated with doxycycline), lung nodules, carotid artery stenosis (<50%) & retrograde flow in left vertebral presents with dizziness. Pt states she has intermittent symptoms of feeling "off balance" that have become constant over the past 4-5 days. Pt states symptoms are worse with movement and only alleviated by laying down flat. Pt states symptoms are more constant compared to prior vertigo episodes. In ED, pt received meclizine with improvement in symptoms. Pt denies chest pain, back pain, headache, diaphoresis, syncope or N/V. She admits to some SOB with exertion, but states that exertion is limited to back pain. She had an echo which showed severe AS (AoV EOA, Contin: 0.69 cm², AoV Dimensionless Index 0.22, AV Vmax: 4.2 m/s, AV Peak Gradient: 69.9 mmHg, AV Mean Gradient: 46.0 mmHg).

## 2025-02-26 NOTE — CONSULT NOTE ADULT - SUBJECTIVE AND OBJECTIVE BOX
Surgeon: Patria    Consult requesting by: David    HISTORY OF PRESENT ILLNESS:  74y Female Afib s/p ablation (on Eliquis), T2DM, HTN, systemic sclerosis, and vertigo presented BIBEMS with one month of intermittent dizziness that worsened over 4-5 days. Symptoms were described as positional head pressure and off-balance sensation, improved with lying flat, distinct from her previous vertigo episodes. Initial workup revealed stable troponins (11-->11), NSR on ECG, and CTA showing 35% L ICA stenosis. Patient initially improved with meclizine and was placed on cardiac telemetry for observation. However, overnight patient developed acute altered mental status with agitation requiring Zyprexa and Haldol for management. Labs showing leukocytosis of 11.8k, elevated anion gap of 20, and UA positive for nitrites. Repeat head CT was negative. Pt refused MRI.  Patient was started on IV Rocephin for presumed UTI with delirium and admitted to the hospital for further management.  Urine culture + for >100,000 E. Coli. TTE done with EF 67 % and Severe AS with an KATHY 0.69 and MG 46.  CT surgery called for TAVR evaluation.      Pt lying in bed.  Denies CP or SOB.  NAD Noted.            PAST MEDICAL & SURGICAL HISTORY:  Afib      Hypertension      Diabetes      Diffuse cutaneous systemic sclerosis      Raynaud's phenomenon      Sclerodactyly      Telangiectasia      Lung nodules      [place] spotted fever      Carotid artery stenosis      Scleroderma      History of hernia repair      History of colectomy      History of colostomy reversal      History of breast surgery          MEDICATIONS  (STANDING):  aspirin enteric coated 81 milliGRAM(s) Oral daily  cefTRIAXone Injectable. 1000 milliGRAM(s) IV Push every 24 hours  dextrose 5%. 1000 milliLiter(s) (100 mL/Hr) IV Continuous <Continuous>  dextrose 5%. 1000 milliLiter(s) (50 mL/Hr) IV Continuous <Continuous>  dextrose 50% Injectable 25 Gram(s) IV Push once  dextrose 50% Injectable 12.5 Gram(s) IV Push once  dextrose 50% Injectable 25 Gram(s) IV Push once  diltiazem    milliGRAM(s) Oral daily  glucagon  Injectable 1 milliGRAM(s) IntraMuscular once  heparin  Infusion.  Unit(s)/Hr (13 mL/Hr) IV Continuous <Continuous>  hydroxychloroquine 200 milliGRAM(s) Oral daily  insulin lispro (ADMELOG) corrective regimen sliding scale   SubCutaneous three times a day before meals  insulin lispro (ADMELOG) corrective regimen sliding scale   SubCutaneous at bedtime  lactated ringers. 1000 milliLiter(s) (100 mL/Hr) IV Continuous <Continuous>  losartan 100 milliGRAM(s) Oral daily  metoprolol tartrate 50 milliGRAM(s) Oral two times a day  pantoprazole    Tablet 40 milliGRAM(s) Oral before breakfast  rosuvastatin 20 milliGRAM(s) Oral at bedtime    MEDICATIONS  (PRN):  dextrose Oral Gel 15 Gram(s) Oral once PRN Blood Glucose LESS THAN 70 milliGRAM(s)/deciliter  heparin   Injectable 6000 Unit(s) IV Push every 6 hours PRN For aPTT less than 40  heparin   Injectable 3000 Unit(s) IV Push every 6 hours PRN For aPTT between 40 - 57  oxycodone    5 mG/acetaminophen 325 mG 1 Tablet(s) Oral every 6 hours PRN Severe Pain (7 - 10)    Antiplatelet therapy:                           Last dose/amt:    Allergies    No Known Allergies    Intolerances        SOCIAL HISTORY:  Smoker: [x ] Yes  [] No  1ppd 40 years.  Quit 20 years ago       ETOH use: [ ] Yes  [x ] No              FREQUENCY / QUANTITY:  Ilicit Drug use:  [ ] Yes  [x ] No  Occupation: retired banker  Live with: Alone  Assisted device use: Denies Use    FAMILY HISTORY:  Family history of coronary artery disease (Mother)    Family history of coronary artery bypass surgery (Mother)    Family history of congestive heart failure (Mother)        Review of Systems  CONSTITUTIONAL:  Fevers[ ] chills[ ] sweats[ ] fatigue[ ] weight loss[ ] weight gain [ ]                                     NEGATIVE [ x]   NEURO:  parathesias[ ] seizures [ ]  syncope [ ]  confusion [ ]                                                                                NEGATIVE[x ]   EYES: glasses[ ]  blurry vision[ ]  discharge[ ] pain[ ] glaucoma [ ]                                                                          NEGATIVE[ x]   ENMT:  difficulty hearing [ ]  vertigo[ ]  dysphagia[ ] epistaxis[ ] recent dental work [ ]                                    NEGATIVE[x ]   CV:  chest pain[ ] palpitations[ ] LARSON [ ] diaphoresis [ ]                                                                                           NEGATIVE[x ]   RESPIRATORY:  wheezing[ ] SOB[ ] cough [ ] sputum[ ] hemoptysis[ ]                                                                  NEGATIVE[xx ]   GI:  nausea[ ]  vommiting [ ]  diarrhea[ ] constipation [ ] melena [ ]                                                                      NEGATIVE[x ]   : hematuria[ ]  dysuria[ ] urgency[ ] incontinence[ ]                                                                                            NEGATIVE[x ]   MUSKULOSKELETAL:  arthritis[ ]  joint swelling [ ] muscle weakness [ ]                                                                NEGATIVE[x ]   SKIN/BREAST:  rash[ ] itching [ ]  hair loss[ ] masses[ ]                                                                                              NEGATIVE[x ]   PSYCH:  dementia [ ] depresion [ ] anxiety[ ]                                                                                                               NEGATIVE[x ]   HEME/LYMPH:  bruises easily[ ] enlarged lymph nodes[ ] tender lymph nodes[ ]                                               NEGATIVE[x ]   ENDOCRINE:  cold intolerance[ ] heat intolerance[ ] polydipsia[ ]                                                                          NEGATIVE[ x]     PHYSICAL EXAM  Vital Signs Last 24 Hrs  T(C): 36.6 (26 Feb 2025 12:30), Max: 36.7 (25 Feb 2025 20:55)  T(F): 97.8 (26 Feb 2025 12:30), Max: 98 (25 Feb 2025 20:55)  HR: 76 (26 Feb 2025 12:30) (76 - 103)  BP: 132/64 (26 Feb 2025 12:30) (118/59 - 145/77)  BP(mean): --  RR: 16 (26 Feb 2025 12:30) (16 - 18)  SpO2: 98% (26 Feb 2025 12:30) (96% - 98%)    Parameters below as of 26 Feb 2025 12:30  Patient On (Oxygen Delivery Method): room air        CONSTITUTIONAL:                                                                             Neuro: WNL[x ] Normal exam oriented to person/place & time with no focal motor or sensory  deficits. Other                     Eyes: WNL[ x]   Normal exam of conjunctiva & lids, pupils equally reactive. Other     ENT: WNL[x ]    Normal exam of nasal/oral mucosa with absence of cyanosis. Other  Neck: WNL[x ]  Normal exam of jugular veins, trachea & thyroid. Other  Chest: WNL[x ] Normal lung exam with good air movement absence of wheezes, rales, or rhonchi: Other                                                                                CV:  Auscultation: normal [x ] S3[ ] S4[ ] Irregular [ ] Rub[ ] Clicks[ ]    Murmurs none:[ ]systolic [x ]  diastolic [ ] holosystolic [ ]  Carotids: No Bruits[x ] Other                 Abdominal Aorta: normal [x ] nonpalpable[ ]Other                                                                                      GI:           WNL[ x] Normal exam of abdomen, liver & spleen with no noted masses or tenderness. Other                                                                                                        Extremities: WNL[ x] Normal no evidence of cyanosis or deformity Edema: none[ x]trace[ ]1+[ ]2+[ ]3+[ ]4+[ ]  Lower Extremity Pulses: Right[pp ] Left[ pp]  SKIN :WNL[ x] Normal exam to inspection & palpation. Other:                                                          LABS:                        13.4   8.47  )-----------( 283      ( 26 Feb 2025 06:35 )             42.7     02-26    141  |  107  |  25.6[H]  ----------------------------<  94  3.5   |  18.0[L]  |  0.71    Ca    9.0      26 Feb 2025 06:35    TPro  6.8  /  Alb  3.9  /  TBili  0.4  /  DBili  x   /  AST  36[H]  /  ALT  43[H]  /  AlkPhos  56  02-26      Urinalysis Basic - ( 26 Feb 2025 06:35 )    Color: x / Appearance: x / SG: x / pH: x  Gluc: 94 mg/dL / Ketone: x  / Bili: x / Urobili: x   Blood: x / Protein: x / Nitrite: x   Leuk Esterase: x / RBC: x / WBC x   Sq Epi: x / Non Sq Epi: x / Bacteria: x              Cardiac Cath: NA    TTE / CRYSTAL: < from: TTE W or WO Ultrasound Enhancing Agent (02.25.25 @ 09:42) >     CONCLUSIONS:      1. Left ventricular systolic function is normal with an ejection fraction of 67 % by Winston's method of disks.   2. Normal right ventricular cavity size and normal right ventricular systolic function.   3. Left atrium is normal in size.   4. No pericardial effusion seen.   5. Mild aortic regurgitation.   6. Severe aortic stenosis.    < end of copied text >

## 2025-02-26 NOTE — CONSULT NOTE ADULT - PROBLEM SELECTOR RECOMMENDATION 4
On IV rocephin.   Urine culture + for >100,000 E. Coli.   Pt initially was AMS> now today improved and AAOX3.

## 2025-02-26 NOTE — PATIENT PROFILE ADULT - PATIENT'S GENDER IDENTITY
Diabetes Self-Assessment    Health History  Do you like help filling out, or reading health forms?  no  Do you know what type of diabetes you have?  no  Age at diagnosis:  44  Does any of your family have diabetes? yes   Have you had diabetes education before? no    Woman's Health  Are you planning a pregnancy?  no  Have you ever had gestational diabetes or a baby weighing 9 lbs. or more at birth?  no    Nutrition/Meal Planning  See Registered Dietitian progress note.    Activity  See Registered Dietitian progress note.    Monitoring  Do you test your blood sugars at home?  no  Meter brand:  N/A Testing times/day:  N/A  Do you check your blood sugar level before driving?  no  Do you have a sharps container?  no    Medication  Do you miss or forget to take your pills or insulin?  no  Do you carry a current medication list or card in your wallet?  no  Do you carry or wear diabetes identification?  no  Do you carry a form of fast-acting sugar with you?  no    Complications/Problems  Do you have numbness, tingling, sores, or pain in your hands or feet?  yes   Do you check your feet for any signs of problems?  yes   Did you have a dilated eye exam in the past year?  no   Date:  N/A  Have you seen a dentist in the past year?  yes   Have you had a change or loss in hearing?  no  Do you sleep 6-8 hours per night?  yes   Do you have sleep apnea?  yes   Do you have any sexual problems?  no    Socioeconomic  Do you live alone?  yes   Do you feel safe in relationships at home?  yes   Do money concerns keep you from following health care advice?  no  Do you have cultural or Adventism practices or beliefs that influence how you care for your diabetes?  no  Do you work outside the home?  yes   Do you work second or third shift?  no  Over the last 2 weeks, have you been bothered by feeling loss of interest in doing things?    More than half the days  Over the last 2 weeks, have you felt down, depressed or hopeless?  More than half  the days    What is the best way for you to learn?  Discussion and Demonstration        DIABETES PROGRESS REPORT-DIABETES SELF MANAGEMENT EDUCATION, INDIVIDUAL SESSION   Date: 8/18/2017     ASSESSMENT:    The patient was seen for a total of 90 minutes; RD=45 minutes, RN=45 minutes.  The patient was seen for diabetes education in  an individual setting following a diagnosis of type 2 diabetes.     Current Diabetes Medications: Metformin XR 500mg 1tab po qpm, ac for 2 weeks then 2 tabs po qpm, ac. (Will start taking 2 tabs 8/19/2017)    COMMENTS:   Ebony actively participated in discussion.  Denies side effects from Metformin at this time.   has history of alcohol and drug abuse but has been sober for 10 years, congratulated on success.  Reports started partying after her 16 year marriage ended in divorce.  States not interest in that lifestyle anymore.  Currently  at [employer],  once cold will not work there any longer as entrances to rooms or to the outside and will be too cold.  Complaints of increased pain in knee,  wants to have surgery but needs to have better control of her blood sugar before would consider surgery.   realizes needs to eat more often, difficult as not hungry when wakes up in morning, explains will have coffee for breakfast, not eat lunch and then when gets home eat a lot of food.  has to have something sweet at night, if doesn't she \"sleep walks\" and will eat something sweet,  was told this by male friend who spent night.  Told her she ate a whole cupcake and she had no recollection of this.  Interested in testing blood sugar.  One Touch Ultra Verio provided for education, demonstrated correct blood sugar testing technique, blood sugar 235.  Writer will ask Dr. Moreno to send a prescription for a blood sugar meter, test strips and lancets to her pharmacy, [facility] [address] [facility].     The barriers to self-care and learning limitations were assessed  as: Low literacy  The patient's preference for learning:  visual/verbal and observation  Readiness to learn: The patient demonstrates an ability to understand yes    Asking questions  yes     Ranges of home FBS Ranges of other home BS   Not testing   N/A   Patient did not bring logbook/meter.    Hemoglobin A1C (%)   Date Value   08/07/2017 8.9 (H)     CHOLESTEROL (mg/dL)   Date Value   07/25/2017 157      HDL (mg/dL)   Date Value   07/25/2017 30 (L)    No components found for: LDL   TRIGLYCERIDE (mg/dL)   Date Value   07/25/2017 341 (H)      CALCULATED LDL (mg/dL)   Date Value   07/25/2017 59      Creatinine (mg/dL)   Date Value   07/25/2017 0.57        After today’s session, patient will be able to:   Describe what diabetes is and how it affects the body.   Describe what hypo and hyperglycemia is and what to do in case of each.   Verbalize her target glucose range.   Demonstrate accurate use of a glucometer   Explain the proper disposal of used sharps.   Verbalize the understanding of the medications he/she is using to  manage diabetes.  Patient verbalized no further questions at the end of appointment.    EDUCATION PROVIDED ON THE FOLLOWING DIABETES TOPICS:    The patient's identified learning needs include: diabetes disease process/treatment options, medical nutrition therapy, physical activity, medications, monitoring, acute complications, chronic complications, behavior change strategies and psychosocial adjustment  Today's visit included education/training on the following topics and with the noted assessed outcomes.     Diabetes disease process/overview and treatment options  Definition, types of diabetes, diagnostic criteria of diabetes: Needs review  Causes, risk factors, symptoms of diabetes: Needs review   Importance of diabetes control, ongoing education, and possible treatment changes/options: Needs review    Monitoring   Blood glucose (purpose, testing times, care of meter/teststrips, correct  technique, keeping a record, lancet disposal, and alternative site testing - if applicable): Competent  Blood glucose targets: am fasting 80 mg/dL - 130 mg/dL or   2 - hour post pranadial glucose level 180mg/dL   Sharps disposal: Competent   Action for results outside target range: Needs review    A1c define, state goal, test schedule: Needs review    Diabetes Medications  oral medications (dose, schedule, action, side effects) Competent   Medication adjustments/supplements, which may include meals, activity changes, travel, surgery: Needs instruction  Over the counter medications: Needs instruction    Physical Activity  Effects of physical activity on blood glucose levels, general health benefits, hypoglycemia prevention: Needs review   See Registered Dietitian progress note.     Psychosocial Strategies  Effect of stress on blood glucose and healthy coping strategies: Needs instruction  Community resources and support systems: Needs instruction  Depression signs and symptoms, possible treatments and resources: Needs instruction    Nutrition    See Registered Dietitian progress note.      Acute Complications (prevention,detection,treatment)  Hypoglycemia (risk, causes, signs, symptoms, treatment, prevention): Needs instruction   Hypoglycemia unawareness: Needs instruction  Problem solving: Including when to contact physician/diabetes care team: Needs instruction  Safe driving practices; Need for medical identification use: Needs instruction  Hyperglycemia (risk, causes, signs, symptoms, treatment, prevention):Needs instruction  Sick day (effect of illness on blood glucose and guidelines for sick day self - care:Needs instruction     Chronic Complications (prevention,detection,treatment)  Risk reduction strategies for prevention and treatment of nephropathy, retinopathy, neuropathy, frequent infections and cardiovascular disease:Needs instruction  Essential Care Guidelines: Tests (A1c, lipids,albumin/creatinine ,  diabetes focused visits every 3 - 6 months, dilated eye exam, dental visits and proper oral health care, annual comprehensive lower extremity exam, daily foot care and sign and symptoms of problems/infection, skin care and hygiene, and immunizations (flu, pneumonia, hepatitis B), general health benefits, hypoglycemia prevention:Needs instruction      Strategies to Promote Health/Behavioral Change   Problem solving strategies:Needs review  Behavioral change/Self - care goals: Needs review    Written materials provided were:  Individual Session content which includes:  First Steps to Managing Your Diabetes at Home (FYWB)  A1c A Test to Measure Blood Sugar Control (FYWB)  Blood Sugar Targets (FYWB)    PLAN:    Goal Setting to Promote Health & Problem Solving for Daily Living was discussed.  Your patient’s individualized goals include:   Test blood sugar daily at varied times Began 8/18/2017 Will review at follow up session       Diabetes Self-Management Support Plan:  Diabetes educator contact number/business card  Family Support    Completion of Diabetes Self-Management Education and Support Program.  Ebony scheduled for follow up appointment on 9/20/2017.    Please contact us with any questions/concerns:    Theresa Behnke BSN, MS Reshma, TACO  [facility]  Diabetes Education  PCP order located in the patient's EHR.   Female

## 2025-02-26 NOTE — PATIENT PROFILE ADULT - FALL HARM RISK - HARM RISK INTERVENTIONS

## 2025-02-26 NOTE — CONSULT NOTE ADULT - REASON FOR ADMISSION
AMS 2/2 UTI v Opiate Withdrawal, Incidental Aortic Stenosis
AMS 2/2 UTI v Opiate Withdrawal, Incidental Aortic Stenosis

## 2025-02-26 NOTE — CONSULT NOTE ADULT - ASSESSMENT
74y Female Afib s/p ablation (on Eliquis), T2DM, HTN, systemic sclerosis, and vertigo presented BIBEMS with one month of intermittent dizziness that worsened over 4-5 days. Symptoms were described as positional head pressure and off-balance sensation, improved with lying flat, distinct from her previous vertigo episodes. Initial workup revealed stable troponins (11-->11), NSR on ECG, and CTA showing 35% L ICA stenosis. Patient initially improved with meclizine and was placed on cardiac telemetry for observation. However, overnight patient developed acute altered mental status with agitation requiring Zyprexa and Haldol for management. Labs showing leukocytosis of 11.8k, elevated anion gap of 20, and UA positive for nitrites. Repeat head CT was negative. Pt refused MRI.  Patient was started on IV Rocephin for presumed UTI with delirium and admitted to the hospital for further management.  Urine culture + for >100,000 E. Coli. TTE done with EF 67 % and Severe AS with an KATHY 0.69 and MG 46.  CT surgery called for TAVR evaluation.

## 2025-02-27 LAB
ALBUMIN SERPL ELPH-MCNC: 3.7 G/DL — SIGNIFICANT CHANGE UP (ref 3.3–5.2)
ALP SERPL-CCNC: 56 U/L — SIGNIFICANT CHANGE UP (ref 40–120)
ALT FLD-CCNC: 44 U/L — HIGH
ANION GAP SERPL CALC-SCNC: 18 MMOL/L — HIGH (ref 5–17)
APTT BLD: 100.4 SEC — HIGH (ref 24.5–35.6)
APTT BLD: 41.8 SEC — HIGH (ref 24.5–35.6)
APTT BLD: 43.5 SEC — HIGH (ref 24.5–35.6)
APTT BLD: 84.6 SEC — HIGH (ref 24.5–35.6)
AST SERPL-CCNC: 32 U/L — HIGH
BASOPHILS # BLD AUTO: 0.04 K/UL — SIGNIFICANT CHANGE UP (ref 0–0.2)
BASOPHILS NFR BLD AUTO: 0.5 % — SIGNIFICANT CHANGE UP (ref 0–2)
BILIRUB SERPL-MCNC: 0.3 MG/DL — LOW (ref 0.4–2)
BLD GP AB SCN SERPL QL: SIGNIFICANT CHANGE UP
BUN SERPL-MCNC: 33.3 MG/DL — HIGH (ref 8–20)
CALCIUM SERPL-MCNC: 8.8 MG/DL — SIGNIFICANT CHANGE UP (ref 8.4–10.5)
CHLORIDE SERPL-SCNC: 109 MMOL/L — HIGH (ref 96–108)
CHOLEST SERPL-MCNC: 104 MG/DL — SIGNIFICANT CHANGE UP
CO2 SERPL-SCNC: 20 MMOL/L — LOW (ref 22–29)
CREAT SERPL-MCNC: 0.89 MG/DL — SIGNIFICANT CHANGE UP (ref 0.5–1.3)
EGFR: 68 ML/MIN/1.73M2 — SIGNIFICANT CHANGE UP
EOSINOPHIL # BLD AUTO: 0.28 K/UL — SIGNIFICANT CHANGE UP (ref 0–0.5)
EOSINOPHIL NFR BLD AUTO: 3.8 % — SIGNIFICANT CHANGE UP (ref 0–6)
GLUCOSE BLDC GLUCOMTR-MCNC: 100 MG/DL — HIGH (ref 70–99)
GLUCOSE BLDC GLUCOMTR-MCNC: 121 MG/DL — HIGH (ref 70–99)
GLUCOSE BLDC GLUCOMTR-MCNC: 137 MG/DL — HIGH (ref 70–99)
GLUCOSE BLDC GLUCOMTR-MCNC: 140 MG/DL — HIGH (ref 70–99)
GLUCOSE SERPL-MCNC: 101 MG/DL — HIGH (ref 70–99)
HCT VFR BLD CALC: 41 % — SIGNIFICANT CHANGE UP (ref 34.5–45)
HCT VFR BLD CALC: 43 % — SIGNIFICANT CHANGE UP (ref 34.5–45)
HDLC SERPL-MCNC: 35 MG/DL — LOW
HGB BLD-MCNC: 12.4 G/DL — SIGNIFICANT CHANGE UP (ref 11.5–15.5)
HGB BLD-MCNC: 13.1 G/DL — SIGNIFICANT CHANGE UP (ref 11.5–15.5)
IMM GRANULOCYTES # BLD AUTO: 0.02 K/UL — SIGNIFICANT CHANGE UP (ref 0–0.07)
IMM GRANULOCYTES NFR BLD AUTO: 0.3 % — SIGNIFICANT CHANGE UP (ref 0–0.9)
INR BLD: 1.1 RATIO — SIGNIFICANT CHANGE UP (ref 0.85–1.16)
LIPID PNL WITH DIRECT LDL SERPL: 42 MG/DL — SIGNIFICANT CHANGE UP
LYMPHOCYTES # BLD AUTO: 2.37 K/UL — SIGNIFICANT CHANGE UP (ref 1–3.3)
LYMPHOCYTES NFR BLD AUTO: 31.8 % — SIGNIFICANT CHANGE UP (ref 13–44)
MAGNESIUM SERPL-MCNC: 2.1 MG/DL — SIGNIFICANT CHANGE UP (ref 1.6–2.6)
MCHC RBC-ENTMCNC: 27.6 PG — SIGNIFICANT CHANGE UP (ref 27–34)
MCHC RBC-ENTMCNC: 27.7 PG — SIGNIFICANT CHANGE UP (ref 27–34)
MCHC RBC-ENTMCNC: 30.2 G/DL — LOW (ref 32–36)
MCHC RBC-ENTMCNC: 30.5 G/DL — LOW (ref 32–36)
MCV RBC AUTO: 90.7 FL — SIGNIFICANT CHANGE UP (ref 80–100)
MCV RBC AUTO: 91.7 FL — SIGNIFICANT CHANGE UP (ref 80–100)
MONOCYTES # BLD AUTO: 0.51 K/UL — SIGNIFICANT CHANGE UP (ref 0–0.9)
MONOCYTES NFR BLD AUTO: 6.8 % — SIGNIFICANT CHANGE UP (ref 2–14)
NEUTROPHILS # BLD AUTO: 4.23 K/UL — SIGNIFICANT CHANGE UP (ref 1.8–7.4)
NEUTROPHILS NFR BLD AUTO: 56.8 % — SIGNIFICANT CHANGE UP (ref 43–77)
NON HDL CHOLESTEROL: 69 MG/DL — SIGNIFICANT CHANGE UP
NRBC # BLD AUTO: 0 K/UL — SIGNIFICANT CHANGE UP (ref 0–0)
NRBC # BLD AUTO: 0 K/UL — SIGNIFICANT CHANGE UP (ref 0–0)
NRBC # FLD: 0 K/UL — SIGNIFICANT CHANGE UP (ref 0–0)
NRBC # FLD: 0 K/UL — SIGNIFICANT CHANGE UP (ref 0–0)
NRBC BLD AUTO-RTO: 0 /100 WBCS — SIGNIFICANT CHANGE UP (ref 0–0)
NRBC BLD AUTO-RTO: 0 /100 WBCS — SIGNIFICANT CHANGE UP (ref 0–0)
NT-PROBNP SERPL-SCNC: 431 PG/ML — HIGH (ref 0–300)
NT-PROBNP SERPL-SCNC: 446 PG/ML — HIGH (ref 0–300)
PLATELET # BLD AUTO: 274 K/UL — SIGNIFICANT CHANGE UP (ref 150–400)
PLATELET # BLD AUTO: 294 K/UL — SIGNIFICANT CHANGE UP (ref 150–400)
PMV BLD: 9.4 FL — SIGNIFICANT CHANGE UP (ref 7–13)
PMV BLD: 9.6 FL — SIGNIFICANT CHANGE UP (ref 7–13)
POTASSIUM SERPL-MCNC: 3.4 MMOL/L — LOW (ref 3.5–5.3)
POTASSIUM SERPL-SCNC: 3.4 MMOL/L — LOW (ref 3.5–5.3)
PREALB SERPL-MCNC: 22 MG/DL — SIGNIFICANT CHANGE UP (ref 18–38)
PROT SERPL-MCNC: 7 G/DL — SIGNIFICANT CHANGE UP (ref 6.6–8.7)
PROTHROM AB SERPL-ACNC: 12.7 SEC — SIGNIFICANT CHANGE UP (ref 9.9–13.4)
RBC # BLD: 4.47 M/UL — SIGNIFICANT CHANGE UP (ref 3.8–5.2)
RBC # BLD: 4.74 M/UL — SIGNIFICANT CHANGE UP (ref 3.8–5.2)
RBC # FLD: 15.1 % — HIGH (ref 10.3–14.5)
RBC # FLD: 15.3 % — HIGH (ref 10.3–14.5)
SODIUM SERPL-SCNC: 147 MMOL/L — HIGH (ref 135–145)
TRIGL SERPL-MCNC: 160 MG/DL — HIGH
WBC # BLD: 7.45 K/UL — SIGNIFICANT CHANGE UP (ref 3.8–10.5)
WBC # BLD: 8.67 K/UL — SIGNIFICANT CHANGE UP (ref 3.8–10.5)
WBC # FLD AUTO: 7.45 K/UL — SIGNIFICANT CHANGE UP (ref 3.8–10.5)
WBC # FLD AUTO: 8.67 K/UL — SIGNIFICANT CHANGE UP (ref 3.8–10.5)

## 2025-02-27 PROCEDURE — 75574 CT ANGIO HRT W/3D IMAGE: CPT | Mod: 26

## 2025-02-27 PROCEDURE — 93880 EXTRACRANIAL BILAT STUDY: CPT | Mod: 26

## 2025-02-27 PROCEDURE — 71275 CT ANGIOGRAPHY CHEST: CPT | Mod: 26

## 2025-02-27 PROCEDURE — 74174 CTA ABD&PLVS W/CONTRAST: CPT | Mod: 26

## 2025-02-27 PROCEDURE — 70486 CT MAXILLOFACIAL W/O DYE: CPT | Mod: 26

## 2025-02-27 PROCEDURE — 76937 US GUIDE VASCULAR ACCESS: CPT | Mod: 26

## 2025-02-27 PROCEDURE — 36000 PLACE NEEDLE IN VEIN: CPT

## 2025-02-27 PROCEDURE — 99232 SBSQ HOSP IP/OBS MODERATE 35: CPT

## 2025-02-27 RX ORDER — INFLUENZA A VIRUS A/IDAHO/07/2018 (H1N1) ANTIGEN (MDCK CELL DERIVED, PROPIOLACTONE INACTIVATED, INFLUENZA A VIRUS A/INDIANA/08/2018 (H3N2) ANTIGEN (MDCK CELL DERIVED, PROPIOLACTONE INACTIVATED), INFLUENZA B VIRUS B/SINGAPORE/INFTT-16-0610/2016 ANTIGEN (MDCK CELL DERIVED, PROPIOLACTONE INACTIVATED), INFLUENZA B VIRUS B/IOWA/06/2017 ANTIGEN (MDCK CELL DERIVED, PROPIOLACTONE INACTIVATED) 15; 15; 15; 15 UG/.5ML; UG/.5ML; UG/.5ML; UG/.5ML
0.5 INJECTION, SUSPENSION INTRAMUSCULAR ONCE
Refills: 0 | Status: DISCONTINUED | OUTPATIENT
Start: 2025-02-27 | End: 2025-02-28

## 2025-02-27 RX ADMIN — HYDROXYCHLOROQUINE SULFATE 200 MILLIGRAM(S): 200 TABLET, FILM COATED ORAL at 10:38

## 2025-02-27 RX ADMIN — Medication 40 MILLIGRAM(S): at 05:20

## 2025-02-27 RX ADMIN — Medication 40 MILLIEQUIVALENT(S): at 10:38

## 2025-02-27 RX ADMIN — Medication 40 MILLIEQUIVALENT(S): at 13:36

## 2025-02-27 RX ADMIN — HEPARIN SODIUM 1300 UNIT(S)/HR: 1000 INJECTION INTRAVENOUS; SUBCUTANEOUS at 13:37

## 2025-02-27 RX ADMIN — METOPROLOL SUCCINATE 50 MILLIGRAM(S): 50 TABLET, EXTENDED RELEASE ORAL at 18:02

## 2025-02-27 RX ADMIN — METOPROLOL SUCCINATE 50 MILLIGRAM(S): 50 TABLET, EXTENDED RELEASE ORAL at 05:19

## 2025-02-27 RX ADMIN — HEPARIN SODIUM 1300 UNIT(S)/HR: 1000 INJECTION INTRAVENOUS; SUBCUTANEOUS at 07:30

## 2025-02-27 RX ADMIN — HEPARIN SODIUM 1300 UNIT(S)/HR: 1000 INJECTION INTRAVENOUS; SUBCUTANEOUS at 13:15

## 2025-02-27 RX ADMIN — HEPARIN SODIUM 1100 UNIT(S)/HR: 1000 INJECTION INTRAVENOUS; SUBCUTANEOUS at 21:19

## 2025-02-27 RX ADMIN — HEPARIN SODIUM 3000 UNIT(S): 1000 INJECTION INTRAVENOUS; SUBCUTANEOUS at 05:19

## 2025-02-27 RX ADMIN — LOSARTAN POTASSIUM 100 MILLIGRAM(S): 100 TABLET, FILM COATED ORAL at 05:20

## 2025-02-27 RX ADMIN — ROSUVASTATIN CALCIUM 20 MILLIGRAM(S): 20 TABLET, FILM COATED ORAL at 22:01

## 2025-02-27 RX ADMIN — Medication 81 MILLIGRAM(S): at 10:38

## 2025-02-27 RX ADMIN — HEPARIN SODIUM 1300 UNIT(S)/HR: 1000 INJECTION INTRAVENOUS; SUBCUTANEOUS at 19:24

## 2025-02-27 RX ADMIN — Medication 100 MILLILITER(S): at 13:58

## 2025-02-27 RX ADMIN — HEPARIN SODIUM 1300 UNIT(S)/HR: 1000 INJECTION INTRAVENOUS; SUBCUTANEOUS at 05:15

## 2025-02-27 RX ADMIN — CEFTRIAXONE 1000 MILLIGRAM(S): 500 INJECTION, POWDER, FOR SOLUTION INTRAMUSCULAR; INTRAVENOUS at 08:33

## 2025-02-27 RX ADMIN — DILTIAZEM HYDROCHLORIDE 180 MILLIGRAM(S): 240 TABLET, EXTENDED RELEASE ORAL at 05:20

## 2025-02-27 NOTE — PROVIDER CONTACT NOTE (CRITICAL VALUE NOTIFICATION) - BACKGROUND
Radha Stoner is a 68 year old female.   Chief Complaint   Patient presents with    Follow - Up     acute sinusitis, recurrence not specified, unspecified location     HPI:   Patient here with sinus pressure and neck pain after manipulation    Current Outpatient Medications   Medication Sig Dispense Refill    predniSONE 20 MG Oral Tab Take 1 tablet (20 mg total) by mouth daily. 3 tablet 0    azithromycin (ZITHROMAX Z-NORM) 250 MG Oral Tab Take 1 by oral route every day for 5 days. 2 tablets today. 6 tablet 0    predniSONE 20 MG Oral Tab Take 1 tablet (20 mg total) by mouth daily. 3 tablet 0    amoxicillin 875 MG Oral Tab Take 1 tablet (875 mg total) by mouth 2 (two) times daily. 28 tablet 0    amLODIPine 10 MG Oral Tab Take 1 tablet (10 mg total) by mouth daily. 90 tablet 3    Calcium Carbonate-Vitamin D 600-5 MG-MCG Oral Tab Take 1 tablet by mouth daily.      sucralfate (CARAFATE) 1 g Oral Tab Take 1 tablet (1 g total) by mouth 4 (four) times daily before meals and nightly. (Patient taking differently: Take 1 tablet (1 g total) by mouth 4 (four) times daily before meals and nightly. PRN) 120 tablet 0    metoclopramide 10 MG Oral Tab Take 1 tablet (10 mg total) by mouth 3 (three) times daily as needed. 30 tablet 0    meclizine 25 MG Oral Tab Take 1 tablet (25 mg total) by mouth 3 (three) times daily as needed. 30 tablet 1    rOPINIRole 0.25 MG Oral Tab Take 1 tablet (0.25 mg total) by mouth nightly. 30 tablet 5    TRAZODONE 50 MG Oral Tab TAKE 1 TABLET BY MOUTH EVERY DAY AT NIGHT 90 tablet 3    ESTRADIOL 0.1 MG/GM Vaginal Cream INSERT 1/2 GRAM VAGINALLY TWICE A WEEK 42.5 g 10    acetaminophen 500 MG Oral Tab Take 2 tablets (1,000 mg total) by mouth every 8 (eight) hours.      tiZANidine 4 MG Oral Tab as needed.      pantoprazole 40 MG Oral Tab EC Take 1 tablet (40 mg total) by mouth every morning before breakfast.      Acidophilus/Pectin Oral Cap Take 1 capsule by mouth daily.      Ascorbic Acid (VITAMIN C) 1000 MG  Oral Tab Take 1 tablet (1,000 mg total) by mouth daily.      Famotidine (PEPCID OR) Take 20 mg by mouth. Daily PRN       Cholecalciferol 25 MCG (1000 UT) Oral Tab Take by mouth daily.      Diclofenac Sodium 1 % Transdermal Gel Apply 4 g topically 4 (four) times daily as needed. 1 Tube 3    CALCIUM CITRATE OR Take 1,400 mg by mouth daily. BID      Multiple Vitamins Oral Tab Take 1 tablet by mouth daily. Multiple Vitamins tablet        Past Medical History:   Diagnosis Date    Anemia     Anxiety state, unspecified     Arthritis     Back problem     CERVICAL RADICULOPATHY, CERVICAL SPINAL STENOSIS    Bilateral kidney stones 09/23/2008    Broken foot 03/14/2012    RT - casting. Fracture 5th met. Cast removl/xray foot 4-20-12. Follow up fracture right foot 5-22-12.     Calculus of kidney 2008    Cataract     Depression     Esophageal reflux     Essential hypertension     Gastric polyps 12/04/2018    Gastritis     High blood pressure     Hx of motion sickness     hx of vertigo    Idiopathic acute pancreatitis 2002, 2010    Insomnia     Internal hemorrhoids without complication 12/04/2018    Intestinal disorder     Left wrist fracture 2011 and 2013    Muscle weakness     Osteoarthritis     Osteoporosis     Other chronic pancreatitis (HCC) 11/24/2021    Renal disorder     Rotator cuff tear, right     Traumatic arthritis     Vertigo       Social History:  Social History     Socioeconomic History    Marital status:    Tobacco Use    Smoking status: Never    Smokeless tobacco: Never   Vaping Use    Vaping Use: Never used   Substance and Sexual Activity    Alcohol use: No     Comment: quit 4 - 5 yrs ago due to pancreatitis    Drug use: No   Other Topics Concern    Caffeine Concern No     Comment: Coffee 2 cups daily    Exercise No   Social History Narrative    The patient does not use an assistive device..      The patient does live in a home with stairs.        REVIEW OF SYSTEMS:   GENERAL HEALTH: feels well  otherwise  GENERAL : denies fever, chills, sweats, weight loss, weight gain  SKIN: denies any unusual skin lesions or rashes  RESPIRATORY: denies shortness of breath with exertion  NEURO: denies headaches    EXAM:   Ht 5' 8\" (1.727 m)   Wt 142 lb (64.4 kg)   BMI 21.59 kg/m²   System Details   Skin Inspection - Normal.   Constitutional Overall appearance - Normal.   Head/Face Facial features - Normal. Eyebrows - Normal. Skull - Normal.   Eyes Conjunctiva - Right: Normal, Left: Normal. Pupil - Right: Normal, Left: Normal.    Ears Inspection - Right: Normal, Left: Normal.   Canal - Right: Normal, Left: Normal.    TM - Right: Normal, Left: Normal.   Nasal External nose - Normal.   Consent was obtained.  The nasal cavity was anesthetized with 1% neosynephrine and 4% lidocaine.  The bilateral nares were examined from the nasal vestibule to the nasopharynx.  Areas examined include the nasal floor, turbinates, superior, middle, and inferior meatus, no ethmoid recess, the nasal septum, eustation tube and nasopharynx.  All abnormalities are listed in the exam section.  No evidence of sinusitis or polyps either side   Oral/Oropharynx Lips - Normal, Tonsils - Normal, Tongue - Normal    Neck Exam Inspection - Normal. Palpation - Normal. Parotid gland - Normal. Thyroid gland - Normal.   Lymph Detail Submental. Submandibular. Anterior cervical. Posterior cervical. Supraclavicular.  All without enlargement   Psychiatric Orientation - Oriented to time, place, person & situation. Appropriate mood and affect.   Neurological Memory - Normal. Cranial nerves - Cranial nerves II through XII grossly intact.   Nasopharynx By fiberoptic exam     ASSESSMENT AND PLAN:   1. Sinus pressure  Get a fiberoptic exam.  Will place patient on 3 days of prednisone.  Will consider sinus CT if symptoms persist.  Can also do call me if symptoms persist and we will consider a trial of an antibiotic.    2. C2-3 mild-mod diffuse, C3-4 left mod foraminal &  mild-moderate diffuse, C6-7 right mild-moderate & left moderate foraminal, C7-T1 left mild foraminal bulging discs  Possible sinus headache and neck pain secondary to cervical spine disease as above.    3. Cervicalgia  After manipulation.  Consider cervical spine disease.      The patient indicates understanding of these issues and agrees to the plan.      Kayce Morris MD  1/2/2024  8:55 PM   Pt is admitted for UTI

## 2025-02-27 NOTE — PHYSICAL THERAPY INITIAL EVALUATION ADULT - ADDITIONAL COMMENTS
as per pt, lives alone in private house, has 2STE with HR, independent prior without DME, sister lives 2 minutes away and can support if needed, drives

## 2025-02-27 NOTE — CHART NOTE - NSCHARTNOTEFT_GEN_A_CORE
s/p TAVr CTA today, to be reviewed by structural heart team, pending [facility] tomorrow   CT surgery to follow   NPO p MN would check labs in AM

## 2025-02-27 NOTE — PHYSICAL THERAPY INITIAL EVALUATION ADULT - ASSISTIVE DEVICE FOR TRANSFER: GAIT, REHAB EVAL
completed 20ft with RW, 20ft without, then frailty tested without RW - time: 27.6 seconds (time potentially impacted by heparin)

## 2025-02-28 ENCOUNTER — TRANSCRIPTION ENCOUNTER (OUTPATIENT)
Age: 75
End: 2025-02-28

## 2025-02-28 VITALS
OXYGEN SATURATION: 98 % | TEMPERATURE: 98 F | RESPIRATION RATE: 18 BRPM | DIASTOLIC BLOOD PRESSURE: 73 MMHG | HEART RATE: 72 BPM | SYSTOLIC BLOOD PRESSURE: 122 MMHG

## 2025-02-28 LAB
ANION GAP SERPL CALC-SCNC: 15 MMOL/L — SIGNIFICANT CHANGE UP (ref 5–17)
APTT BLD: 88.4 SEC — HIGH (ref 24.5–35.6)
BUN SERPL-MCNC: 17.7 MG/DL — SIGNIFICANT CHANGE UP (ref 8–20)
CALCIUM SERPL-MCNC: 8.3 MG/DL — LOW (ref 8.4–10.5)
CHLORIDE SERPL-SCNC: 112 MMOL/L — HIGH (ref 96–108)
CO2 SERPL-SCNC: 19 MMOL/L — LOW (ref 22–29)
CREAT SERPL-MCNC: 0.69 MG/DL — SIGNIFICANT CHANGE UP (ref 0.5–1.3)
EGFR: 90 ML/MIN/1.73M2 — SIGNIFICANT CHANGE UP
GLUCOSE BLDC GLUCOMTR-MCNC: 103 MG/DL — HIGH (ref 70–99)
GLUCOSE BLDC GLUCOMTR-MCNC: 79 MG/DL — SIGNIFICANT CHANGE UP (ref 70–99)
GLUCOSE SERPL-MCNC: 100 MG/DL — HIGH (ref 70–99)
HCT VFR BLD CALC: 39.3 % — SIGNIFICANT CHANGE UP (ref 34.5–45)
HGB BLD-MCNC: 12.1 G/DL — SIGNIFICANT CHANGE UP (ref 11.5–15.5)
MCHC RBC-ENTMCNC: 28.3 PG — SIGNIFICANT CHANGE UP (ref 27–34)
MCHC RBC-ENTMCNC: 30.8 G/DL — LOW (ref 32–36)
MCV RBC AUTO: 92 FL — SIGNIFICANT CHANGE UP (ref 80–100)
NRBC # BLD AUTO: 0 K/UL — SIGNIFICANT CHANGE UP (ref 0–0)
NRBC # FLD: 0 K/UL — SIGNIFICANT CHANGE UP (ref 0–0)
NRBC BLD AUTO-RTO: 0 /100 WBCS — SIGNIFICANT CHANGE UP (ref 0–0)
PLATELET # BLD AUTO: 273 K/UL — SIGNIFICANT CHANGE UP (ref 150–400)
PMV BLD: 9.2 FL — SIGNIFICANT CHANGE UP (ref 7–13)
POTASSIUM SERPL-MCNC: 4.1 MMOL/L — SIGNIFICANT CHANGE UP (ref 3.5–5.3)
POTASSIUM SERPL-SCNC: 4.1 MMOL/L — SIGNIFICANT CHANGE UP (ref 3.5–5.3)
RBC # BLD: 4.27 M/UL — SIGNIFICANT CHANGE UP (ref 3.8–5.2)
RBC # FLD: 15.4 % — HIGH (ref 10.3–14.5)
SODIUM SERPL-SCNC: 146 MMOL/L — HIGH (ref 135–145)
WBC # BLD: 7.11 K/UL — SIGNIFICANT CHANGE UP (ref 3.8–10.5)
WBC # FLD AUTO: 7.11 K/UL — SIGNIFICANT CHANGE UP (ref 3.8–10.5)

## 2025-02-28 PROCEDURE — 85025 COMPLETE CBC W/AUTO DIFF WBC: CPT

## 2025-02-28 PROCEDURE — 84443 ASSAY THYROID STIM HORMONE: CPT

## 2025-02-28 PROCEDURE — 80061 LIPID PANEL: CPT

## 2025-02-28 PROCEDURE — 0225U NFCT DS DNA&RNA 21 SARSCOV2: CPT

## 2025-02-28 PROCEDURE — 93567 NJX CAR CTH SPRVLV AORTGRPHY: CPT

## 2025-02-28 PROCEDURE — 87086 URINE CULTURE/COLONY COUNT: CPT

## 2025-02-28 PROCEDURE — 99232 SBSQ HOSP IP/OBS MODERATE 35: CPT | Mod: FS,57

## 2025-02-28 PROCEDURE — 71275 CT ANGIOGRAPHY CHEST: CPT | Mod: MC

## 2025-02-28 PROCEDURE — 85027 COMPLETE CBC AUTOMATED: CPT

## 2025-02-28 PROCEDURE — 84134 ASSAY OF PREALBUMIN: CPT

## 2025-02-28 PROCEDURE — 70496 CT ANGIOGRAPHY HEAD: CPT | Mod: MC

## 2025-02-28 PROCEDURE — 83735 ASSAY OF MAGNESIUM: CPT

## 2025-02-28 PROCEDURE — 99239 HOSP IP/OBS DSCHRG MGMT >30: CPT

## 2025-02-28 PROCEDURE — 93458 L HRT ARTERY/VENTRICLE ANGIO: CPT | Mod: 26

## 2025-02-28 PROCEDURE — 84436 ASSAY OF TOTAL THYROXINE: CPT

## 2025-02-28 PROCEDURE — C1887: CPT

## 2025-02-28 PROCEDURE — 36415 COLL VENOUS BLD VENIPUNCTURE: CPT

## 2025-02-28 PROCEDURE — 99285 EMERGENCY DEPT VISIT HI MDM: CPT

## 2025-02-28 PROCEDURE — 87186 SC STD MICRODIL/AGAR DIL: CPT

## 2025-02-28 PROCEDURE — 70486 CT MAXILLOFACIAL W/O DYE: CPT | Mod: MC

## 2025-02-28 PROCEDURE — 86901 BLOOD TYPING SEROLOGIC RH(D): CPT

## 2025-02-28 PROCEDURE — 70450 CT HEAD/BRAIN W/O DYE: CPT | Mod: MC

## 2025-02-28 PROCEDURE — 85610 PROTHROMBIN TIME: CPT

## 2025-02-28 PROCEDURE — 93306 TTE W/DOPPLER COMPLETE: CPT

## 2025-02-28 PROCEDURE — 85730 THROMBOPLASTIN TIME PARTIAL: CPT

## 2025-02-28 PROCEDURE — 84484 ASSAY OF TROPONIN QUANT: CPT

## 2025-02-28 PROCEDURE — 80048 BASIC METABOLIC PNL TOTAL CA: CPT

## 2025-02-28 PROCEDURE — 93880 EXTRACRANIAL BILAT STUDY: CPT

## 2025-02-28 PROCEDURE — 84439 ASSAY OF FREE THYROXINE: CPT

## 2025-02-28 PROCEDURE — 86900 BLOOD TYPING SEROLOGIC ABO: CPT

## 2025-02-28 PROCEDURE — 96372 THER/PROPH/DIAG INJ SC/IM: CPT | Mod: XU

## 2025-02-28 PROCEDURE — 99152 MOD SED SAME PHYS/QHP 5/>YRS: CPT

## 2025-02-28 PROCEDURE — 97163 PT EVAL HIGH COMPLEX 45 MIN: CPT

## 2025-02-28 PROCEDURE — 80307 DRUG TEST PRSMV CHEM ANLYZR: CPT

## 2025-02-28 PROCEDURE — 83036 HEMOGLOBIN GLYCOSYLATED A1C: CPT

## 2025-02-28 PROCEDURE — 86850 RBC ANTIBODY SCREEN: CPT

## 2025-02-28 PROCEDURE — 87040 BLOOD CULTURE FOR BACTERIA: CPT

## 2025-02-28 PROCEDURE — G0378: CPT

## 2025-02-28 PROCEDURE — 83880 ASSAY OF NATRIURETIC PEPTIDE: CPT

## 2025-02-28 PROCEDURE — 93005 ELECTROCARDIOGRAM TRACING: CPT

## 2025-02-28 PROCEDURE — C1769: CPT

## 2025-02-28 PROCEDURE — 75574 CT ANGIO HRT W/3D IMAGE: CPT | Mod: MC

## 2025-02-28 PROCEDURE — C1894: CPT

## 2025-02-28 PROCEDURE — 81001 URINALYSIS AUTO W/SCOPE: CPT

## 2025-02-28 PROCEDURE — 96374 THER/PROPH/DIAG INJ IV PUSH: CPT

## 2025-02-28 PROCEDURE — 93458 L HRT ARTERY/VENTRICLE ANGIO: CPT

## 2025-02-28 PROCEDURE — 94010 BREATHING CAPACITY TEST: CPT

## 2025-02-28 PROCEDURE — 70498 CT ANGIOGRAPHY NECK: CPT | Mod: MC

## 2025-02-28 PROCEDURE — 82962 GLUCOSE BLOOD TEST: CPT

## 2025-02-28 PROCEDURE — 84480 ASSAY TRIIODOTHYRONINE (T3): CPT

## 2025-02-28 PROCEDURE — 71046 X-RAY EXAM CHEST 2 VIEWS: CPT

## 2025-02-28 PROCEDURE — 80053 COMPREHEN METABOLIC PANEL: CPT

## 2025-02-28 PROCEDURE — 74174 CTA ABD&PLVS W/CONTRAST: CPT | Mod: MC

## 2025-02-28 RX ORDER — SULFAMETHOXAZOLE/TRIMETHOPRIM 800-160 MG
1 TABLET ORAL
Qty: 2 | Refills: 0
Start: 2025-02-28

## 2025-02-28 RX ADMIN — Medication 250 MILLILITER(S): at 08:55

## 2025-02-28 RX ADMIN — Medication 81 MILLIGRAM(S): at 05:51

## 2025-02-28 RX ADMIN — DILTIAZEM HYDROCHLORIDE 180 MILLIGRAM(S): 240 TABLET, EXTENDED RELEASE ORAL at 05:50

## 2025-02-28 RX ADMIN — HYDROXYCHLOROQUINE SULFATE 200 MILLIGRAM(S): 200 TABLET, FILM COATED ORAL at 11:33

## 2025-02-28 RX ADMIN — LOSARTAN POTASSIUM 100 MILLIGRAM(S): 100 TABLET, FILM COATED ORAL at 05:52

## 2025-02-28 RX ADMIN — METOPROLOL SUCCINATE 50 MILLIGRAM(S): 50 TABLET, EXTENDED RELEASE ORAL at 05:50

## 2025-02-28 RX ADMIN — CEFTRIAXONE 1000 MILLIGRAM(S): 500 INJECTION, POWDER, FOR SOLUTION INTRAMUSCULAR; INTRAVENOUS at 11:33

## 2025-02-28 RX ADMIN — Medication 40 MILLIGRAM(S): at 05:50

## 2025-02-28 RX ADMIN — Medication 100 MILLILITER(S): at 05:50

## 2025-02-28 NOTE — DISCHARGE NOTE PROVIDER - NSDCCPCAREPLAN_GEN_ALL_CORE_FT
PRINCIPAL DISCHARGE DIAGNOSIS  Diagnosis: Altered mental status  Assessment and Plan of Treatment: Likely due to opiate withdrawal and UTI, now resolved with antibiotics. Please follow up with your primary care doctor outpatient within 2 weeks of discharge. Please insure that you ambulate adequately daily and go to the restroom to prevent further UTIs.      SECONDARY DISCHARGE DIAGNOSES  Diagnosis: Acute UTI  Assessment and Plan of Treatment: plan as above    Diagnosis: Dizziness  Assessment and Plan of Treatment:     Diagnosis: Aortic stenosis  Assessment and Plan of Treatment: Found on echocardiogram. Please take all meds as prescribed and follow up with CT Surgery within 2 weeks

## 2025-02-28 NOTE — PROGRESS NOTE ADULT - ASSESSMENT
74F with PMHx of Afib s/p ablation (on Eliquis), T2DM, HTN, systemic sclerosis, and vertigo presenting with progressive dizziness x1 month, worsening over 4-5 days. Initial workup with CTA showed 35% L ICA stenosis. Course complicated by acute delirium with positive UA, requiring antipsychotics and antibiotics, admitted for further management.    #Acute metabolic encephalopathy 2/2 UTI vs Opioid withdrawal  - Patient initially AOx3, but overnight became more agitated and delirious requiring antipsychotics, now back to baseline  - COWS score 8, flushed skin, restless, piloerection  - s/p Zyprexa 5mg IM x2  - CTH negative  - Mild stenosis of left ICA  - UA weakly positive  - F/u cultures  - RVP negative  - Ceftriaxone 1g q24 for UTI  - Mild leukocytosis, trend WBC  - CTH WO negative, MRI cancelled   - c/w Percocet at 5mg q6 PRN    #Dizziness  - Troponin 11 -> 11  - EKG with NSR  - Symptoms improved with Meclizine  - TTE with normal EF, incidentally severe AS noted  - TAVR workup  - Cardiology reccs appreciated, c/w asa, crestor   - s/p CTA  - [facility] 2/28/2025    #A fib  - NSR on Tele  - Continue Lopressor, Cardizem  - Heparin drip, switch back to eliquis on DC    #DM  - Hold home Metformin  - Accuchecks  - ISS  - Consistent carb diet    #Scleroderma  - Continue Hydroxychloroquine    DVT ppx: Eliquis -> hep  Diet: DASH/CC  DISPO: DC pending Cards recs
74F with PMHx of Afib s/p ablation (on Eliquis), T2DM, HTN, systemic sclerosis, and vertigo presenting with progressive dizziness x1 month, worsening over 4-5 days. Initial workup with CTA showed 35% L ICA stenosis. Course complicated by acute delirium with positive UA, requiring antipsychotics and antibiotics, admitted for further management.    #Acute metabolic encephalopathy 2/2 UTI vs Opioid withdrawal  - Patient initially AOx3, but overnight became more agitated and delirious requiring antipsychotics, now back to baseline  - COWS score 8, flushed skin, restless, piloerection  - s/p Zyprexa 5mg IM x2  - CTH negative  - Mild stenosis of left ICA  - UA weakly positive  - F/u cultures  - RVP negative  - Ceftriaxone 1g q24 for UTI  - Mild leukocytosis, trend WBC  - CTH WO negative, MRI cancelled   - c/w Percocet at 5mg q6 PRN. Check Tylenol Level.     #Dizziness  - Troponin 11 -> 11  - EKG with NSR  - Symptoms improved with Meclizine  - TTE with normal EF, incidentally severe AS noted  - TAVR workup outpatient   - Cardiology reccs appreciated, c/w asa, crestor     #A fib  - NSR on Tele  - Continue Lopressor Cardizem  - cards planning on [facility], changed eliquis to hep     #DM  - Hold home Metformin  - Accuchecks  - ISS  - Consistent carb diet    #Scleroderma  - Continue Hydroxychloroquine    DVT ppx: Eliquis -> hep  Diet: DASH/CC  DISPO: pending cardiac workup     
74F with PMHx of Afib s/p ablation (on Eliquis), T2DM, HTN, systemic sclerosis, and vertigo presenting with progressive dizziness x1 month, worsening over 4-5 days. Initial workup with CTA showed 35% L ICA stenosis. Course complicated by acute delirium with positive UA, requiring antipsychotics and antibiotics, admitted for further management.    #Acute metabolic encephalopathy 2/2 UTI vs Opioid withdrawal  - Patient initially AOx3, but overnight became more agitated and delirious requiring antipsychotics, now back to baseline  - COWS score 8, flushed skin, restless, piloerection  - s/p Zyprexa 5mg IM x2  - CTH negative  - Mild stenosis of left ICA  - UA weakly positive  - F/u cultures  - RVP negative  - Ceftriaxone 1g q24 for UTI  - Mild leukocytosis, trend WBC  - CTH WO negative, MRI cancelled   - c/w Percocet at 5mg q6 PRN    #Dizziness  - Troponin 11 -> 11  - EKG with NSR  - Symptoms improved with Meclizine  - TTE with normal EF, incidentally severe AS noted  - TAVR workup  - Cardiology reccs appreciated, c/w asa, crestor   - CT angio today  - cardiac cath tomorrow     #A fib  - NSR on Tele  - Continue Lopressor, Cardizem  - cards planning on [facility], changed eliquis to hep     #DM  - Hold home Metformin  - Accuchecks  - ISS  - Consistent carb diet    #Scleroderma  - Continue Hydroxychloroquine    DVT ppx: Eliquis -> hep  Diet: DASH/CC  DISPO: [facility] tomorrow, d/c tomorrow v. sat pending cards recs

## 2025-02-28 NOTE — PROVIDER CONTACT NOTE (OTHER) - REASON
PER DR VILLEDA ARRANGE PEER TO PEER FOR CARDIAC CTA   Patient had five pauses for two seconds each as per monitor tech.

## 2025-02-28 NOTE — PROGRESS NOTE ADULT - REASON FOR ADMISSION
AMS 2/2 UTI v Opiate Withdrawal, Incidental Aortic Stenosis

## 2025-02-28 NOTE — DISCHARGE NOTE PROVIDER - ATTENDING DISCHARGE PHYSICAL EXAMINATION:
CONSTITUTIONAL: No apparent distress  HEENT: Normocephalic, Atraumatic  RESPIRATORY:  lungs are clear to auscultation bilaterally, breath sounds equal bilaterally  CARDIOVASCULAR: Regular rate and rhythm, No lower extremity edema  ABDOMEN: Soft, non-distended, nontender to palpation, +BS  PSYCH: thoughts linear, affect appropriate  NEUROLOGY: Alert, Oriented x3

## 2025-02-28 NOTE — DISCHARGE NOTE PROVIDER - PROVIDER TOKENS
PROVIDER:[TOKEN:[[id number]],FOLLOWUP:[2 weeks]],PROVIDER:[TOKEN:[[id number]],FOLLOWUP:[2 weeks]] PROVIDER:[TOKEN:[[id number]],FOLLOWUP:[2 weeks]],PROVIDER:[TOKEN:[[id number]],FOLLOWUP:[2 weeks]],PROVIDER:[TOKEN:[[id number]],FOLLOWUP:[1 week]]

## 2025-02-28 NOTE — DISCHARGE NOTE PROVIDER - CARE PROVIDER_API CALL
Tarik Navarro  Cardiovascular Disease  [address] [place] [address]  Phone: [phone]  Fax: [phone]  Follow Up Time: 2 weeks    Cory España Jr.  Thoracic and Cardiac Surgery  [address] [place] [address]  Phone: [phone]  Fax: [phone]  Follow Up Time: 2 weeks   Tarik Navarro  Cardiovascular Disease  [address] [place] [address]  Phone: [phone]  Fax: [phone]  Follow Up Time: 2 weeks    Cory España Jr.  Thoracic and Cardiac Surgery  [address] [place] [id number]  Phone: [phone]  Fax: [phone]  Follow Up Time: 2 weeks    James Hernandez  Interventional Cardiology  [address] [facility], [address]  [place] [address]  Phone: [phone]  Fax: [phone]  Follow Up Time: 1 week

## 2025-02-28 NOTE — DISCHARGE NOTE PROVIDER - NSDCCPTREATMENT_GEN_ALL_CORE_FT
PRINCIPAL PROCEDURE  Procedure: Left heart cardiac cath  Findings and Treatment: -You had a cardiac catheterization with Dr. patel Guevara today on 2/28/25. Dr. Guevara accessed your right radial artery during the procedure. That is the artery in your right wrist.   -Please continue to monitor your right wrist when you go home. If you develop any bleeding, lay down where you are and apply direct firm pressure until the bleeding stops. If the bleeding is excessive, please call 911.   -If heavy bleeding or large lumps form, hold pressure at the spot and come to the Emergency Room.  -No submerging the arm in water for 48 hours. This includes hot tubs, swimming pools and jacuzzis.  You may start showering tomorrow on 2/29. Prior to showering, remove dressing from right wrist. Shower with warm water and soap. Pat dry with towel. You may place a bandaid over the site. change the bandaid every day.   -Restricted use with no heavy lifting of affected arm for 5 days. No heavy lifting greater than 5 lbs.  -You may walk indoors/ outdoors as tolerated. No strenuous exercise, gym, sports or heavy lifting x5 days.  -Please return to nearest ED if you develop any fever, chills, drainage from the incision site, or any change of temperature, color, sensation of the affected extremity.  -Call your doctor for any bleeding, swelling, loss of sensation in the hand or fingers, or fingers turning blue.  -Please return to nearest ED if you develop any chest pain, chest pressure, shortness of breath, jaw pain, pain radiating down the arm, palpitations, dizziness, palpitations, abdominal complaints including abdominal pain, nausea and vomiting.   -Please follow up with your cardiologist in 1-2 weeks  -You will need to hold your metformin x 48 hours.  Ok to resume Metformin Monday 3/3 AM

## 2025-02-28 NOTE — DISCHARGE NOTE PROVIDER - HOSPITAL COURSE
74F with PMHx of Afib s/p ablation (on Eliquis), T2DM, HTN, systemic sclerosis, and vertigo presenting with progressive dizziness x1 month, worsening over 4-5 days. Initial workup with CTA showed 35% L ICA stenosis. Course complicated by acute delirium with positive UA, requiring antipsychotics and antibiotics. Admitted to medicine, started on pain meds, Echo work up, CT surgery, patient's mental status improved, underwent cath procedure, on DC patient clinically improved, deemed fit for DC home w/ close follow up with cardiology and CT surgery.

## 2025-02-28 NOTE — DISCHARGE NOTE PROVIDER - NSDCFUSCHEDAPPT_GEN_ALL_CORE_FT
Reinier Ortiz  [facility]  [facility] [address]  Scheduled Appointment: 03/18/2025

## 2025-02-28 NOTE — PROGRESS NOTE ADULT - SUBJECTIVE AND OBJECTIVE BOX
[facility]                                              INTERVENTIONAL CARDIOLOGY AT [address], [place] [address]                                             Telephone: [phone]. Fax:[phone]                                                       INTERVENTIONAL CARDIOLOGY PROGRESS NOTE                                                                                             History obtained by: Patient and medical record  Community Cardiologist: Dr. Mason  Reason for Consultation: Evaluation for cardiac catheterization  Available pt records reviewed: Yes [ x ] No [  ]    Chief complaint:    Patient is a 74y old  Female who presents with a chief complaint of AMS 2/2 UTI v Opiate Withdrawal, Incidental Aortic Stenosis (28 Feb 2025 07:05)      HPI:  74F with PMHx of Afib s/p ablation (on Eliquis), T2DM, HTN, systemic sclerosis, and vertigo presented BIBEMS with one month of intermittent dizziness that worsened over 4-5 days. Symptoms were described as positional head pressure and off-balance sensation, improved with lying flat, distinct from her previous vertigo episodes. Initial workup revealed stable troponins (11-->11), NSR on ECG, and CTA showing 35% L ICA stenosis. Patient initially improved with meclizine and was placed on cardiac telemetry for observation. However, overnight patient developed acute altered mental status with agitation requiring Zyprexa and Haldol for management. Morning evaluation revealed patient was A&O x2 with labs showing leukocytosis of 11.8k, elevated anion gap of 20, and UA positive for nitrites. Repeat head CT was negative. Patient was started on IV Rocephin for presumed UTI with delirium and admitted to the hospital for further management. Patient with delirium resolved. She is aox4, awake and appropriately answering questions. TTE: LVEF 67%; normal RV size and normal RVSF; mild AR; severe AS, AV PG 69.9, AV MG 46.0 mmHG, Aov EOA 0.69cm2. Interventional and CT surgery teams were consulted for possible TAVR. Eliquis was discontinued 2/26 and IV heparin was initiated for preparation of LHC. Patient presents to [facility] for LHC for further TAVR work up.       PAST MEDICAL HISTORY  Afib    Hypertension    Diabetes    Diffuse cutaneous systemic sclerosis    Raynaud's phenomenon    Sclerodactyly    Telangiectasia    Lung nodules    [place] spotted fever    Carotid artery stenosis    Scleroderma        PAST SURGICAL HISTORY  No significant past surgical history    History of hernia repair    History of colectomy    History of colostomy reversal    History of breast surgery        FAMILY HISTORY:  Family history of coronary artery disease (Mother)    Family history of coronary artery bypass surgery (Mother)    Family history of congestive heart failure (Mother)      HOME MEDICATIONS:  Crestor 20 mg oral tablet: 1 tab(s) orally once a day (at bedtime) (25 Feb 2025 13:07)  dilTIAZem 240 mg/24 hours oral tablet, extended release: 1 tab(s) orally once a day (25 Feb 2025 13:58)  Eliquis 5 mg oral tablet: 1 tab(s) orally 2 times a day (25 Feb 2025 13:07)  hydroCHLOROthiazide 25 mg oral tablet: 1 tab(s) orally once a day (25 Feb 2025 13:07)  hydroxychloroquine 200 mg oral tablet: 1 tab(s) orally (25 Feb 2025 13:07)  metFORMIN 500 mg oral tablet: 1 tab(s) orally 2 times a day (25 Feb 2025 13:20)  metoprolol tartrate 50 mg oral tablet: 1 tab(s) orally 2 times a day (25 Feb 2025 13:20)  olmesartan 40 mg oral tablet: 1 tab(s) orally once a day (25 Feb 2025 13:07)  omeprazole 40 mg oral delayed release capsule: 1 cap(s) orally 2 times a day (25 Feb 2025 13:04)  Percocet 7.5 mg-325 mg oral tablet: 1 tab(s) orally every 6 hours as needed for  severe pain (25 Feb 2025 13:08)      CURRENT CARDIAC MEDICATIONS:  diltiazem    milliGRAM(s) Oral daily  losartan 100 milliGRAM(s) Oral daily  metoprolol tartrate 50 milliGRAM(s) Oral two times a day      ALLERGIES:   No Known Allergies      REVIEW OF SYMPTOMS:   CONSTITUTIONAL: no fever, no chills, no weight loss, no weight gain, no fatigue   CARDIOVASCULAR: +LARSON, +intermittent dizziness, denies chest pain, chest pressure, denies shortness of breath at rest, denies palpitations, denies passing out  RESPIRATORY: no Shortness of breath, no cough, no wheezing  : No dysuria, no hematuria   GI: No dark color stool, no nausea, no diarrhea, no constipation, no abdominal pain   NEURO: No headache, no slurred speech   ALL OTHER REVIEW OF SYSTEMS ARE NEGATIVE.    VITAL SIGNS:  T(C): 36.9 (02-28-25 @ 06:34), Max: 36.9 (02-28-25 @ 06:34)  T(F): 98.4 (02-28-25 @ 06:34), Max: 98.4 (02-28-25 @ 06:34)  HR: 76 (02-28-25 @ 06:34) (76 - 81)  BP: 140/73 (02-28-25 @ 06:34) (11/57 - 142/60)  RR: 11 (02-28-25 @ 06:34) (11 - 18)  SpO2: 96% (02-28-25 @ 06:34) (94% - 97%)    INTAKE AND OUTPUT:       PHYSICAL EXAM:  Constitutional: Comfortable . No acute distress.   HEENT: Atraumatic and normocephalic , neck is supple . no JVD. No carotid bruit.  CNS: A&Ox3. No focal deficits.   Respiratory: CTAB, unlabored   Cardiovascular: RRR normal s1 s2. No murmur. No rubs or gallop.  Gastrointestinal: Soft, non-tender. +Bowel sounds.   Extremities: 2+ Peripheral Pulses, No clubbing, cyanosis, or edema  Psychiatric: Calm . no agitation.   Skin: Warm and dry, no ulcers on extremities     LABS:                            12.1   7.11  )-----------( 273      ( 28 Feb 2025 06:10 )             39.3     02-28    146[H]  |  112[H]  |  17.7  ----------------------------<  100[H]  4.1   |  19.0[L]  |  0.69    Ca    8.3[L]      28 Feb 2025 06:10  Mg     2.1     02-27    TPro  7.0  /  Alb  3.7  /  TBili  0.3[L]  /  DBili  x   /  AST  32[H]  /  ALT  44[H]  /  AlkPhos  56  02-27    PT/INR - ( 27 Feb 2025 04:20 )   PT: 12.7 sec;   INR: 1.10 ratio         PTT - ( 28 Feb 2025 05:44 )  PTT:88.4 sec  Urinalysis Basic - ( 28 Feb 2025 06:10 )    Color: x / Appearance: x / SG: x / pH: x  Gluc: 100 mg/dL / Ketone: x  / Bili: x / Urobili: x   Blood: x / Protein: x / Nitrite: x   Leuk Esterase: x / RBC: x / WBC x   Sq Epi: x / Non Sq Epi: x / Bacteria: x        ECG: NSR ECG on 2/24  Prior ECG: Yes [  ] No [  ]    CARDIAC TESTING   ECHO: < from: TTE W or WO Ultrasound Enhancing Agent (02.25.25 @ 09:42) >  CONCLUSIONS:      1. Left ventricular systolic function is normal with an ejection fraction of 67 % by Winston's method of disks.   2. Normal right ventricular cavity size and normal right ventricular systolic function.   3. Left atrium is normal in size.   4. No pericardial effusion seen.   5. Mild aortic regurgitation.   6. Severe aortic stenosis.    < end of copied text >      Cardiac Cath Risk Assessments:  ASA: 3  Mallampati: 2  Bleeding Risk: 2.1%  Creatinine: 0.69  GFR: 90      Plan  -Patient scheduled for [facility] today  -Patient seen and examined. Patient is aox4, appropriately answering questions, following commands, no focal neuro deficit  -procedure and risks explained to patient  -ECG and labs reviewed  -aspirin 81mg PO Pre procedure ordered  -NS @ 100cc/hr ordered by hospitalist  -Consent obtained by attending Interventional Cardiologist.    Risks, benefits, and alternatives reviewed.  Risks including but not limited to MI, death, stroke, bleeding, infection, vessel injury, hematoma, renal failure, allergic reaction, urgent open heart surgery, restenosis and stent thrombosis were reviewed.  All questions answered.  Patient is agreeable to proceed.   Pt. assessed, appropriate for sedation, pt. educated regarding the plan for Versed/Fentanyl as needed.    
Hospitalist Progress Note    Chief Complaint:  AMS 2/2 UTI v Opiate Withdrawal, Incidental Aortic Stenosis    SUBJECTIVE / OVERNIGHT EVENTS:  No events overnight, patient seen at bedside, in NAD, no new complaints today. Patient denies chest pain, SOB, abd pain, N/V, fever, chills, dysuria or any other complaints. All remainder ROS negative.     MEDICATIONS  (STANDING):  aspirin enteric coated 81 milliGRAM(s) Oral daily  cefTRIAXone Injectable. 1000 milliGRAM(s) IV Push every 24 hours  dextrose 5%. 1000 milliLiter(s) (100 mL/Hr) IV Continuous <Continuous>  dextrose 5%. 1000 milliLiter(s) (50 mL/Hr) IV Continuous <Continuous>  dextrose 50% Injectable 25 Gram(s) IV Push once  dextrose 50% Injectable 12.5 Gram(s) IV Push once  dextrose 50% Injectable 25 Gram(s) IV Push once  diltiazem    milliGRAM(s) Oral daily  glucagon  Injectable 1 milliGRAM(s) IntraMuscular once  heparin  Infusion.  Unit(s)/Hr (13 mL/Hr) IV Continuous <Continuous>  hydroxychloroquine 200 milliGRAM(s) Oral daily  influenza  Vaccine (HIGH DOSE) 0.5 milliLiter(s) IntraMuscular once  insulin lispro (ADMELOG) corrective regimen sliding scale   SubCutaneous three times a day before meals  insulin lispro (ADMELOG) corrective regimen sliding scale   SubCutaneous at bedtime  losartan 100 milliGRAM(s) Oral daily  metoprolol tartrate 50 milliGRAM(s) Oral two times a day  pantoprazole    Tablet 40 milliGRAM(s) Oral before breakfast  rosuvastatin 20 milliGRAM(s) Oral at bedtime  sodium chloride 0.9%. 1000 milliLiter(s) (100 mL/Hr) IV Continuous <Continuous>    MEDICATIONS  (PRN):  dextrose Oral Gel 15 Gram(s) Oral once PRN Blood Glucose LESS THAN 70 milliGRAM(s)/deciliter  heparin   Injectable 6000 Unit(s) IV Push every 6 hours PRN For aPTT less than 40  heparin   Injectable 3000 Unit(s) IV Push every 6 hours PRN For aPTT between 40 - 57  oxycodone    5 mG/acetaminophen 325 mG 1 Tablet(s) Oral every 6 hours PRN Severe Pain (7 - 10)        I&O's Summary      PHYSICAL EXAM:  Vital Signs Last 24 Hrs  T(C): 36.9 (28 Feb 2025 06:34), Max: 36.9 (28 Feb 2025 06:34)  T(F): 98.4 (28 Feb 2025 06:34), Max: 98.4 (28 Feb 2025 06:34)  HR: 76 (28 Feb 2025 06:34) (76 - 81)  BP: 140/73 (28 Feb 2025 06:34) (11/57 - 142/60)  BP(mean): --  RR: 11 (28 Feb 2025 06:34) (11 - 18)  SpO2: 96% (28 Feb 2025 06:34) (94% - 97%)    Parameters below as of 28 Feb 2025 06:34  Patient On (Oxygen Delivery Method): room air          CONSTITUTIONAL: No apparent distress  HEENT: Normocephalic, Atraumatic  RESPIRATORY:  lungs are clear to auscultation bilaterally, breath sounds equal bilaterally  CARDIOVASCULAR: Regular rate and rhythm, No lower extremity edema  ABDOMEN: Soft, non-distended, nontender to palpation, +BS  PSYCH: thoughts linear, affect appropriate  NEUROLOGY: Alert, Oriented x3    LABS:                        12.1   7.11  )-----------( 273      ( 28 Feb 2025 06:10 )             39.3     02-28    146[H]  |  112[H]  |  17.7  ----------------------------<  100[H]  4.1   |  19.0[L]  |  0.69    Ca    8.3[L]      28 Feb 2025 06:10  Mg     2.1     02-27    TPro  7.0  /  Alb  3.7  /  TBili  0.3[L]  /  DBili  x   /  AST  32[H]  /  ALT  44[H]  /  AlkPhos  56  02-27    PT/INR - ( 27 Feb 2025 04:20 )   PT: 12.7 sec;   INR: 1.10 ratio         PTT - ( 28 Feb 2025 05:44 )  PTT:88.4 sec      Urinalysis Basic - ( 28 Feb 2025 06:10 )    Color: x / Appearance: x / SG: x / pH: x  Gluc: 100 mg/dL / Ketone: x  / Bili: x / Urobili: x   Blood: x / Protein: x / Nitrite: x   Leuk Esterase: x / RBC: x / WBC x   Sq Epi: x / Non Sq Epi: x / Bacteria: x        Culture - Blood (collected 25 Feb 2025 09:32)  Source: .Blood Blood  Preliminary Report (27 Feb 2025 13:01):    No growth at 48 Hours      CAPILLARY BLOOD GLUCOSE      POCT Blood Glucose.: 103 mg/dL (28 Feb 2025 05:47)  POCT Blood Glucose.: 137 mg/dL (27 Feb 2025 21:58)  POCT Blood Glucose.: 140 mg/dL (27 Feb 2025 18:14)  POCT Blood Glucose.: 100 mg/dL (27 Feb 2025 13:34)  POCT Blood Glucose.: 121 mg/dL (27 Feb 2025 08:40)        RADIOLOGY & ADDITIONAL TESTS:  Results Reviewed: Y  Imaging Personally Reviewed: N  Electrocardiogram Personally Reviewed: LINDA                                          
[facility] Division of Hospital Medicine    INTERVAL HISTORY:  Overnight, no acute events.     Patient seen and examined at bedside this morning. A&Ox4, back to baseline. Reports being in pain, requesting oxycodone. Pending CT angio today. Patient denies chest pain, SOB, abd pain, N/V, fever, chills, dysuria or any other complaints.     MEDICATIONS  (STANDING):  aspirin enteric coated 81 milliGRAM(s) Oral daily  cefTRIAXone Injectable. 1000 milliGRAM(s) IV Push every 24 hours  dextrose 5%. 1000 milliLiter(s) (100 mL/Hr) IV Continuous <Continuous>  dextrose 5%. 1000 milliLiter(s) (50 mL/Hr) IV Continuous <Continuous>  dextrose 50% Injectable 25 Gram(s) IV Push once  dextrose 50% Injectable 12.5 Gram(s) IV Push once  dextrose 50% Injectable 25 Gram(s) IV Push once  diltiazem    milliGRAM(s) Oral daily  glucagon  Injectable 1 milliGRAM(s) IntraMuscular once  heparin  Infusion.  Unit(s)/Hr (13 mL/Hr) IV Continuous <Continuous>  hydroxychloroquine 200 milliGRAM(s) Oral daily  influenza  Vaccine (HIGH DOSE) 0.5 milliLiter(s) IntraMuscular once  insulin lispro (ADMELOG) corrective regimen sliding scale   SubCutaneous three times a day before meals  insulin lispro (ADMELOG) corrective regimen sliding scale   SubCutaneous at bedtime  losartan 100 milliGRAM(s) Oral daily  metoprolol tartrate 50 milliGRAM(s) Oral two times a day  pantoprazole    Tablet 40 milliGRAM(s) Oral before breakfast  rosuvastatin 20 milliGRAM(s) Oral at bedtime  sodium chloride 0.9%. 1000 milliLiter(s) (100 mL/Hr) IV Continuous <Continuous>    MEDICATIONS  (PRN):  dextrose Oral Gel 15 Gram(s) Oral once PRN Blood Glucose LESS THAN 70 milliGRAM(s)/deciliter  heparin   Injectable 6000 Unit(s) IV Push every 6 hours PRN For aPTT less than 40  heparin   Injectable 3000 Unit(s) IV Push every 6 hours PRN For aPTT between 40 - 57  oxycodone    5 mG/acetaminophen 325 mG 1 Tablet(s) Oral every 6 hours PRN Severe Pain (7 - 10)        I&O's Summary      PHYSICAL EXAM:  Vital Signs Last 24 Hrs  T(C): 36.4 (27 Feb 2025 04:40), Max: 37.3 (26 Feb 2025 17:56)  T(F): 97.6 (27 Feb 2025 04:40), Max: 99.1 (26 Feb 2025 17:56)  HR: 83 (27 Feb 2025 04:40) (81 - 83)  BP: 130/77 (27 Feb 2025 04:40) (106/51 - 130/77)  BP(mean): --  RR: 18 (27 Feb 2025 04:40) (18 - 18)  SpO2: 92% (27 Feb 2025 04:40) (92% - 98%)    Parameters below as of 27 Feb 2025 04:40  Patient On (Oxygen Delivery Method): room air      CONSTITUTIONAL: No apparent distress  HEENT: Normocephalic, Atraumatic  RESPIRATORY:  lungs are clear to auscultation bilaterally, breath sounds equal bilaterally  CARDIOVASCULAR: Regular rate and rhythm, No lower extremity edema  ABDOMEN: Soft, non-distended, nontender to palpation, +BS  PSYCH: thoughts linear, affect appropriate  NEUROLOGY: Alert, Oriented x3  LABS:                        13.1   7.45  )-----------( 294      ( 27 Feb 2025 04:20 )             43.0     02-27    147[H]  |  109[H]  |  33.3[H]  ----------------------------<  101[H]  3.4[L]   |  20.0[L]  |  0.89    Ca    8.8      27 Feb 2025 04:20  Mg     2.1     02-27    TPro  7.0  /  Alb  3.7  /  TBili  0.3[L]  /  DBili  x   /  AST  32[H]  /  ALT  44[H]  /  AlkPhos  56  02-27    PT/INR - ( 27 Feb 2025 04:20 )   PT: 12.7 sec;   INR: 1.10 ratio         PTT - ( 27 Feb 2025 12:03 )  PTT:84.6 sec      Urinalysis Basic - ( 27 Feb 2025 04:20 )    Color: x / Appearance: x / SG: x / pH: x  Gluc: 101 mg/dL / Ketone: x  / Bili: x / Urobili: x   Blood: x / Protein: x / Nitrite: x   Leuk Esterase: x / RBC: x / WBC x   Sq Epi: x / Non Sq Epi: x / Bacteria: x        Culture - Blood (collected 25 Feb 2025 09:32)  Source: .Blood Blood  Preliminary Report (27 Feb 2025 13:01):    No growth at 48 Hours    Culture - Urine (collected 25 Feb 2025 07:05)  Source: Clean Catch Clean Catch (Midstream)  Preliminary Report (26 Feb 2025 09:57):    >100,000 CFU/ml Escherichia coli      CAPILLARY BLOOD GLUCOSE      POCT Blood Glucose.: 100 mg/dL (27 Feb 2025 13:34)  POCT Blood Glucose.: 121 mg/dL (27 Feb 2025 08:40)  POCT Blood Glucose.: 99 mg/dL (26 Feb 2025 21:25)  POCT Blood Glucose.: 91 mg/dL (26 Feb 2025 17:03)        RADIOLOGY & ADDITIONAL TESTS:  Results Reviewed                          
[facility] Division of Hospital Medicine    INTERVAL HISTORY:  Overnight, no acute events.     Patient seen and examined at bedside this morning. A&Ox4, back to baseline. Reports she does not remember being agitated yesterday, very apologetic. Patient denies chest pain, SOB, abd pain, N/V, fever, chills, dysuria or any other complaints.     MEDICATIONS  (STANDING):  aspirin enteric coated 81 milliGRAM(s) Oral daily  cefTRIAXone Injectable. 1000 milliGRAM(s) IV Push every 24 hours  dextrose 5%. 1000 milliLiter(s) (100 mL/Hr) IV Continuous <Continuous>  dextrose 5%. 1000 milliLiter(s) (50 mL/Hr) IV Continuous <Continuous>  dextrose 50% Injectable 25 Gram(s) IV Push once  dextrose 50% Injectable 12.5 Gram(s) IV Push once  dextrose 50% Injectable 25 Gram(s) IV Push once  diltiazem    milliGRAM(s) Oral daily  glucagon  Injectable 1 milliGRAM(s) IntraMuscular once  heparin  Infusion.  Unit(s)/Hr (13 mL/Hr) IV Continuous <Continuous>  hydroxychloroquine 200 milliGRAM(s) Oral daily  insulin lispro (ADMELOG) corrective regimen sliding scale   SubCutaneous three times a day before meals  insulin lispro (ADMELOG) corrective regimen sliding scale   SubCutaneous at bedtime  lactated ringers. 1000 milliLiter(s) (100 mL/Hr) IV Continuous <Continuous>  losartan 100 milliGRAM(s) Oral daily  metoprolol tartrate 50 milliGRAM(s) Oral two times a day  pantoprazole    Tablet 40 milliGRAM(s) Oral before breakfast  rosuvastatin 20 milliGRAM(s) Oral at bedtime    MEDICATIONS  (PRN):  dextrose Oral Gel 15 Gram(s) Oral once PRN Blood Glucose LESS THAN 70 milliGRAM(s)/deciliter  heparin   Injectable 6000 Unit(s) IV Push every 6 hours PRN For aPTT less than 40  heparin   Injectable 3000 Unit(s) IV Push every 6 hours PRN For aPTT between 40 - 57  oxycodone    5 mG/acetaminophen 325 mG 1 Tablet(s) Oral every 6 hours PRN Severe Pain (7 - 10)        I&O's Summary    25 Feb 2025 07:01  -  26 Feb 2025 07:00  --------------------------------------------------------  IN: 35 mL / OUT: 200 mL / NET: -165 mL        PHYSICAL EXAM:  Vital Signs Last 24 Hrs  T(C): 36.6 (26 Feb 2025 12:30), Max: 36.7 (25 Feb 2025 20:55)  T(F): 97.8 (26 Feb 2025 12:30), Max: 98 (25 Feb 2025 20:55)  HR: 76 (26 Feb 2025 12:30) (76 - 103)  BP: 132/64 (26 Feb 2025 12:30) (118/59 - 145/77)  BP(mean): --  RR: 16 (26 Feb 2025 12:30) (16 - 18)  SpO2: 98% (26 Feb 2025 12:30) (96% - 98%)    Parameters below as of 26 Feb 2025 12:30  Patient On (Oxygen Delivery Method): room air          CONSTITUTIONAL: No apparent distress  HEENT: Normocephalic, Atraumatic  RESPIRATORY:  lungs are clear to auscultation bilaterally, breath sounds equal bilaterally  CARDIOVASCULAR: Regular rate and rhythm, No lower extremity edema  ABDOMEN: Soft, non-distended, nontender to palpation, +BS  PSYCH: thoughts linear, affect appropriate  NEUROLOGY: Alert, Oriented x3    LABS:                        13.4   8.47  )-----------( 283      ( 26 Feb 2025 06:35 )             42.7     02-26    141  |  107  |  25.6[H]  ----------------------------<  94  3.5   |  18.0[L]  |  0.71    Ca    9.0      26 Feb 2025 06:35    TPro  6.8  /  Alb  3.9  /  TBili  0.4  /  DBili  x   /  AST  36[H]  /  ALT  43[H]  /  AlkPhos  56  02-26          Urinalysis Basic - ( 26 Feb 2025 06:35 )    Color: x / Appearance: x / SG: x / pH: x  Gluc: 94 mg/dL / Ketone: x  / Bili: x / Urobili: x   Blood: x / Protein: x / Nitrite: x   Leuk Esterase: x / RBC: x / WBC x   Sq Epi: x / Non Sq Epi: x / Bacteria: x        Culture - Blood (collected 25 Feb 2025 09:32)  Source: .Blood Blood  Preliminary Report (26 Feb 2025 13:01):    No growth at 24 hours    Culture - Urine (collected 25 Feb 2025 07:05)  Source: Clean Catch Clean Catch (Midstream)  Preliminary Report (26 Feb 2025 09:57):    >100,000 CFU/ml Escherichia coli      CAPILLARY BLOOD GLUCOSE      POCT Blood Glucose.: 91 mg/dL (26 Feb 2025 17:03)  POCT Blood Glucose.: 102 mg/dL (26 Feb 2025 12:34)  POCT Blood Glucose.: 94 mg/dL (26 Feb 2025 07:58)  POCT Blood Glucose.: 69 mg/dL (26 Feb 2025 07:57)  POCT Blood Glucose.: 104 mg/dL (25 Feb 2025 20:54)        RADIOLOGY & ADDITIONAL TESTS:  Results Reviewed

## 2025-02-28 NOTE — DISCHARGE NOTE NURSING/CASE MANAGEMENT/SOCIAL WORK - NSDCPEFALRISK_GEN_ALL_CORE
For information on Fall & Injury Prevention, visit: [email or web address] OR  [email or web address] OR  https://www.cdc.gov/steadi/patient.html

## 2025-02-28 NOTE — PROGRESS NOTE ADULT - NS ATTEND AMEND GEN_ALL_CORE FT
I have seen and examined the patient and agree with the assessment and plan. [facility] today, prior to TAVR.

## 2025-02-28 NOTE — DISCHARGE NOTE PROVIDER - CARE PROVIDERS DIRECT ADDRESSES
,DirectAddress_Unknown,[email or web address] ,DirectAddress_Unknown,[email or web address]

## 2025-02-28 NOTE — PROGRESS NOTE ADULT - TIME BILLING
Time spent reviewing the chart documentation, reviewing labs and imaging studies, evaluating the patient, discussing the plan of care with the medical team and/or all necessary consultants, and documenting.
reviewing the chart documentation, reviewing labs and imaging studies, evaluating the patient, discussing the plan of care with the consultants & medical team, and documenting.
reviewing the chart documentation, reviewing labs and imaging studies, evaluating the patient, discussing the plan of care with the consultants & medical team, and documenting.

## 2025-02-28 NOTE — CHART NOTE - NSCHARTNOTEFT_GEN_A_CORE
Now s/p LHC via RRA with Dr. Keron Allen, pt tolerated procedure well. Pt arrived to recovery in NAD and HDS, RRA access site stable with radial band in place, no bleed/hematoma, distal pulse +2,  RUE/Right hand remains acyanotic; warm to touch; motor/sensory function intact; 2+ right radial pulse  Intraprocedural findings: RCA 30% stenosis otherwise mild luminal irregularities; PEAK to PEAK AV gradient 30mmHg; abdominal aortogram revealing moderately tortuous abdominal aorta and 50% stenosis (PRELIMINARY VERBAL REPORT FROM Dr. ALLEN; PENDING OFFICIAL REPORT)    Medications:  Lidocaine 2%: 10ml subcutaneous  Fentanyl: 50mcg IV  Versed: 2mg IV  Verapamil: 5mg IA  Nitroglycerin: 400mcg IA  Heparin: 4,000 units IV  Omnipaque: 59ml   Closure Device: RRA with radial band in place, no active bleeding or hematoma    Plan:  -Formal cath report pending  -Post procedure management/monitoring per protocol  -Access site precautions  -Radial compression band removal at 1000 AM  -Bedrest x 2 hours post procedure. Ok to ambulate at 1045 AM  -Labs and EKG in am  -NS 0.9% 250ml/hr x 1 bolus: post procedure KIRAN ppx   -Repeat ECG if any clinical indication or change on tele  -Continue current medical therapy  -Continue aspirin 81mg daily  -Cont statin therapy with Crestor 20mg po qHS   -Ok to resume IV heparin infusion 4 hours after radial band removed (if Radial site stable). Ok to resume IV heparin infusion at 2 pm. If no further inpatient work, ok to resume Eliquis tonight (if right wrist site stable)  -Educated regarding post procedure management and care  -Discussed the importance of RF modification  -F/U outpt in 1-2 weeks with Cardiologist Dr. velasco  -DISPO: Further plan per medicine team/ CT surgery Now s/p LHC via RRA with Dr. Keron Allen, pt tolerated procedure well. Pt arrived to recovery in NAD and HDS, RRA access site stable with radial band in place, no bleed/hematoma, distal pulse +2,  RUE/Right hand remains acyanotic; warm to touch; motor/sensory function intact; 2+ right radial pulse  Intraprocedural findings: RCA 30% stenosis otherwise mild luminal irregularities; PEAK to PEAK AV gradient 30mmHg; abdominal aortogram revealing moderately tortuous abdominal aorta and 50% stenosis (PRELIMINARY VERBAL REPORT FROM Dr. ALLEN; PENDING OFFICIAL REPORT)    Medications:  Lidocaine 2%: 10ml subcutaneous  Fentanyl: 50mcg IV  Versed: 2mg IV  Verapamil: 5mg IA  Nitroglycerin: 400mcg IA  Heparin: 4,000 units IV  Omnipaque: 59ml   Closure Device: RRA with radial band in place, no active bleeding or hematoma    Plan:  -Formal cath report pending  -Post procedure management/monitoring per protocol  -Access site precautions  -Radial compression band removal at 1000 AM  -Bedrest x 2 hours post procedure. Ok to ambulate at 1045 AM  -Labs and EKG in am  -NS 0.9% 250ml/hr x 1 bolus: post procedure KIRAN ppx   -Repeat ECG if any clinical indication or change on tele  -Continue current medical therapy  -Continue aspirin 81mg daily  -Cont statin therapy with Crestor 20mg po qHS   -Hold metformin x 48 hours due to contrast IV dye load. You can resume on Monday 3/3 AM  -Ok to resume IV heparin infusion 4 hours after radial band removed (if Radial site stable). Ok to resume IV heparin infusion at 2 pm. If no further inpatient work, ok to resume Eliquis tonight (if right wrist site stable)  -Educated regarding post procedure management and care  -Discussed the importance of RF modification  -F/U outpt in 1-2 weeks with Cardiologist Dr. velasco  -DISPO: Further plan per medicine team/ CT surgery

## 2025-02-28 NOTE — DISCHARGE NOTE PROVIDER - NSDCMRMEDTOKEN_GEN_ALL_CORE_FT
Crestor 20 mg oral tablet: 1 tab(s) orally once a day (at bedtime)  dilTIAZem 240 mg/24 hours oral tablet, extended release: 1 tab(s) orally once a day  Eliquis 5 mg oral tablet: 1 tab(s) orally 2 times a day  hydroCHLOROthiazide 25 mg oral tablet: 1 tab(s) orally once a day  hydroxychloroquine 200 mg oral tablet: 1 tab(s) orally  metFORMIN 500 mg oral tablet: 1 tab(s) orally 2 times a day  metoprolol tartrate 50 mg oral tablet: 1 tab(s) orally 2 times a day  olmesartan 40 mg oral tablet: 1 tab(s) orally once a day  omeprazole 40 mg oral delayed release capsule: 1 cap(s) orally 2 times a day  Percocet 7.5 mg-325 mg oral tablet: 1 tab(s) orally every 6 hours as needed for  severe pain

## 2025-02-28 NOTE — DISCHARGE NOTE NURSING/CASE MANAGEMENT/SOCIAL WORK - PATIENT PORTAL LINK FT
You can access the FollowMyHealth Patient Portal offered by [facility] by registering at the following website: [email or web address]. By joining [facility]’s FollowMyHealth portal, you will also be able to view your health information using other applications (apps) compatible with our system.

## 2025-02-28 NOTE — DISCHARGE NOTE PROVIDER - NSDCACTIVITY_GEN_ALL_CORE
Activity as tolerated Do not drive or operate machinery/Showering allowed/Do not make important decisions/Walking - Indoors allowed/No heavy lifting/straining/Walking - Outdoors allowed

## 2025-02-28 NOTE — DISCHARGE NOTE NURSING/CASE MANAGEMENT/SOCIAL WORK - FINANCIAL ASSISTANCE
[facility] provides services at a reduced cost to those who are determined to be eligible through [facility]’s financial assistance program. Information regarding [facility]’s financial assistance program can be found by going to [email or web address] or by calling [phone].
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
## 2025-03-01 LAB
-  AMPICILLIN/SULBACTAM: SIGNIFICANT CHANGE UP
-  AMPICILLIN: SIGNIFICANT CHANGE UP
-  AZTREONAM: SIGNIFICANT CHANGE UP
-  CEFAZOLIN: SIGNIFICANT CHANGE UP
-  CEFEPIME: SIGNIFICANT CHANGE UP
-  CEFOXITIN: SIGNIFICANT CHANGE UP
-  CEFTRIAXONE: SIGNIFICANT CHANGE UP
-  CEFUROXIME: SIGNIFICANT CHANGE UP
-  CIPROFLOXACIN: SIGNIFICANT CHANGE UP
-  ERTAPENEM: SIGNIFICANT CHANGE UP
-  GENTAMICIN: SIGNIFICANT CHANGE UP
-  IMIPENEM: SIGNIFICANT CHANGE UP
-  LEVOFLOXACIN: SIGNIFICANT CHANGE UP
-  MEROPENEM: SIGNIFICANT CHANGE UP
-  NITROFURANTOIN: SIGNIFICANT CHANGE UP
-  PIPERACILLIN/TAZOBACTAM: SIGNIFICANT CHANGE UP
-  TOBRAMYCIN: SIGNIFICANT CHANGE UP
-  TRIMETHOPRIM/SULFAMETHOXAZOLE: SIGNIFICANT CHANGE UP
CULTURE RESULTS: ABNORMAL
METHOD TYPE: SIGNIFICANT CHANGE UP
ORGANISM # SPEC MICROSCOPIC CNT: ABNORMAL
ORGANISM # SPEC MICROSCOPIC CNT: SIGNIFICANT CHANGE UP
SPECIMEN SOURCE: SIGNIFICANT CHANGE UP

## 2025-03-02 LAB
CULTURE RESULTS: SIGNIFICANT CHANGE UP
SPECIMEN SOURCE: SIGNIFICANT CHANGE UP

## 2025-03-04 ENCOUNTER — NON-APPOINTMENT (OUTPATIENT)
Age: 75
End: 2025-03-04

## 2025-03-05 PROBLEM — I73.00 RAYNAUD'S SYNDROME WITHOUT GANGRENE: Chronic | Status: ACTIVE | Noted: 2025-02-26

## 2025-03-05 PROBLEM — A77.0 SPOTTED FEVER DUE TO RICKETTSIA RICKETTSII: Chronic | Status: ACTIVE | Noted: 2025-02-26

## 2025-03-05 PROBLEM — L94.3 SCLERODACTYLY: Chronic | Status: ACTIVE | Noted: 2025-02-26

## 2025-03-05 PROBLEM — M34.9 SYSTEMIC SCLEROSIS, UNSPECIFIED: Chronic | Status: ACTIVE | Noted: 2025-02-26

## 2025-03-05 PROBLEM — R91.8 OTHER NONSPECIFIC ABNORMAL FINDING OF LUNG FIELD: Chronic | Status: ACTIVE | Noted: 2025-02-26

## 2025-03-05 PROBLEM — M34.89 OTHER SYSTEMIC SCLEROSIS: Chronic | Status: ACTIVE | Noted: 2025-02-26

## 2025-03-05 PROBLEM — I78.1 NEVUS, NON-NEOPLASTIC: Chronic | Status: ACTIVE | Noted: 2025-02-26

## 2025-03-05 RX ORDER — OLMESARTAN MEDOXOMIL 40 MG/1
TABLET, FILM COATED ORAL
Refills: 0 | Status: ACTIVE | COMMUNITY

## 2025-03-10 ENCOUNTER — APPOINTMENT (OUTPATIENT)
Dept: FAMILY MEDICINE | Facility: CLINIC | Age: 75
End: 2025-03-10

## 2025-03-10 DIAGNOSIS — Z09 ENCOUNTER FOR FOLLOW-UP EXAMINATION AFTER COMPLETED TREATMENT FOR CONDITIONS OTHER THAN MALIGNANT NEOPLASM: ICD-10-CM

## 2025-03-10 DIAGNOSIS — E11.9 TYPE 2 DIABETES MELLITUS W/OUT COMPLICATIONS: ICD-10-CM

## 2025-03-10 DIAGNOSIS — M54.9 DORSALGIA, UNSPECIFIED: ICD-10-CM

## 2025-03-10 DIAGNOSIS — I48.91 UNSPECIFIED ATRIAL FIBRILLATION: ICD-10-CM

## 2025-03-10 DIAGNOSIS — I10 ESSENTIAL (PRIMARY) HYPERTENSION: ICD-10-CM

## 2025-03-18 ENCOUNTER — APPOINTMENT (OUTPATIENT)
Dept: RHEUMATOLOGY | Facility: CLINIC | Age: 75
End: 2025-03-18

## 2025-03-26 ENCOUNTER — RX RENEWAL (OUTPATIENT)
Age: 75
End: 2025-03-26

## 2025-03-27 ENCOUNTER — OUTPATIENT (OUTPATIENT)
Dept: OUTPATIENT SERVICES | Facility: HOSPITAL | Age: 75
LOS: 1 days | End: 2025-03-27
Payer: MEDICARE

## 2025-03-27 VITALS
SYSTOLIC BLOOD PRESSURE: 118 MMHG | WEIGHT: 169.76 LBS | TEMPERATURE: 98 F | RESPIRATION RATE: 16 BRPM | HEART RATE: 65 BPM | DIASTOLIC BLOOD PRESSURE: 58 MMHG | HEIGHT: 65 IN | OXYGEN SATURATION: 98 %

## 2025-03-27 DIAGNOSIS — Z98.890 OTHER SPECIFIED POSTPROCEDURAL STATES: Chronic | ICD-10-CM

## 2025-03-27 DIAGNOSIS — Z01.818 ENCOUNTER FOR OTHER PREPROCEDURAL EXAMINATION: ICD-10-CM

## 2025-03-27 DIAGNOSIS — M34.89 OTHER SYSTEMIC SCLEROSIS: ICD-10-CM

## 2025-03-27 DIAGNOSIS — Z29.9 ENCOUNTER FOR PROPHYLACTIC MEASURES, UNSPECIFIED: ICD-10-CM

## 2025-03-27 DIAGNOSIS — Z91.89 OTHER SPECIFIED PERSONAL RISK FACTORS, NOT ELSEWHERE CLASSIFIED: ICD-10-CM

## 2025-03-27 DIAGNOSIS — I35.0 NONRHEUMATIC AORTIC (VALVE) STENOSIS: ICD-10-CM

## 2025-03-27 DIAGNOSIS — Z78.9 OTHER SPECIFIED HEALTH STATUS: ICD-10-CM

## 2025-03-27 DIAGNOSIS — I48.91 UNSPECIFIED ATRIAL FIBRILLATION: ICD-10-CM

## 2025-03-27 DIAGNOSIS — I10 ESSENTIAL (PRIMARY) HYPERTENSION: ICD-10-CM

## 2025-03-27 DIAGNOSIS — E11.9 TYPE 2 DIABETES MELLITUS WITHOUT COMPLICATIONS: ICD-10-CM

## 2025-03-27 LAB
A1C WITH ESTIMATED AVERAGE GLUCOSE RESULT: 5.8 % — HIGH (ref 4–5.6)
ALBUMIN SERPL ELPH-MCNC: 4.3 G/DL — SIGNIFICANT CHANGE UP (ref 3.3–5.2)
ALP SERPL-CCNC: 48 U/L — SIGNIFICANT CHANGE UP (ref 40–120)
ALT FLD-CCNC: 17 U/L — SIGNIFICANT CHANGE UP
ANION GAP SERPL CALC-SCNC: 13 MMOL/L — SIGNIFICANT CHANGE UP (ref 5–17)
APTT BLD: 36.3 SEC — HIGH (ref 24.5–35.6)
AST SERPL-CCNC: 18 U/L — SIGNIFICANT CHANGE UP
BASOPHILS # BLD AUTO: 0.07 K/UL — SIGNIFICANT CHANGE UP (ref 0–0.2)
BASOPHILS NFR BLD AUTO: 0.6 % — SIGNIFICANT CHANGE UP (ref 0–2)
BILIRUB SERPL-MCNC: 0.3 MG/DL — LOW (ref 0.4–2)
BLD GP AB SCN SERPL QL: SIGNIFICANT CHANGE UP
BUN SERPL-MCNC: 22.3 MG/DL — HIGH (ref 8–20)
CALCIUM SERPL-MCNC: 9.6 MG/DL — SIGNIFICANT CHANGE UP (ref 8.4–10.5)
CHLORIDE SERPL-SCNC: 105 MMOL/L — SIGNIFICANT CHANGE UP (ref 96–108)
CO2 SERPL-SCNC: 22 MMOL/L — SIGNIFICANT CHANGE UP (ref 22–29)
CREAT SERPL-MCNC: 0.95 MG/DL — SIGNIFICANT CHANGE UP (ref 0.5–1.3)
EGFR: 62 ML/MIN/1.73M2 — SIGNIFICANT CHANGE UP
EGFR: 62 ML/MIN/1.73M2 — SIGNIFICANT CHANGE UP
EOSINOPHIL # BLD AUTO: 0.12 K/UL — SIGNIFICANT CHANGE UP (ref 0–0.5)
EOSINOPHIL NFR BLD AUTO: 1 % — SIGNIFICANT CHANGE UP (ref 0–6)
ESTIMATED AVERAGE GLUCOSE: 120 MG/DL — HIGH (ref 68–114)
GLUCOSE SERPL-MCNC: 103 MG/DL — HIGH (ref 70–99)
HCT VFR BLD CALC: 39.7 % — SIGNIFICANT CHANGE UP (ref 34.5–45)
HGB BLD-MCNC: 12.1 G/DL — SIGNIFICANT CHANGE UP (ref 11.5–15.5)
IMM GRANULOCYTES # BLD AUTO: 0.04 K/UL — SIGNIFICANT CHANGE UP (ref 0–0.07)
IMM GRANULOCYTES NFR BLD AUTO: 0.3 % — SIGNIFICANT CHANGE UP (ref 0–0.9)
INR BLD: 1.57 RATIO — HIGH (ref 0.85–1.16)
LYMPHOCYTES # BLD AUTO: 1.58 K/UL — SIGNIFICANT CHANGE UP (ref 1–3.3)
LYMPHOCYTES NFR BLD AUTO: 13.3 % — SIGNIFICANT CHANGE UP (ref 13–44)
MCHC RBC-ENTMCNC: 28.1 PG — SIGNIFICANT CHANGE UP (ref 27–34)
MCHC RBC-ENTMCNC: 30.5 G/DL — LOW (ref 32–36)
MCV RBC AUTO: 92.1 FL — SIGNIFICANT CHANGE UP (ref 80–100)
MONOCYTES # BLD AUTO: 0.75 K/UL — SIGNIFICANT CHANGE UP (ref 0–0.9)
MONOCYTES NFR BLD AUTO: 6.3 % — SIGNIFICANT CHANGE UP (ref 2–14)
MRSA PCR RESULT.: SIGNIFICANT CHANGE UP
NEUTROPHILS # BLD AUTO: 9.31 K/UL — HIGH (ref 1.8–7.4)
NEUTROPHILS NFR BLD AUTO: 78.5 % — HIGH (ref 43–77)
NRBC # BLD AUTO: 0 K/UL — SIGNIFICANT CHANGE UP (ref 0–0)
NRBC # FLD: 0 K/UL — SIGNIFICANT CHANGE UP (ref 0–0)
NRBC BLD AUTO-RTO: 0 /100 WBCS — SIGNIFICANT CHANGE UP (ref 0–0)
NT-PROBNP SERPL-SCNC: 679 PG/ML — HIGH (ref 0–300)
PLATELET # BLD AUTO: 318 K/UL — SIGNIFICANT CHANGE UP (ref 150–400)
PMV BLD: 10.1 FL — SIGNIFICANT CHANGE UP (ref 7–13)
POTASSIUM SERPL-MCNC: 5.3 MMOL/L — SIGNIFICANT CHANGE UP (ref 3.5–5.3)
POTASSIUM SERPL-SCNC: 5.3 MMOL/L — SIGNIFICANT CHANGE UP (ref 3.5–5.3)
PREALB SERPL-MCNC: 28 MG/DL — SIGNIFICANT CHANGE UP (ref 18–38)
PROT SERPL-MCNC: 7.5 G/DL — SIGNIFICANT CHANGE UP (ref 6.6–8.7)
PROTHROM AB SERPL-ACNC: 18.1 SEC — HIGH (ref 9.9–13.4)
RBC # BLD: 4.31 M/UL — SIGNIFICANT CHANGE UP (ref 3.8–5.2)
RBC # FLD: 15.3 % — HIGH (ref 10.3–14.5)
S AUREUS DNA NOSE QL NAA+PROBE: SIGNIFICANT CHANGE UP
SODIUM SERPL-SCNC: 140 MMOL/L — SIGNIFICANT CHANGE UP (ref 135–145)
T3 SERPL-MCNC: 106 NG/DL — SIGNIFICANT CHANGE UP (ref 80–200)
T4 AB SER-ACNC: 9 UG/DL — SIGNIFICANT CHANGE UP (ref 4.5–12)
TSH SERPL-MCNC: 1.02 UIU/ML — SIGNIFICANT CHANGE UP (ref 0.27–4.2)
WBC # BLD: 11.87 K/UL — HIGH (ref 3.8–10.5)
WBC # FLD AUTO: 11.87 K/UL — HIGH (ref 3.8–10.5)

## 2025-03-27 PROCEDURE — 93010 ELECTROCARDIOGRAM REPORT: CPT

## 2025-03-27 RX ORDER — CEFUROXIME SODIUM 1.5 G
1500 VIAL (EA) INJECTION ONCE
Refills: 0 | Status: DISCONTINUED | OUTPATIENT
Start: 2025-04-11 | End: 2025-04-12

## 2025-03-27 NOTE — H&P PST ADULT - PROBLEM SELECTOR PROBLEM 6
Nurses Note -- 4 Eyes      2/13/2023   07:04 AM      Skin assessed during: Q Shift Change      [x] No Pressure Injuries Present    []Prevention Measures Documented      [] Yes- Altered Skin Integrity Present or Discovered   [] LDA Added if Not in Epic (Describe Wound)   [] New Altered Skin Integrity was Present on Admit and Documented in LDA   [] Wound Image Taken    Wound Care Consulted? No    Attending Nurse:  Kathrin Jewell LPN     Second RN/Staff Member:  PEDRITO LEWIS  *S/P RIGHT KNEE PLACEMWNT (SURGICAL SITE)       Afib

## 2025-03-27 NOTE — H&P PST ADULT - PROBLEM SELECTOR PLAN 1
PST  3/27  in anticipation of scheduled  TAVR LEFT CAROTID on 4/11/25  at [facility] w/DR SIMRAN COHEN.  Patient educated on no shaving x 3 days prior to procedure, correct use of surgical scrub, NPO after MN,  preadmission instructions, day of procedure medications. Pt instructed to stop vitamins/supplements/herbal medications/NSAIDS for one week prior to surgery and discuss with PMD/PRESCIRBER/SPECIALIST any outstanding items or questions about prescriptions/medications. Written and oral instructions given to patient.

## 2025-03-27 NOTE — H&P PST ADULT - NSICDXPASTSURGICALHX_GEN_ALL_CORE_FT
PAST SURGICAL HISTORY:  History of breast surgery     History of colectomy     History of colostomy reversal     History of hernia repair      PAST SURGICAL HISTORY:  History of breast surgery     History of colectomy     History of colostomy reversal     History of hernia repair     History of spinal surgery

## 2025-03-27 NOTE — H&P PST ADULT - HISTORY OF PRESENT ILLNESS
74y Female Afib s/p ablation (on Eliquis), T2DM, HTN, systemic sclerosis, and     vertigo presented BIBEMS with one month of intermittent dizziness that worsened     over 4-5 days. Symptoms were described as positional head pressure and     off-balance sensation, improved with lying flat, distinct from her previous     vertigo episodes. Initial workup revealed stable troponins (11-->11), NSR on     ECG, and CTA showing 35% L ICA stenosis. Patient initially improved with     meclizine and was placed on cardiac telemetry for observation. However,     overnight patient developed acute altered mental status with agitation     requiring Zyprexa and Haldol for management. Labs showing leukocytosis of     11.8k, elevated anion gap of 20, and UA positive for nitrites. Repeat head CT     was negative. Pt refused MRI. Patient was started on IV Rocephin for presumed     UTI with delirium and admitted to the hospital for further management. Urine     culture + for >100,000 E. Coli. TTE done with EF 67 % and Severe AS with an KATHY     0.69 and MG 46. CT surgery called for TAVR evaluation. Ed          74F with PMHx of Afib s/p ablation (on Eliquis), T2DM, HTN, systemic sclerosis,     and vertigo presented BIBEMS with one month of intermittent dizziness that     worsened over 4-5 days. Symptoms were described as positional head pressure and     off-balance sensation, improved with lying flat, distinct from her previous     vertigo episodes. Initial workup revealed stable troponins (11-->11), NSR on     ECG, and CTA showing 35% L ICA stenosis. Patient initially improved with     meclizine and was placed on cardiac telemetry for observation. However,     overnight patient developed acute altered mental status with agitation     requiring Zyprexa and Haldol for management. Morning evaluation revealed     patient was A&O x2 with labs showing leukocytosis of 11.8k, elevated anion gap     of 20, and UA positive for nitrites. Repeat head CT was negative. Patient was     started on IV Rocephin for presumed UTI with delirium and admitted to the     hospital for further management. Patient on examination similar to morning     evaluation AOx2 however not answering any other questions and limited     participation in history. Denies being in pain.     74F with PMHx of Afib s/p ablation (on Eliquis), T2DM, HTN, systemic sclerosis,     and vertigo presenting with progressive dizziness x1 month, worsening over 4-5     days. Initial workup with CTA showed 35% L ICA stenosis. Course complicated by     acute delirium with positive UA, requiring antipsychotics and antibiotics.     Admitted to medicine, started on pain meds, Echo work up, CT surgery, patient's     mental status improved, underwent cath procedure, on DC patient clinically     improved, deemed fit for DC home w/ close follow up with cardiology and CT     surgery.  75 y/o  F seen in PST  3/27  in anticipation of scheduled  TAVR LEFT CAROTID on 4/11/25  at [facility] w/DR SIMRAN COHEN.  PMHX: Afib/Ablation/Eliquis, DM2, HTN, Systemic Sclerosis/Scleroderma, Vertigo.  Reports began having episodes of vertigo and dizziness in Fall of 2024 which continually worsened and eventually prompted her to go to [facility] ER in Feb 2025.  Reported symptoms of feeling off balance which improved with sitting or laying down.  When she got to the ER workup showed a CTA with 35% L ICA stenosis. She showed some improvement with meclizine but ended up requiring admission for delirium/AMS with agitation requiring antipsychotics. Urine  culture + for >100,000 E. Coli and WBC+Leukocytosis which required IV Rocephin.  Her mental status improved and she had cardiac evaluation, underwent LHC in preparation of TAVR evaluation.  TTE done with EF 67 % and Severe AS.  She was discharged after 5 day admission.  She reports still having intermittent dizziness spells and is afraid to ambulate too far.  Does not use a walker/cane/wheelchair at home but today in PST she requested to use wheelchair from lobby to exam room as she is unsure of feeling dizzy and doesn't want to fall.  Denies chest pain, palpitations, edema, sob, gillespie, dysuria, change in bowel/bladder habits.  No recent URI symptoms, no steroid use or antibiotics except for the IV doses in the hospital.

## 2025-03-27 NOTE — H&P PST ADULT - NSICDXPASTMEDICALHX_GEN_ALL_CORE_FT
PAST MEDICAL HISTORY:  Afib     Carotid artery stenosis     Diabetes     Diffuse cutaneous systemic sclerosis     Hypertension     Lung nodules     Raynaud's phenomenon     [place] spotted fever     Sclerodactyly     Scleroderma     Telangiectasia      PAST MEDICAL HISTORY:  Afib     Carotid artery stenosis     Diabetes     Diffuse cutaneous systemic sclerosis     Hypertension     Lung nodules     Nonrheumatic aortic valve stenosis     Raynaud's phenomenon     [place] spotted fever     Sclerodactyly     Scleroderma     Spinal stenosis     Telangiectasia     Under care of pain management specialist

## 2025-03-27 NOTE — H&P PST ADULT - NSICDXFAMILYHX_GEN_ALL_CORE_FT
FAMILY HISTORY:  Mother  Still living? Unknown  Family history of congestive heart failure, Age at diagnosis: Age Unknown  Family history of coronary artery bypass surgery, Age at diagnosis: 71-80  Family history of coronary artery disease, Age at diagnosis: Age Unknown

## 2025-03-27 NOTE — H&P PST ADULT - PROBLEM SELECTOR PLAN 6
Continue Metoprolol and take day of surgery with small sip of water at least 2 hours prior to start time

## 2025-03-27 NOTE — H&P PST ADULT - PROBLEM SELECTOR PLAN 3
Instructed to check with prescriber and/or surgical team regarding any changes to medication regimen capo-operatively for Hydroxychloroquine

## 2025-03-27 NOTE — H&P PST ADULT - ASSESSMENT
73 y/o  F seen in PST  3/27  in anticipation of scheduled  TAVR LEFT CAROTID on 25  at [facility] w/DR SIMRAN COHEN.  PMHX: Afib/Ablation/Eliquis, DM2, HTN, Systemic Sclerosis/Scleroderma, Vertigo.  Reports began having episodes of vertigo and dizziness in Fall of  which continually worsened and eventually prompted her to go to [facility] ER in 2025.  Reported symptoms of feeling off balance which improved with sitting or laying down.  When she got to the ER workup showed a CTA with 35% L ICA stenosis. She showed some improvement with meclizine but ended up requiring admission for delirium/AMS with agitation requiring antipsychotics. Urine  culture + for >100,000 E. Coli and WBC+Leukocytosis which required IV Rocephin.  Her mental status improved and she had cardiac evaluation, underwent LHC in preparation of TAVR evaluation.  TTE done with EF 67 % and Severe AS.  She was discharged after 5 day admission.  She reports still having intermittent dizziness spells and is afraid to ambulate too far.  Does not use a walker/cane/wheelchair at home but today in PST she requested to use wheelchair from lobby to exam room as she is unsure of feeling dizzy and doesn't want to fall.  Denies chest pain, palpitations, edema, sob, gillespie, dysuria, change in bowel/bladder habits.  No recent URI symptoms, no steroid use or antibiotics except for the IV doses in the hospital.    OPIOID RISK TOOL    ZANDER EACH BOX THAT APPLIES AND ADD TOTALS AT THE END    FAMILY HISTORY OF SUBSTANCE ABUSE                 FEMALE         MALE                                               Alcohol                             [  ]1 pt          [  ]3pts                                               Illegal Durgs                     [  ]2 pts        [  ]3pts                                               Rx Drugs                           [  ]4 pts        [  ]4 pts    PERSONAL HISTORY OF SUBSTANCE ABUSE                                                                                         Alcohol                             [  ]3 pts       [  ]3 pts                                               Illegal Durgs                     [  ]4 pts        [  ]4 pts                                               Rx Drugs                           [  ]5 pts        [  ]5 pts    AGE BETWEEN 16-45 YEARS                                      [  ]1 pt         [  ]1 pt    HISTORY OF PREADOLESCENT  SEXUAL ABUSE                                                             [  ]3 pts        [  ]0pts    PSYCHOLOGICAL DISEASE                     ADD, OCD, Bipolar, Schizophrenia        [  ]2 pts         [  ]2 pts                      Depression                                               [  ]1 pt           [  ]1 pt          Total: 0  A score of 3 or lower indicated LOW risk for future opiod abuse  A score of 4 to 7 indicated moderate risk for future opiod abuse  A score of 8 or higher indicates a high risk for opiod abuse         CAPRINI SCORE    AGE RELATED RISK FACTORS                                                             [ ] Age 41-60 years                                            (1 Point)  [ ] Age: 61-74 years                                           (2 Points)                 [X ] Age= 75 years                                                (3 Points)             DISEASE RELATED RISK FACTORS                                                       [ ] Edema in the lower extremities                 (1 Point)                     [ ] Varicose veins                                               (1 Point)                                 [X ] BMI > 25 Kg/m2                                            (1 Point)                                  [ ] Serious infection (ie PNA)                            (1 Point)                     [ ] Lung disease ( COPD, Emphysema)            (1 Point)                                                                          [ ] Acute myocardial infarction                         (1 Point)                  [ ] Congestive heart failure (in the previous month)  (1 Point)         [ ] Inflammatory bowel disease                            (1 Point)                  [ ] Central venous access, PICC or Port               (2 points)       (within the last month)                                                                [ ] Stroke (in the previous month)                        (5 Points)    [ ] Previous or present malignancy                       (2 points)                                                                                                                                                         HEMATOLOGY RELATED FACTORS                                                         [ ] Prior episodes of VTE                                     (3 Points)                     [ ] Positive family history for VTE                      (3 Points)                  [ ] Prothrombin 92543 A                                     (3 Points)                     [ ] Factor V Leiden                                                (3 Points)                        [ ] Lupus anticoagulants                                      (3 Points)                                                           [ ] Anticardiolipin antibodies                              (3 Points)                                                       [ ] High homocysteine in the blood                   (3 Points)                                             [X ] Other congenital or acquired thrombophilia      (3 Points)                                                [ ] Heparin induced thrombocytopenia                  (3 Points)                                        MOBILITY RELATED FACTORS  [ ] Bed rest                                                         (1 Point)  [ ] Plaster cast                                                    (2 points)  [ ] Bed bound for more than 72 hours           (2 Points)    GENDER SPECIFIC FACTORS  [ ] Pregnancy or had a baby within the last month   (1 Point)  [ ] Post-partum < 6 weeks                                   (1 Point)  [ ] Hormonal therapy  or oral contraception   (1 Point)  [ ] History of pregnancy complications              (1 point)  [ ] Unexplained or recurrent              (1 Point)    OTHER RISK FACTORS                                           (1 Point)  [ ] BMI >40, smoking, diabetes requiring insulin, chemotherapy  blood transfusions and length of surgery over 2 hours    SURGERY RELATED RISK FACTORS  [ ]  Section within the last month     (1 Point)  [ ] Minor surgery                                                  (1 Point)  [ ] Arthroscopic surgery                                       (2 Points)  [ X] Planned major surgery lasting more            (2 Points)      than 45 minutes     [ ] Elective hip or knee joint replacement       (5 points)       surgery                                                TRAUMA RELATED RISK FACTORS  [ ] Fracture of the hip, pelvis, or leg                       (5 Points)  [ ] Spinal cord injury resulting in paralysis             (5 points)       (in the previous month)    [ ] Paralysis  (less than 1 month)                             (5 Points)  [ ] Multiple Trauma within 1 month                        (5 Points)    Total Score [  9   ]    Caprini Score 0-2: Low Risk, NO VTE prophylaxis required for most patients, encourage ambulation  Caprini Score 3-6: Moderate Risk , pharmacologic VTE prophylaxis is indicated for most patients (in the absence of contraindications)  Caprini Score Greater than or =7: High risk, pharmocologic VTE prophylaxis indicated for most patients (in the absence of contraindications)

## 2025-03-27 NOTE — H&P PST ADULT - PROBLEM SELECTOR PLAN 5
Continue Metoprolol and Olmesartan as ordered by prescriber. Verbal and written instructions given to continue and take meds day of surgery with small sip of water at least 2 hours prior to start time unless otherwise instructed by prescriber/surgeon.   Do not take HCTZ on day of surgery

## 2025-03-27 NOTE — H&P PST ADULT - PROBLEM SELECTOR PLAN 7
Surgical team to address    Total Score [  9   ]  Caprini Score Greater than or =7: High risk, pharmocologic VTE prophylaxis indicated for most patients (in the absence of contraindications)

## 2025-03-27 NOTE — H&P PST ADULT - NSANTHOSAYNRD_GEN_A_CORE
No. BRITTNEE screening performed.  STOP BANG Legend: 0-2 = LOW Risk; 3-4 = INTERMEDIATE Risk; 5-8 = HIGH Risk

## 2025-03-28 LAB
CULTURE RESULTS: SIGNIFICANT CHANGE UP
SPECIMEN SOURCE: SIGNIFICANT CHANGE UP

## 2025-04-01 ENCOUNTER — OUTPATIENT (OUTPATIENT)
Dept: OUTPATIENT SERVICES | Facility: HOSPITAL | Age: 75
LOS: 1 days | End: 2025-04-01

## 2025-04-01 DIAGNOSIS — Z01.812 ENCOUNTER FOR PREPROCEDURAL LABORATORY EXAMINATION: ICD-10-CM

## 2025-04-01 DIAGNOSIS — Z98.890 OTHER SPECIFIED POSTPROCEDURAL STATES: Chronic | ICD-10-CM

## 2025-04-01 PROBLEM — M48.00 SPINAL STENOSIS, SITE UNSPECIFIED: Chronic | Status: ACTIVE | Noted: 2025-03-27

## 2025-04-01 PROBLEM — I35.0 NONRHEUMATIC AORTIC (VALVE) STENOSIS: Chronic | Status: ACTIVE | Noted: 2025-03-27

## 2025-04-01 PROBLEM — Z78.9 OTHER SPECIFIED HEALTH STATUS: Chronic | Status: ACTIVE | Noted: 2025-03-27

## 2025-04-11 ENCOUNTER — APPOINTMENT (OUTPATIENT)
Dept: CARDIOTHORACIC SURGERY | Facility: HOSPITAL | Age: 75
End: 2025-04-11

## 2025-04-11 ENCOUNTER — INPATIENT (INPATIENT)
Facility: HOSPITAL | Age: 75
LOS: 0 days | Discharge: HOME HEALTH | DRG: 307 | End: 2025-04-12
Attending: THORACIC SURGERY (CARDIOTHORACIC VASCULAR SURGERY) | Admitting: THORACIC SURGERY (CARDIOTHORACIC VASCULAR SURGERY)
Payer: MEDICARE

## 2025-04-11 ENCOUNTER — TRANSCRIPTION ENCOUNTER (OUTPATIENT)
Age: 75
End: 2025-04-11

## 2025-04-11 ENCOUNTER — RESULT REVIEW (OUTPATIENT)
Age: 75
End: 2025-04-11

## 2025-04-11 VITALS
HEART RATE: 72 BPM | OXYGEN SATURATION: 97 % | DIASTOLIC BLOOD PRESSURE: 70 MMHG | SYSTOLIC BLOOD PRESSURE: 140 MMHG | WEIGHT: 169.76 LBS | HEIGHT: 68 IN | TEMPERATURE: 98 F | RESPIRATION RATE: 16 BRPM

## 2025-04-11 DIAGNOSIS — Z98.890 OTHER SPECIFIED POSTPROCEDURAL STATES: Chronic | ICD-10-CM

## 2025-04-11 DIAGNOSIS — I35.0 NONRHEUMATIC AORTIC (VALVE) STENOSIS: ICD-10-CM

## 2025-04-11 LAB
ALBUMIN SERPL ELPH-MCNC: 3.5 G/DL — SIGNIFICANT CHANGE UP (ref 3.3–5.2)
ALP SERPL-CCNC: 38 U/L — LOW (ref 40–120)
ALT FLD-CCNC: 12 U/L — SIGNIFICANT CHANGE UP
ANION GAP SERPL CALC-SCNC: 15 MMOL/L — SIGNIFICANT CHANGE UP (ref 5–17)
ANION GAP SERPL CALC-SCNC: 17 MMOL/L — SIGNIFICANT CHANGE UP (ref 5–17)
APTT BLD: 30 SEC — SIGNIFICANT CHANGE UP (ref 24.5–35.6)
APTT BLD: 35.5 SEC — SIGNIFICANT CHANGE UP (ref 24.5–35.6)
AST SERPL-CCNC: 16 U/L — SIGNIFICANT CHANGE UP
BASE EXCESS BLDA CALC-SCNC: 0.3 MMOL/L — SIGNIFICANT CHANGE UP (ref -2–3)
BASOPHILS # BLD AUTO: 0.03 K/UL — SIGNIFICANT CHANGE UP (ref 0–0.2)
BASOPHILS # BLD AUTO: 0.06 K/UL — SIGNIFICANT CHANGE UP (ref 0–0.2)
BASOPHILS NFR BLD AUTO: 0.2 % — SIGNIFICANT CHANGE UP (ref 0–2)
BASOPHILS NFR BLD AUTO: 0.6 % — SIGNIFICANT CHANGE UP (ref 0–2)
BILIRUB SERPL-MCNC: 0.3 MG/DL — LOW (ref 0.4–2)
BLD GP AB SCN SERPL QL: SIGNIFICANT CHANGE UP
BUN SERPL-MCNC: 17.8 MG/DL — SIGNIFICANT CHANGE UP (ref 8–20)
BUN SERPL-MCNC: 20.3 MG/DL — HIGH (ref 8–20)
CA-I BLDA-SCNC: 1.22 MMOL/L — SIGNIFICANT CHANGE UP (ref 1.15–1.33)
CALCIUM SERPL-MCNC: 8.1 MG/DL — LOW (ref 8.4–10.5)
CALCIUM SERPL-MCNC: 9.5 MG/DL — SIGNIFICANT CHANGE UP (ref 8.4–10.5)
CHLORIDE BLDA-SCNC: 107 MMOL/L — SIGNIFICANT CHANGE UP (ref 96–108)
CHLORIDE SERPL-SCNC: 103 MMOL/L — SIGNIFICANT CHANGE UP (ref 96–108)
CHLORIDE SERPL-SCNC: 104 MMOL/L — SIGNIFICANT CHANGE UP (ref 96–108)
CO2 SERPL-SCNC: 19 MMOL/L — LOW (ref 22–29)
CO2 SERPL-SCNC: 21 MMOL/L — LOW (ref 22–29)
COHGB MFR BLDA: 0 % — SIGNIFICANT CHANGE UP
CREAT SERPL-MCNC: 0.63 MG/DL — SIGNIFICANT CHANGE UP (ref 0.5–1.3)
CREAT SERPL-MCNC: 0.73 MG/DL — SIGNIFICANT CHANGE UP (ref 0.5–1.3)
EGFR: 86 ML/MIN/1.73M2 — SIGNIFICANT CHANGE UP
EGFR: 86 ML/MIN/1.73M2 — SIGNIFICANT CHANGE UP
EGFR: 92 ML/MIN/1.73M2 — SIGNIFICANT CHANGE UP
EGFR: 92 ML/MIN/1.73M2 — SIGNIFICANT CHANGE UP
EOSINOPHIL # BLD AUTO: 0.07 K/UL — SIGNIFICANT CHANGE UP (ref 0–0.5)
EOSINOPHIL # BLD AUTO: 0.17 K/UL — SIGNIFICANT CHANGE UP (ref 0–0.5)
EOSINOPHIL NFR BLD AUTO: 0.5 % — SIGNIFICANT CHANGE UP (ref 0–6)
EOSINOPHIL NFR BLD AUTO: 1.7 % — SIGNIFICANT CHANGE UP (ref 0–6)
GAS PNL BLDA: SIGNIFICANT CHANGE UP
GLUCOSE BLDA-MCNC: 126 MG/DL — HIGH (ref 70–99)
GLUCOSE SERPL-MCNC: 100 MG/DL — HIGH (ref 70–99)
GLUCOSE SERPL-MCNC: 170 MG/DL — HIGH (ref 70–99)
HCO3 BLDA-SCNC: 25 MMOL/L — SIGNIFICANT CHANGE UP (ref 21–28)
HCT VFR BLD CALC: 32.8 % — LOW (ref 34.5–45)
HCT VFR BLD CALC: 36.7 % — SIGNIFICANT CHANGE UP (ref 34.5–45)
HCT VFR BLDA CALC: 35 % — SIGNIFICANT CHANGE UP
HGB BLD-MCNC: 10.3 G/DL — LOW (ref 11.5–15.5)
HGB BLD-MCNC: 11.5 G/DL — SIGNIFICANT CHANGE UP (ref 11.5–15.5)
HGB BLDA-MCNC: 11.6 G/DL — LOW (ref 11.7–16.1)
IMM GRANULOCYTES # BLD AUTO: 0.03 K/UL — SIGNIFICANT CHANGE UP (ref 0–0.07)
IMM GRANULOCYTES # BLD AUTO: 0.18 K/UL — HIGH (ref 0–0.07)
IMM GRANULOCYTES NFR BLD AUTO: 0.3 % — SIGNIFICANT CHANGE UP (ref 0–0.9)
IMM GRANULOCYTES NFR BLD AUTO: 1.2 % — HIGH (ref 0–0.9)
INR BLD: 0.93 RATIO — SIGNIFICANT CHANGE UP (ref 0.85–1.16)
INR BLD: 1.1 RATIO — SIGNIFICANT CHANGE UP (ref 0.85–1.16)
LACTATE BLDA-MCNC: 1.4 MMOL/L — SIGNIFICANT CHANGE UP (ref 0.5–2)
LYMPHOCYTES # BLD AUTO: 1.1 K/UL — SIGNIFICANT CHANGE UP (ref 1–3.3)
LYMPHOCYTES # BLD AUTO: 1.5 K/UL — SIGNIFICANT CHANGE UP (ref 1–3.3)
LYMPHOCYTES NFR BLD AUTO: 15.2 % — SIGNIFICANT CHANGE UP (ref 13–44)
LYMPHOCYTES NFR BLD AUTO: 7.1 % — LOW (ref 13–44)
MCHC RBC-ENTMCNC: 27.8 PG — SIGNIFICANT CHANGE UP (ref 27–34)
MCHC RBC-ENTMCNC: 27.9 PG — SIGNIFICANT CHANGE UP (ref 27–34)
MCHC RBC-ENTMCNC: 31.3 G/DL — LOW (ref 32–36)
MCHC RBC-ENTMCNC: 31.4 G/DL — LOW (ref 32–36)
MCV RBC AUTO: 88.4 FL — SIGNIFICANT CHANGE UP (ref 80–100)
MCV RBC AUTO: 89.1 FL — SIGNIFICANT CHANGE UP (ref 80–100)
METHGB MFR BLDA: 0 % — SIGNIFICANT CHANGE UP
MONOCYTES # BLD AUTO: 0.48 K/UL — SIGNIFICANT CHANGE UP (ref 0–0.9)
MONOCYTES # BLD AUTO: 0.77 K/UL — SIGNIFICANT CHANGE UP (ref 0–0.9)
MONOCYTES NFR BLD AUTO: 3.1 % — SIGNIFICANT CHANGE UP (ref 2–14)
MONOCYTES NFR BLD AUTO: 7.8 % — SIGNIFICANT CHANGE UP (ref 2–14)
NEUTROPHILS # BLD AUTO: 13.66 K/UL — HIGH (ref 1.8–7.4)
NEUTROPHILS # BLD AUTO: 7.34 K/UL — SIGNIFICANT CHANGE UP (ref 1.8–7.4)
NEUTROPHILS NFR BLD AUTO: 74.4 % — SIGNIFICANT CHANGE UP (ref 43–77)
NEUTROPHILS NFR BLD AUTO: 87.9 % — HIGH (ref 43–77)
NRBC # BLD AUTO: 0 K/UL — SIGNIFICANT CHANGE UP (ref 0–0)
NRBC # BLD AUTO: 0 K/UL — SIGNIFICANT CHANGE UP (ref 0–0)
NRBC # FLD: 0 K/UL — SIGNIFICANT CHANGE UP (ref 0–0)
NRBC # FLD: 0 K/UL — SIGNIFICANT CHANGE UP (ref 0–0)
NRBC BLD AUTO-RTO: 0 /100 WBCS — SIGNIFICANT CHANGE UP (ref 0–0)
NRBC BLD AUTO-RTO: 0 /100 WBCS — SIGNIFICANT CHANGE UP (ref 0–0)
OXYHGB MFR BLDA: 97 % — HIGH (ref 90–95)
PCO2 BLDA: 37 MMHG — SIGNIFICANT CHANGE UP (ref 32–45)
PH BLDA: 7.43 — SIGNIFICANT CHANGE UP (ref 7.35–7.45)
PLATELET # BLD AUTO: 240 K/UL — SIGNIFICANT CHANGE UP (ref 150–400)
PLATELET # BLD AUTO: 292 K/UL — SIGNIFICANT CHANGE UP (ref 150–400)
PMV BLD: 9.2 FL — SIGNIFICANT CHANGE UP (ref 7–13)
PMV BLD: 9.7 FL — SIGNIFICANT CHANGE UP (ref 7–13)
PO2 BLDA: 353 MMHG — HIGH (ref 83–108)
POTASSIUM BLDA-SCNC: 3.9 MMOL/L — SIGNIFICANT CHANGE UP (ref 3.5–5.1)
POTASSIUM SERPL-MCNC: 3.9 MMOL/L — SIGNIFICANT CHANGE UP (ref 3.5–5.3)
POTASSIUM SERPL-MCNC: 4.8 MMOL/L — SIGNIFICANT CHANGE UP (ref 3.5–5.3)
POTASSIUM SERPL-SCNC: 3.9 MMOL/L — SIGNIFICANT CHANGE UP (ref 3.5–5.3)
POTASSIUM SERPL-SCNC: 4.8 MMOL/L — SIGNIFICANT CHANGE UP (ref 3.5–5.3)
PROT SERPL-MCNC: 5.7 G/DL — LOW (ref 6.6–8.7)
PROTHROM AB SERPL-ACNC: 10.8 SEC — SIGNIFICANT CHANGE UP (ref 9.9–13.4)
PROTHROM AB SERPL-ACNC: 12.8 SEC — SIGNIFICANT CHANGE UP (ref 9.9–13.4)
RBC # BLD: 3.71 M/UL — LOW (ref 3.8–5.2)
RBC # BLD: 4.12 M/UL — SIGNIFICANT CHANGE UP (ref 3.8–5.2)
RBC # FLD: 14.8 % — HIGH (ref 10.3–14.5)
RBC # FLD: 15.2 % — HIGH (ref 10.3–14.5)
SAO2 % BLDA: 97.1 % — SIGNIFICANT CHANGE UP (ref 94–98)
SODIUM BLDA-SCNC: 138 MMOL/L — SIGNIFICANT CHANGE UP (ref 136–145)
SODIUM SERPL-SCNC: 138 MMOL/L — SIGNIFICANT CHANGE UP (ref 135–145)
SODIUM SERPL-SCNC: 141 MMOL/L — SIGNIFICANT CHANGE UP (ref 135–145)
WBC # BLD: 15.52 K/UL — HIGH (ref 3.8–10.5)
WBC # BLD: 9.87 K/UL — SIGNIFICANT CHANGE UP (ref 3.8–10.5)
WBC # FLD AUTO: 15.52 K/UL — HIGH (ref 3.8–10.5)
WBC # FLD AUTO: 9.87 K/UL — SIGNIFICANT CHANGE UP (ref 3.8–10.5)

## 2025-04-11 PROCEDURE — 99291 CRITICAL CARE FIRST HOUR: CPT

## 2025-04-11 PROCEDURE — 86923 COMPATIBILITY TEST ELECTRIC: CPT

## 2025-04-11 PROCEDURE — 86900 BLOOD TYPING SEROLOGIC ABO: CPT

## 2025-04-11 PROCEDURE — 80053 COMPREHEN METABOLIC PANEL: CPT

## 2025-04-11 PROCEDURE — 87641 MR-STAPH DNA AMP PROBE: CPT

## 2025-04-11 PROCEDURE — 93005 ELECTROCARDIOGRAM TRACING: CPT

## 2025-04-11 PROCEDURE — 87086 URINE CULTURE/COLONY COUNT: CPT

## 2025-04-11 PROCEDURE — 84436 ASSAY OF TOTAL THYROXINE: CPT

## 2025-04-11 PROCEDURE — 84134 ASSAY OF PREALBUMIN: CPT

## 2025-04-11 PROCEDURE — 71045 X-RAY EXAM CHEST 1 VIEW: CPT | Mod: 26

## 2025-04-11 PROCEDURE — 84443 ASSAY THYROID STIM HORMONE: CPT

## 2025-04-11 PROCEDURE — 93306 TTE W/DOPPLER COMPLETE: CPT | Mod: 26

## 2025-04-11 PROCEDURE — 93010 ELECTROCARDIOGRAM REPORT: CPT | Mod: 77

## 2025-04-11 PROCEDURE — 84480 ASSAY TRIIODOTHYRONINE (T3): CPT

## 2025-04-11 PROCEDURE — 33363 REPLACE AORTIC VALVE OPEN: CPT | Mod: 62,Q0

## 2025-04-11 PROCEDURE — 86850 RBC ANTIBODY SCREEN: CPT

## 2025-04-11 PROCEDURE — 83036 HEMOGLOBIN GLYCOSYLATED A1C: CPT

## 2025-04-11 PROCEDURE — 87640 STAPH A DNA AMP PROBE: CPT

## 2025-04-11 PROCEDURE — 85610 PROTHROMBIN TIME: CPT

## 2025-04-11 PROCEDURE — G0463: CPT

## 2025-04-11 PROCEDURE — 99292 CRITICAL CARE ADDL 30 MIN: CPT

## 2025-04-11 PROCEDURE — 93010 ELECTROCARDIOGRAM REPORT: CPT

## 2025-04-11 PROCEDURE — 36415 COLL VENOUS BLD VENIPUNCTURE: CPT

## 2025-04-11 PROCEDURE — 83880 ASSAY OF NATRIURETIC PEPTIDE: CPT

## 2025-04-11 PROCEDURE — 85730 THROMBOPLASTIN TIME PARTIAL: CPT

## 2025-04-11 PROCEDURE — 85025 COMPLETE CBC W/AUTO DIFF WBC: CPT

## 2025-04-11 PROCEDURE — 86901 BLOOD TYPING SEROLOGIC RH(D): CPT

## 2025-04-11 PROCEDURE — 71046 X-RAY EXAM CHEST 2 VIEWS: CPT

## 2025-04-11 DEVICE — VLV AORTIC EVOLUT FX PLUS 26MM: Type: IMPLANTABLE DEVICE | Status: FUNCTIONAL

## 2025-04-11 DEVICE — FLOSEAL FAST PREP 10ML: Type: IMPLANTABLE DEVICE | Status: FUNCTIONAL

## 2025-04-11 DEVICE — IMPLANTABLE DEVICE: Type: IMPLANTABLE DEVICE | Status: FUNCTIONAL

## 2025-04-11 DEVICE — KIT A-LINE 1LUM 18G X 16CM: Type: IMPLANTABLE DEVICE | Status: FUNCTIONAL

## 2025-04-11 RX ORDER — ACETAMINOPHEN 500 MG/5ML
650 LIQUID (ML) ORAL EVERY 6 HOURS
Refills: 0 | Status: DISCONTINUED | OUTPATIENT
Start: 2025-04-11 | End: 2025-04-12

## 2025-04-11 RX ORDER — ASPIRIN 325 MG
81 TABLET ORAL DAILY
Refills: 0 | Status: DISCONTINUED | OUTPATIENT
Start: 2025-04-11 | End: 2025-04-11

## 2025-04-11 RX ORDER — ASPIRIN 325 MG
81 TABLET ORAL DAILY
Refills: 0 | Status: DISCONTINUED | OUTPATIENT
Start: 2025-04-11 | End: 2025-04-12

## 2025-04-11 RX ORDER — DENOSUMAB 60 MG/ML
60 INJECTION SUBCUTANEOUS
Refills: 0 | DISCHARGE

## 2025-04-11 RX ORDER — FERROUS SULFATE 137(45) MG
0 TABLET, EXTENDED RELEASE ORAL
Refills: 0 | DISCHARGE

## 2025-04-11 RX ORDER — OXYCODONE HYDROCHLORIDE 30 MG/1
10 TABLET ORAL ONCE
Refills: 0 | Status: DISCONTINUED | OUTPATIENT
Start: 2025-04-11 | End: 2025-04-11

## 2025-04-11 RX ORDER — ROSUVASTATIN CALCIUM 20 MG/1
20 TABLET, FILM COATED ORAL AT BEDTIME
Refills: 0 | Status: DISCONTINUED | OUTPATIENT
Start: 2025-04-11 | End: 2025-04-12

## 2025-04-11 RX ORDER — POLYETHYLENE GLYCOL 3350 17 G/17G
17 POWDER, FOR SOLUTION ORAL DAILY
Refills: 0 | Status: DISCONTINUED | OUTPATIENT
Start: 2025-04-11 | End: 2025-04-12

## 2025-04-11 RX ORDER — OXYCODONE HYDROCHLORIDE 30 MG/1
10 TABLET ORAL EVERY 4 HOURS
Refills: 0 | Status: DISCONTINUED | OUTPATIENT
Start: 2025-04-11 | End: 2025-04-12

## 2025-04-11 RX ADMIN — OXYCODONE HYDROCHLORIDE 10 MILLIGRAM(S): 30 TABLET ORAL at 23:34

## 2025-04-11 RX ADMIN — OXYCODONE HYDROCHLORIDE 10 MILLIGRAM(S): 30 TABLET ORAL at 17:39

## 2025-04-11 RX ADMIN — Medication 3 MILLILITER(S): at 11:25

## 2025-04-11 RX ADMIN — ROSUVASTATIN CALCIUM 20 MILLIGRAM(S): 20 TABLET, FILM COATED ORAL at 21:24

## 2025-04-11 RX ADMIN — OXYCODONE HYDROCHLORIDE 10 MILLIGRAM(S): 30 TABLET ORAL at 22:34

## 2025-04-11 RX ADMIN — Medication 81 MILLIGRAM(S): at 11:55

## 2025-04-11 RX ADMIN — Medication 10 MILLILITER(S): at 20:20

## 2025-04-11 RX ADMIN — OXYCODONE HYDROCHLORIDE 10 MILLIGRAM(S): 30 TABLET ORAL at 12:24

## 2025-04-11 RX ADMIN — POLYETHYLENE GLYCOL 3350 17 GRAM(S): 17 POWDER, FOR SOLUTION ORAL at 11:56

## 2025-04-11 RX ADMIN — OXYCODONE HYDROCHLORIDE 10 MILLIGRAM(S): 30 TABLET ORAL at 18:20

## 2025-04-11 RX ADMIN — OXYCODONE HYDROCHLORIDE 10 MILLIGRAM(S): 30 TABLET ORAL at 11:54

## 2025-04-11 RX ADMIN — Medication 40 MILLIGRAM(S): at 11:55

## 2025-04-12 VITALS
SYSTOLIC BLOOD PRESSURE: 140 MMHG | HEART RATE: 80 BPM | RESPIRATION RATE: 18 BRPM | OXYGEN SATURATION: 95 % | TEMPERATURE: 98 F | DIASTOLIC BLOOD PRESSURE: 60 MMHG

## 2025-04-12 LAB
BASOPHILS # BLD AUTO: 0.03 K/UL — SIGNIFICANT CHANGE UP (ref 0–0.2)
BASOPHILS NFR BLD AUTO: 0.2 % — SIGNIFICANT CHANGE UP (ref 0–2)
EOSINOPHIL # BLD AUTO: 0.01 K/UL — SIGNIFICANT CHANGE UP (ref 0–0.5)
EOSINOPHIL NFR BLD AUTO: 0.1 % — SIGNIFICANT CHANGE UP (ref 0–6)
GLUCOSE BLDC GLUCOMTR-MCNC: 93 MG/DL — SIGNIFICANT CHANGE UP (ref 70–99)
HCT VFR BLD CALC: 38.1 % — SIGNIFICANT CHANGE UP (ref 34.5–45)
HGB BLD-MCNC: 11.9 G/DL — SIGNIFICANT CHANGE UP (ref 11.5–15.5)
IMM GRANULOCYTES # BLD AUTO: 0.07 K/UL — SIGNIFICANT CHANGE UP (ref 0–0.07)
IMM GRANULOCYTES NFR BLD AUTO: 0.5 % — SIGNIFICANT CHANGE UP (ref 0–0.9)
LYMPHOCYTES # BLD AUTO: 1.13 K/UL — SIGNIFICANT CHANGE UP (ref 1–3.3)
LYMPHOCYTES NFR BLD AUTO: 7.5 % — LOW (ref 13–44)
MCHC RBC-ENTMCNC: 27.9 PG — SIGNIFICANT CHANGE UP (ref 27–34)
MCHC RBC-ENTMCNC: 31.2 G/DL — LOW (ref 32–36)
MCV RBC AUTO: 89.2 FL — SIGNIFICANT CHANGE UP (ref 80–100)
MONOCYTES # BLD AUTO: 0.96 K/UL — HIGH (ref 0–0.9)
MONOCYTES NFR BLD AUTO: 6.4 % — SIGNIFICANT CHANGE UP (ref 2–14)
NEUTROPHILS # BLD AUTO: 12.77 K/UL — HIGH (ref 1.8–7.4)
NEUTROPHILS NFR BLD AUTO: 85.3 % — HIGH (ref 43–77)
NRBC # BLD AUTO: 0 K/UL — SIGNIFICANT CHANGE UP (ref 0–0)
NRBC # FLD: 0 K/UL — SIGNIFICANT CHANGE UP (ref 0–0)
NRBC BLD AUTO-RTO: 0 /100 WBCS — SIGNIFICANT CHANGE UP (ref 0–0)
PLATELET # BLD AUTO: 300 K/UL — SIGNIFICANT CHANGE UP (ref 150–400)
PMV BLD: 9.6 FL — SIGNIFICANT CHANGE UP (ref 7–13)
RBC # BLD: 4.27 M/UL — SIGNIFICANT CHANGE UP (ref 3.8–5.2)
RBC # FLD: 15 % — HIGH (ref 10.3–14.5)
WBC # BLD: 14.97 K/UL — HIGH (ref 3.8–10.5)
WBC # FLD AUTO: 14.97 K/UL — HIGH (ref 3.8–10.5)

## 2025-04-12 PROCEDURE — 82947 ASSAY GLUCOSE BLOOD QUANT: CPT

## 2025-04-12 PROCEDURE — 85014 HEMATOCRIT: CPT

## 2025-04-12 PROCEDURE — 93320 DOPPLER ECHO COMPLETE: CPT

## 2025-04-12 PROCEDURE — 85730 THROMBOPLASTIN TIME PARTIAL: CPT

## 2025-04-12 PROCEDURE — 85018 HEMOGLOBIN: CPT

## 2025-04-12 PROCEDURE — 36415 COLL VENOUS BLD VENIPUNCTURE: CPT

## 2025-04-12 PROCEDURE — 84295 ASSAY OF SERUM SODIUM: CPT

## 2025-04-12 PROCEDURE — 76000 FLUOROSCOPY <1 HR PHYS/QHP: CPT

## 2025-04-12 PROCEDURE — C1894: CPT

## 2025-04-12 PROCEDURE — 82330 ASSAY OF CALCIUM: CPT

## 2025-04-12 PROCEDURE — 84132 ASSAY OF SERUM POTASSIUM: CPT

## 2025-04-12 PROCEDURE — 86900 BLOOD TYPING SEROLOGIC ABO: CPT

## 2025-04-12 PROCEDURE — C1769: CPT

## 2025-04-12 PROCEDURE — 93010 ELECTROCARDIOGRAM REPORT: CPT

## 2025-04-12 PROCEDURE — 93325 DOPPLER ECHO COLOR FLOW MAPG: CPT

## 2025-04-12 PROCEDURE — 80053 COMPREHEN METABOLIC PANEL: CPT

## 2025-04-12 PROCEDURE — 71045 X-RAY EXAM CHEST 1 VIEW: CPT | Mod: 26

## 2025-04-12 PROCEDURE — 85610 PROTHROMBIN TIME: CPT

## 2025-04-12 PROCEDURE — 85025 COMPLETE CBC W/AUTO DIFF WBC: CPT

## 2025-04-12 PROCEDURE — 86923 COMPATIBILITY TEST ELECTRIC: CPT

## 2025-04-12 PROCEDURE — C1887: CPT

## 2025-04-12 PROCEDURE — 86850 RBC ANTIBODY SCREEN: CPT

## 2025-04-12 PROCEDURE — 80048 BASIC METABOLIC PNL TOTAL CA: CPT

## 2025-04-12 PROCEDURE — 93306 TTE W/DOPPLER COMPLETE: CPT

## 2025-04-12 PROCEDURE — 82435 ASSAY OF BLOOD CHLORIDE: CPT

## 2025-04-12 PROCEDURE — 82962 GLUCOSE BLOOD TEST: CPT

## 2025-04-12 PROCEDURE — 82803 BLOOD GASES ANY COMBINATION: CPT

## 2025-04-12 PROCEDURE — C9399: CPT

## 2025-04-12 PROCEDURE — 93005 ELECTROCARDIOGRAM TRACING: CPT

## 2025-04-12 PROCEDURE — 71045 X-RAY EXAM CHEST 1 VIEW: CPT

## 2025-04-12 PROCEDURE — 83605 ASSAY OF LACTIC ACID: CPT

## 2025-04-12 PROCEDURE — 86901 BLOOD TYPING SEROLOGIC RH(D): CPT

## 2025-04-12 PROCEDURE — C1889: CPT

## 2025-04-12 RX ORDER — INSULIN LISPRO 100 U/ML
INJECTION, SOLUTION INTRAVENOUS; SUBCUTANEOUS
Refills: 0 | Status: DISCONTINUED | OUTPATIENT
Start: 2025-04-12 | End: 2025-04-12

## 2025-04-12 RX ORDER — LOSARTAN POTASSIUM 100 MG/1
100 TABLET, FILM COATED ORAL DAILY
Refills: 0 | Status: DISCONTINUED | OUTPATIENT
Start: 2025-04-12 | End: 2025-04-12

## 2025-04-12 RX ORDER — HYDROXYCHLOROQUINE SULFATE 200 MG/1
200 TABLET, FILM COATED ORAL DAILY
Refills: 0 | Status: DISCONTINUED | OUTPATIENT
Start: 2025-04-12 | End: 2025-04-12

## 2025-04-12 RX ORDER — OXYCODONE HYDROCHLORIDE AND ACETAMINOPHEN 10; 325 MG/1; MG/1
1 TABLET ORAL
Refills: 0 | DISCHARGE

## 2025-04-12 RX ORDER — METOPROLOL SUCCINATE 50 MG/1
1 TABLET, EXTENDED RELEASE ORAL
Qty: 60 | Refills: 1
Start: 2025-04-12 | End: 2025-06-10

## 2025-04-12 RX ORDER — GLUCAGON 3 MG/1
1 POWDER NASAL ONCE
Refills: 0 | Status: DISCONTINUED | OUTPATIENT
Start: 2025-04-12 | End: 2025-04-12

## 2025-04-12 RX ORDER — SODIUM CHLORIDE 9 G/1000ML
1000 INJECTION, SOLUTION INTRAVENOUS
Refills: 0 | Status: DISCONTINUED | OUTPATIENT
Start: 2025-04-12 | End: 2025-04-12

## 2025-04-12 RX ORDER — DEXTROSE 50 % IN WATER 50 %
12.5 SYRINGE (ML) INTRAVENOUS ONCE
Refills: 0 | Status: DISCONTINUED | OUTPATIENT
Start: 2025-04-12 | End: 2025-04-12

## 2025-04-12 RX ORDER — DEXTROSE 50 % IN WATER 50 %
25 SYRINGE (ML) INTRAVENOUS ONCE
Refills: 0 | Status: DISCONTINUED | OUTPATIENT
Start: 2025-04-12 | End: 2025-04-12

## 2025-04-12 RX ORDER — ASPIRIN 325 MG
1 TABLET ORAL
Qty: 0 | Refills: 0 | DISCHARGE

## 2025-04-12 RX ORDER — ACETAMINOPHEN 500 MG/5ML
2 LIQUID (ML) ORAL
Qty: 0 | Refills: 0 | DISCHARGE
Start: 2025-04-12

## 2025-04-12 RX ORDER — DEXTROSE 50 % IN WATER 50 %
15 SYRINGE (ML) INTRAVENOUS ONCE
Refills: 0 | Status: DISCONTINUED | OUTPATIENT
Start: 2025-04-12 | End: 2025-04-12

## 2025-04-12 RX ORDER — POLYETHYLENE GLYCOL 3350 17 G/17G
17 POWDER, FOR SOLUTION ORAL
Qty: 0 | Refills: 0 | DISCHARGE
Start: 2025-04-12

## 2025-04-12 RX ADMIN — OXYCODONE HYDROCHLORIDE 10 MILLIGRAM(S): 30 TABLET ORAL at 03:39

## 2025-04-12 RX ADMIN — Medication 650 MILLIGRAM(S): at 10:12

## 2025-04-12 RX ADMIN — HYDROXYCHLOROQUINE SULFATE 200 MILLIGRAM(S): 200 TABLET, FILM COATED ORAL at 10:13

## 2025-04-12 RX ADMIN — POLYETHYLENE GLYCOL 3350 17 GRAM(S): 17 POWDER, FOR SOLUTION ORAL at 10:13

## 2025-04-12 RX ADMIN — Medication 40 MILLIGRAM(S): at 05:35

## 2025-04-12 RX ADMIN — LOSARTAN POTASSIUM 100 MILLIGRAM(S): 100 TABLET, FILM COATED ORAL at 05:35

## 2025-04-12 RX ADMIN — Medication 81 MILLIGRAM(S): at 10:12

## 2025-04-16 ENCOUNTER — NON-APPOINTMENT (OUTPATIENT)
Age: 75
End: 2025-04-16

## 2025-04-16 RX ORDER — HYDROCHLOROTHIAZIDE 25 MG/1
25 TABLET ORAL
Refills: 0 | Status: ACTIVE | COMMUNITY

## 2025-04-16 RX ORDER — METOPROLOL TARTRATE 25 MG/1
25 TABLET ORAL TWICE DAILY
Qty: 60 | Refills: 1 | Status: ACTIVE | COMMUNITY

## 2025-04-19 ENCOUNTER — TRANSCRIPTION ENCOUNTER (OUTPATIENT)
Age: 75
End: 2025-04-19

## 2025-04-19 ENCOUNTER — NON-APPOINTMENT (OUTPATIENT)
Age: 75
End: 2025-04-19

## 2025-04-22 PROBLEM — I35.0 AORTIC STENOSIS, SEVERE: Status: ACTIVE | Noted: 2025-04-22

## 2025-04-24 PROBLEM — Z95.2 S/P TAVR (TRANSCATHETER AORTIC VALVE REPLACEMENT): Status: ACTIVE | Noted: 2025-04-24

## 2025-04-25 ENCOUNTER — APPOINTMENT (OUTPATIENT)
Dept: CARDIOTHORACIC SURGERY | Facility: CLINIC | Age: 75
End: 2025-04-25
Payer: MEDICARE

## 2025-04-25 ENCOUNTER — OUTPATIENT (OUTPATIENT)
Dept: OUTPATIENT SERVICES | Facility: HOSPITAL | Age: 75
LOS: 1 days | End: 2025-04-25
Payer: MEDICARE

## 2025-04-25 VITALS
HEART RATE: 18 BPM | HEIGHT: 66 IN | BODY MASS INDEX: 27.48 KG/M2 | SYSTOLIC BLOOD PRESSURE: 121 MMHG | WEIGHT: 171 LBS | RESPIRATION RATE: 18 BRPM | OXYGEN SATURATION: 96 % | DIASTOLIC BLOOD PRESSURE: 72 MMHG

## 2025-04-25 DIAGNOSIS — I35.0 NONRHEUMATIC AORTIC (VALVE) STENOSIS: ICD-10-CM

## 2025-04-25 DIAGNOSIS — Z98.890 OTHER SPECIFIED POSTPROCEDURAL STATES: Chronic | ICD-10-CM

## 2025-04-25 DIAGNOSIS — Z95.2 PRESENCE OF PROSTHETIC HEART VALVE: ICD-10-CM

## 2025-04-25 PROCEDURE — 99213 OFFICE O/P EST LOW 20 MIN: CPT

## 2025-04-25 PROCEDURE — 71046 X-RAY EXAM CHEST 2 VIEWS: CPT

## 2025-04-25 PROCEDURE — 71046 X-RAY EXAM CHEST 2 VIEWS: CPT | Mod: 26

## 2025-04-28 ENCOUNTER — NON-APPOINTMENT (OUTPATIENT)
Age: 75
End: 2025-04-28

## 2025-05-06 ENCOUNTER — APPOINTMENT (OUTPATIENT)
Dept: VASCULAR SURGERY | Facility: CLINIC | Age: 75
End: 2025-05-06
Payer: MEDICARE

## 2025-05-06 VITALS
RESPIRATION RATE: 16 BRPM | TEMPERATURE: 97.8 F | SYSTOLIC BLOOD PRESSURE: 133 MMHG | HEART RATE: 88 BPM | DIASTOLIC BLOOD PRESSURE: 52 MMHG | WEIGHT: 174 LBS | HEIGHT: 66 IN | BODY MASS INDEX: 27.97 KG/M2

## 2025-05-06 DIAGNOSIS — I73.9 PERIPHERAL VASCULAR DISEASE, UNSPECIFIED: ICD-10-CM

## 2025-05-06 PROBLEM — R42 DIZZINESS: Status: ACTIVE | Noted: 2025-05-06

## 2025-05-06 PROCEDURE — 93882 EXTRACRANIAL UNI/LTD STUDY: CPT

## 2025-05-06 PROCEDURE — 99204 OFFICE O/P NEW MOD 45 MIN: CPT

## 2025-05-13 ENCOUNTER — APPOINTMENT (OUTPATIENT)
Dept: FAMILY MEDICINE | Facility: CLINIC | Age: 75
End: 2025-05-13
Payer: MEDICARE

## 2025-05-13 VITALS
TEMPERATURE: 97.3 F | DIASTOLIC BLOOD PRESSURE: 81 MMHG | HEART RATE: 83 BPM | HEIGHT: 66 IN | BODY MASS INDEX: 27.97 KG/M2 | WEIGHT: 174 LBS | SYSTOLIC BLOOD PRESSURE: 155 MMHG | OXYGEN SATURATION: 97 %

## 2025-05-13 DIAGNOSIS — I48.91 UNSPECIFIED ATRIAL FIBRILLATION: ICD-10-CM

## 2025-05-13 DIAGNOSIS — G89.29 DORSALGIA, UNSPECIFIED: ICD-10-CM

## 2025-05-13 DIAGNOSIS — M54.9 DORSALGIA, UNSPECIFIED: ICD-10-CM

## 2025-05-13 DIAGNOSIS — R42 DIZZINESS AND GIDDINESS: ICD-10-CM

## 2025-05-13 DIAGNOSIS — M34.9 SYSTEMIC SCLEROSIS, UNSPECIFIED: ICD-10-CM

## 2025-05-13 PROCEDURE — G2211 COMPLEX E/M VISIT ADD ON: CPT

## 2025-05-13 PROCEDURE — 99214 OFFICE O/P EST MOD 30 MIN: CPT

## 2025-05-13 RX ORDER — CEPHALEXIN 500 MG/1
500 CAPSULE ORAL
Qty: 14 | Refills: 0 | Status: ACTIVE | COMMUNITY
Start: 2025-05-13 | End: 1900-01-01

## 2025-05-21 ENCOUNTER — LABORATORY RESULT (OUTPATIENT)
Age: 75
End: 2025-05-21

## 2025-05-22 LAB
25(OH)D3 SERPL-MCNC: 22.9 NG/ML
ALBUMIN SERPL ELPH-MCNC: 4 G/DL
ALP BLD-CCNC: 50 U/L
ALT SERPL-CCNC: 19 U/L
ANION GAP SERPL CALC-SCNC: 18 MMOL/L
APPEARANCE: CLEAR
AST SERPL-CCNC: 19 U/L
B BURGDOR AB SER-IMP: NEGATIVE
B BURGDOR IGG+IGM SER QL: 0.08 INDEX
BASOPHILS # BLD AUTO: 0.07 K/UL
BASOPHILS NFR BLD AUTO: 0.6 %
BILIRUB SERPL-MCNC: 0.4 MG/DL
BILIRUBIN URINE: NEGATIVE
BLOOD URINE: NEGATIVE
BUN SERPL-MCNC: 17 MG/DL
CALCIUM SERPL-MCNC: 9.6 MG/DL
CHLORIDE SERPL-SCNC: 103 MMOL/L
CHOLEST SERPL-MCNC: 109 MG/DL
CO2 SERPL-SCNC: 19 MMOL/L
COLOR: YELLOW
CREAT SERPL-MCNC: 0.74 MG/DL
CREAT SPEC-SCNC: 180 MG/DL
DEPRECATED GLUTEN IGE RAST QL: <0.1 KUA/L
EGFRCR SERPLBLD CKD-EPI 2021: 84 ML/MIN/1.73M2
EOSINOPHIL # BLD AUTO: 0.15 K/UL
EOSINOPHIL NFR BLD AUTO: 1.3 %
ERYTHROCYTE [SEDIMENTATION RATE] IN BLOOD BY WESTERGREN METHOD: 54 MM/HR
ESTIMATED AVERAGE GLUCOSE: 126 MG/DL
FERRITIN SERPL-MCNC: 21 NG/ML
FOLATE SERPL-MCNC: 19.5 NG/ML
GLUCOSE QUALITATIVE U: NEGATIVE
GLUCOSE SERPL-MCNC: 137 MG/DL
GLUTEN IGG QN: 0
HBA1C MFR BLD HPLC: 6 %
HCT VFR BLD CALC: 38.7 %
HDLC SERPL-MCNC: 47 MG/DL
HGB BLD-MCNC: 10.9 G/DL
IMM GRANULOCYTES NFR BLD AUTO: 0.4 %
KETONES URINE: NEGATIVE
LDLC SERPL-MCNC: 39 MG/DL
LEUKOCYTE ESTERASE URINE: NEGATIVE
LYMPHOCYTES # BLD AUTO: 1.46 K/UL
LYMPHOCYTES NFR BLD AUTO: 13.1 %
MAN DIFF?: NORMAL
MCHC RBC-ENTMCNC: 25.8 PG
MCHC RBC-ENTMCNC: 28.2 G/DL
MCV RBC AUTO: 91.7 FL
MICROALBUMIN 24H UR DL<=1MG/L-MCNC: 24.6 MG/DL
MICROALBUMIN/CREAT 24H UR-RTO: 137 MG/G
MONOCYTES # BLD AUTO: 0.82 K/UL
MONOCYTES NFR BLD AUTO: 7.3 %
NEUTROPHILS # BLD AUTO: 8.62 K/UL
NEUTROPHILS NFR BLD AUTO: 77.3 %
NITRITE URINE: NEGATIVE
NONHDLC SERPL-MCNC: 62 MG/DL
PH URINE: 6
PLATELET # BLD AUTO: 346 K/UL
POTASSIUM SERPL-SCNC: 4.2 MMOL/L
PROT SERPL-MCNC: 6.8 G/DL
PROTEIN URINE: ABNORMAL
RBC # BLD: 4.22 M/UL
RBC # FLD: 15.2 %
RHEUMATOID FACT SER QL: <10 IU/ML
SODIUM SERPL-SCNC: 139 MMOL/L
SPECIFIC GRAVITY URINE: >=1.03
T3RU NFR SERPL: 1 TBI
T4 FREE SERPL-MCNC: 1.4 NG/DL
TRIGL SERPL-MCNC: 133 MG/DL
TSH SERPL-ACNC: 1.4 UIU/ML
TTG IGA SER IA-ACNC: <0.5 U/ML
TTG IGA SER-ACNC: NEGATIVE
TTG IGG SER IA-ACNC: <0.8 U/ML
TTG IGG SER IA-ACNC: NEGATIVE
UROBILINOGEN URINE: NORMAL
VIT B12 SERPL-MCNC: 367 PG/ML
WBC # FLD AUTO: 11.16 K/UL

## 2025-05-24 LAB
ALMOND IGE QN: <0.1 KUA/L
B MICROTI IGG TITR SER: NORMAL
BABESIA ANTIBODIES, IGM: NORMAL
BRAZIL NUT IGE QN: <0.1 KUA/L
CASHEW NUT IGE QN: <0.1 KUA/L
CODFISH IGE QN: <0.1 KUA/L
COW MILK IGE QN: <0.1 KUA/L
DEPRECATED ALMOND IGE RAST QL: 0
DEPRECATED BRAZIL NUT IGE RAST QL: 0
DEPRECATED CASHEW NUT IGE RAST QL: 0
DEPRECATED CODFISH IGE RAST QL: 0
DEPRECATED COW MILK IGE RAST QL: 0
DEPRECATED EGG WHITE IGE RAST QL: 0
DEPRECATED HAZELNUT IGE RAST QL: 0
DEPRECATED PEANUT IGE RAST QL: 0
DEPRECATED SALMON IGE RAST QL: 0
DEPRECATED SCALLOP IGE RAST QL: <0.1 KUA/L
DEPRECATED SESAME SEED IGE RAST QL: 0
DEPRECATED SHRIMP IGE RAST QL: 0
DEPRECATED SOYBEAN IGE RAST QL: 0
DEPRECATED TUNA IGE RAST QL: 0
DEPRECATED WALNUT IGE RAST QL: 0
DEPRECATED WHEAT IGE RAST QL: 0
EGG WHITE IGE QN: <0.1 KUA/L
HAZELNUT IGE QN: <0.1 KUA/L
PEANUT IGE QN: <0.1 KUA/L
SALMON IGE QN: <0.1 KUA/L
SCALLOP IGE QN: 0
SCALLOP IGE QN: <0.1 KUA/L
SESAME SEED IGE QN: <0.1 KUA/L
SOYBEAN IGE QN: <0.1 KUA/L
TOTAL IGE SMQN RAST: 34 KU/L
TUNA IGE QN: <0.1 KUA/L
WALNUT IGE QN: <0.1 KUA/L
WHEAT IGE QN: <0.1 KUA/L

## 2025-08-19 ENCOUNTER — APPOINTMENT (OUTPATIENT)
Dept: RHEUMATOLOGY | Facility: CLINIC | Age: 75
End: 2025-08-19

## 2025-08-20 RX ORDER — DENOSUMAB 60 MG/ML
60 INJECTION SUBCUTANEOUS
Qty: 1 | Refills: 0 | Status: COMPLETED | OUTPATIENT
Start: 2025-08-20 | End: 1900-01-01

## 2025-08-25 ENCOUNTER — APPOINTMENT (OUTPATIENT)
Dept: RHEUMATOLOGY | Facility: CLINIC | Age: 75
End: 2025-08-25
Payer: MEDICARE

## 2025-08-25 ENCOUNTER — MED ADMIN CHARGE (OUTPATIENT)
Age: 75
End: 2025-08-25

## 2025-08-25 VITALS
DIASTOLIC BLOOD PRESSURE: 72 MMHG | SYSTOLIC BLOOD PRESSURE: 143 MMHG | RESPIRATION RATE: 16 BRPM | HEART RATE: 92 BPM | OXYGEN SATURATION: 92 % | TEMPERATURE: 97.6 F

## 2025-08-25 PROCEDURE — 96372 THER/PROPH/DIAG INJ SC/IM: CPT

## 2025-08-26 RX ORDER — DENOSUMAB 60 MG/ML
60 INJECTION SUBCUTANEOUS
Qty: 1 | Refills: 0 | Status: COMPLETED
Start: 2025-08-20

## (undated) DEVICE — SUT MONOCRYL 4-0 27" PS-2 UNDYED

## (undated) DEVICE — PREP SCRUB BRUSH W CHG 4%

## (undated) DEVICE — LOADING SYSTEM EVOLUT FX 23-29MM

## (undated) DEVICE — Device

## (undated) DEVICE — WARMING BLANKET FULL UNDERBODY

## (undated) DEVICE — DRAPE 3/4 SHEET 52X76"

## (undated) DEVICE — ELCTR MULTIFUNCTION DEFIBRILLATION ELECTRODE EDGE SYSTEM ADULT

## (undated) DEVICE — DRAPE CV 106" X 135"

## (undated) DEVICE — VISITEC 4X4

## (undated) DEVICE — DRSG MEPILEX 10 X 30CM (4 X 12") WHITE

## (undated) DEVICE — SYR LUER LOK 20CC

## (undated) DEVICE — PREP CHLORAPREP HI-LITE ORANGE 26ML

## (undated) DEVICE — POSITIONER FOAM EGG CRATE ULNAR 2PCS (PINK)

## (undated) DEVICE — DRSG MASTISOL

## (undated) DEVICE — GOWN TRIMAX LG

## (undated) DEVICE — SOL IRR POUR NS 0.9% 1000ML

## (undated) DEVICE — DRSG TEGADERM 4 X 4.75"

## (undated) DEVICE — PACK MINOR WITH LAP

## (undated) DEVICE — DRAPE TOWEL BLUE 17" X 24"

## (undated) DEVICE — DRAPE C ARM UNIVERSAL

## (undated) DEVICE — PACK BASIC GOWN

## (undated) DEVICE — SUT SILK 0 30" SH

## (undated) DEVICE — WOUND IRR IRRISEPT W 0.5 CHG

## (undated) DEVICE — DRAPE DOME BAG 22"

## (undated) DEVICE — SOL INJ NS 0.9% 1000ML